# Patient Record
Sex: MALE | Race: BLACK OR AFRICAN AMERICAN | NOT HISPANIC OR LATINO | Employment: OTHER | URBAN - METROPOLITAN AREA
[De-identification: names, ages, dates, MRNs, and addresses within clinical notes are randomized per-mention and may not be internally consistent; named-entity substitution may affect disease eponyms.]

---

## 2017-01-12 ENCOUNTER — OFFICE VISIT (OUTPATIENT)
Dept: WOUND CARE | Facility: CLINIC | Age: 62
End: 2017-01-12
Payer: MEDICARE

## 2017-01-12 VITALS
BODY MASS INDEX: 25.35 KG/M2 | HEIGHT: 72 IN | HEART RATE: 80 BPM | SYSTOLIC BLOOD PRESSURE: 131 MMHG | TEMPERATURE: 98 F | DIASTOLIC BLOOD PRESSURE: 72 MMHG | WEIGHT: 187.19 LBS

## 2017-01-12 DIAGNOSIS — L89.320 DECUBITUS ULCER OF LEFT ISCHIUM, UNSTAGEABLE: ICD-10-CM

## 2017-01-12 DIAGNOSIS — G82.20 PARAPLEGIA: ICD-10-CM

## 2017-01-12 DIAGNOSIS — L89.312: Primary | ICD-10-CM

## 2017-01-12 DIAGNOSIS — L89.512 DECUBITUS ULCER OF RIGHT ANKLE, STAGE 2: ICD-10-CM

## 2017-01-12 PROCEDURE — 99212 OFFICE O/P EST SF 10 MIN: CPT | Mod: S$PBB,,, | Performed by: NURSE PRACTITIONER

## 2017-01-12 PROCEDURE — 99999 PR PBB SHADOW E&M-EST. PATIENT-LVL V: CPT | Mod: PBBFAC,,, | Performed by: NURSE PRACTITIONER

## 2017-01-12 PROCEDURE — 99215 OFFICE O/P EST HI 40 MIN: CPT | Mod: PBBFAC | Performed by: NURSE PRACTITIONER

## 2017-01-12 NOTE — MR AVS SNAPSHOT
[facility]  [address]  [facility] [address]  Phone: [phone]                  Maciel Contreras   2017 2:00 PM   Office Visit    Description:  Male : 1955   Provider:  Mirna Guzman NP   Department:  [facility]           Reason for Visit     Wound Check           Diagnoses this Visit        Comments    Decubitus ulcer of right ischium, stage 2    -  Primary     Decubitus ulcer of left ischium, unstageable         Decubitus ulcer of right ankle, stage 2         Paraplegia                To Do List           Future Appointments        Provider Department Dept Phone    2017 2:00 PM KAVEH Velasquez [facility] [phone]      Goals (5 Years of Data)     None      Follow-Up and Disposition     Return in about 1 month (around 2017) for Wound evaluation.      [facility] On Call     [facility] On Call Nurse [facility] Line -  Assistance  Registered nurses in the [facility] On Call Center provide clinical advisement, health education, appointment booking, and other advisory services.  Call for this free service at [phone].             Medications           Message regarding Medications     Verify the changes and/or additions to your medication regime listed below are the same as discussed with your clinician today.  If any of these changes or additions are incorrect, please notify your healthcare provider.             Verify that the below list of medications is an accurate representation of the medications you are currently taking.  If none reported, the list may be blank. If incorrect, please contact your healthcare provider. Carry this list with you in case of emergency.           Current Medications     atenolol-chlorthalidone (TENORETIC) 100-25 mg per tablet Take 1 tablet by mouth once daily.    carisoprodol (SOMA) 350 MG tablet Take 1 tablet (350 mg total) by mouth 2 (two) times daily as needed.    ciprofloxacin HCl (CIPRO) 500 MG tablet Take 1  tablet (500 mg total) by mouth 2 (two) times daily.    clobetasol (TEMOVATE) 0.05 % cream Apply topically 2 (two) times daily.    cyclobenzaprine (FLEXERIL) 10 MG tablet Take 1 tablet by mouth 3 (three) times daily.    docusate sodium (COLACE) 100 MG capsule Take 1 capsule (100 mg total) by mouth 2 (two) times daily as needed for Constipation.    furosemide (LASIX) 40 MG tablet Take 1 tablet (40 mg total) by mouth daily as needed. For leg swelling.    lactulose (CHRONULAC) 20 gram/30 mL Soln 1-2 tablespoons daily for constipation    lorazepam (ATIVAN) 2 MG Tab TAKE ONE TABLET BY MOUTH TWICE DAILY    mometasone (NASONEX) 50 mcg/actuation nasal spray 1 spray by Nasal route once daily.    oxycodone-acetaminophen (PERCOCET)  mg per tablet Take 1 tablet by mouth 3 (three) times daily as needed.    potassium chloride SA (K-DUR,KLOR-CON) 20 MEQ tablet Take 1 tablet (20 mEq total) by mouth once daily.    promethazine-codeine 6.25-10 mg/5 ml (PHENERGAN WITH CODEINE) 6.25-10 mg/5 mL syrup Take 5-10 mLs by mouth every 4 (four) hours as needed for Cough.    tramadol (ULTRAM) 50 mg tablet TAKE 1 TO 2 TABLETS BY MOUTH THREE TIMES DAILY AS NEEDED FOR PAIN    vardenafil (LEVITRA) 20 MG tablet Take 1 tablet (20 mg total) by mouth daily as needed for Erectile Dysfunction.    zolpidem (AMBIEN) 10 mg Tab Take 1 tablet (10 mg total) by mouth nightly as needed.           Clinical Reference Information           Vital Signs - Last Recorded  Most recent update: 1/12/2017  2:25 PM by Anayeli Adams LPN    BP Pulse Temp Ht Wt BMI    131/72 80 98 °F (36.7 °C) (Oral) 6' (1.829 m) 84.9 kg (187 lb 3.2 oz) 25.39 kg/m2      Blood Pressure          Most Recent Value    BP  131/72      Allergies as of 1/12/2017     Cephalexin      Immunizations Administered on Date of Encounter - 1/12/2017     None      Instructions    Keep your dressing dry.  On the day home health is coming to change your dressing, you may shower with a mild soap such as  Dove.  Irrigate the wound with lukewarm water for 5 minutes, then dry thoroughly.  Place a dry bandage over the wound to cover it until the nurse arrives.       Smoking Cessation     If you would like to quit smoking:   You may be eligible for free services if you are a [place] resident and started smoking cigarettes before September 1, 1988.  Call the Smoking Cessation Trust ([facility]) toll free at [phone] [phone] or [phone].   Call 7-800-QUIT-NOW if you do not meet the above criteria.

## 2017-01-12 NOTE — PATIENT INSTRUCTIONS
Keep your dressing dry.  On the day home health is coming to change your dressing, you may shower with a mild soap such as Dove.  Irrigate the wound with lukewarm water for 5 minutes, then dry thoroughly.  Place a dry bandage over the wound to cover it until the nurse arrives.

## 2017-01-12 NOTE — PROGRESS NOTES
Subjective:       Patient ID: Maciel Contreras is a 61 y.o. male.    Chief Complaint: Wound Check    Wound Check       This patient is seen today for reevaluation of recurrent ulcers to the right lateral ankle and both hips.  His medical history is significant for paraplegia.  These wounds all recurred within the last month.  He was not seen last month because of transportation issues.  He is using mepilex foam border dressings on the wounds.  He is afebrile.  He denies increased redness, swelling or purulent drainage.  He has no pain.    Review of Systems  Unchanged from prior visit.  Objective:      Physical Exam   Constitutional: He is oriented to person, place, and time. He appears well-developed and well-nourished. No distress.   HENT:   Head: Normocephalic and atraumatic.   Musculoskeletal: He exhibits no edema or tenderness.        Legs:       Feet:    Patient is paraplegic and unable to voluntarily move lower extremities.   Neurological: He is alert and oriented to person, place, and time.   Skin: Skin is warm and dry. No rash noted. He is not diaphoretic. No cyanosis or erythema. Nails show no clubbing.   Psychiatric: He has a normal mood and affect. His behavior is normal. Judgment and thought content normal.   Nursing note and vitals reviewed.        Assessment:       1. Decubitus ulcer of right ischium, stage 2    2. Decubitus ulcer of left ischium, unstageable    3. Decubitus ulcer of right ankle, stage 2    4. Paraplegia        Plan:           Cleanse wounds with wound .  Skin prep to periwound area.  Santyl to left ischial decubitus.  Aquacel or allevyn foam border dressing to bilateral ischial (4x4) and right lateral ankle (3x3) wounds three times weekly.  Secure ankle dressing with roll gauze.  [facility] to assist with dressing changes three times weekly.  Return to clinic in 2 weeks.      Right hip      Left ischium      Right lateral  ankle

## 2017-02-07 ENCOUNTER — TELEPHONE (OUTPATIENT)
Dept: WOUND CARE | Facility: CLINIC | Age: 62
End: 2017-02-07

## 2017-02-08 ENCOUNTER — TELEPHONE (OUTPATIENT)
Dept: WOUND CARE | Facility: CLINIC | Age: 62
End: 2017-02-08

## 2017-02-09 ENCOUNTER — APPOINTMENT (OUTPATIENT)
Dept: LAB | Facility: HOSPITAL | Age: 62
End: 2017-02-09
Attending: INTERNAL MEDICINE
Payer: MEDICARE

## 2017-02-09 PROCEDURE — 87088 URINE BACTERIA CULTURE: CPT

## 2017-02-09 PROCEDURE — 87186 SC STD MICRODIL/AGAR DIL: CPT

## 2017-02-09 PROCEDURE — 87086 URINE CULTURE/COLONY COUNT: CPT

## 2017-02-09 PROCEDURE — 87077 CULTURE AEROBIC IDENTIFY: CPT

## 2017-02-14 ENCOUNTER — OFFICE VISIT (OUTPATIENT)
Dept: WOUND CARE | Facility: CLINIC | Age: 62
End: 2017-02-14
Payer: MEDICARE

## 2017-02-14 VITALS
DIASTOLIC BLOOD PRESSURE: 82 MMHG | HEIGHT: 72 IN | BODY MASS INDEX: 26.02 KG/M2 | TEMPERATURE: 99 F | SYSTOLIC BLOOD PRESSURE: 153 MMHG | WEIGHT: 192.13 LBS | HEART RATE: 77 BPM

## 2017-02-14 DIAGNOSIS — L89.312: ICD-10-CM

## 2017-02-14 DIAGNOSIS — L89.512 DECUBITUS ULCER OF RIGHT ANKLE, STAGE 2: ICD-10-CM

## 2017-02-14 DIAGNOSIS — L89.320 DECUBITUS ULCER OF LEFT ISCHIUM, UNSTAGEABLE: Primary | ICD-10-CM

## 2017-02-14 DIAGNOSIS — G82.20 PARAPLEGIA: ICD-10-CM

## 2017-02-14 PROCEDURE — 99999 PR PBB SHADOW E&M-EST. PATIENT-LVL V: CPT | Mod: PBBFAC,,, | Performed by: NURSE PRACTITIONER

## 2017-02-14 PROCEDURE — 99215 OFFICE O/P EST HI 40 MIN: CPT | Mod: PBBFAC | Performed by: NURSE PRACTITIONER

## 2017-02-14 PROCEDURE — 99212 OFFICE O/P EST SF 10 MIN: CPT | Mod: S$PBB,,, | Performed by: NURSE PRACTITIONER

## 2017-02-14 NOTE — MR AVS SNAPSHOT
[facility]  [address]  [facility] [address]  Phone: [phone]                  Maciel Contreras   2017 1:20 PM   Office Visit    Description:  Male : 1955   Provider:  Mirna Guzman NP   Department:  [facility]           Reason for Visit     Wound Check           Diagnoses this Visit        Comments    Decubitus ulcer of left ischium, unstageable    -  Primary     Decubitus ulcer of right ischium, stage 2         Decubitus ulcer of right ankle, stage 2         Paraplegia                To Do List           Goals (5 Years of Data)     None      Follow-Up and Disposition     Return in about 1 month (around 3/14/2017) for Wound evaluation.      [facility]     [facility] On Call Nurse Care Line -  Assistance  Registered nurses in the [facility] On Call Center provide clinical advisement, health education, appointment booking, and other advisory services.  Call for this free service at [phone].             Medications           Message regarding Medications     Verify the changes and/or additions to your medication regime listed below are the same as discussed with your clinician today.  If any of these changes or additions are incorrect, please notify your healthcare provider.             Verify that the below list of medications is an accurate representation of the medications you are currently taking.  If none reported, the list may be blank. If incorrect, please contact your healthcare provider. Carry this list with you in case of emergency.           Current Medications     amoxicillin-clavulanate 875-125mg (AUGMENTIN) 875-125 mg per tablet Take 1 tablet by mouth 2 (two) times daily.    atenolol-chlorthalidone (TENORETIC) 100-25 mg per tablet Take 1 tablet by mouth once daily.    carisoprodol (SOMA) 350 MG tablet Take 1 tablet (350 mg total) by mouth 3 (three) times daily as needed.    ciprofloxacin HCl (CIPRO) 500 MG tablet Take 1 tablet (500 mg  total) by mouth 2 (two) times daily.    clobetasol (TEMOVATE) 0.05 % cream Apply topically 2 (two) times daily.    cyclobenzaprine (FLEXERIL) 10 MG tablet Take 1 tablet by mouth 3 (three) times daily.    docusate sodium (COLACE) 100 MG capsule Take 1 capsule (100 mg total) by mouth 2 (two) times daily as needed for Constipation.    furosemide (LASIX) 40 MG tablet Take 1 tablet (40 mg total) by mouth daily as needed. For leg swelling.    lactulose (CHRONULAC) 20 gram/30 mL Soln 1-2 tablespoons daily for constipation    lorazepam (ATIVAN) 2 MG Tab TAKE 1 TABLET BY MOUTH TWICE A DAY    meloxicam (MOBIC) 15 MG tablet Take 1 tablet (15 mg total) by mouth daily as needed for Pain.    methylPREDNISolone (MEDROL DOSEPACK) 4 mg tablet TAKE 6 TABLETS ON DAY 1 AS DIRECTED ON PACKAGE AND DECREASE BY 1 TAB EACH DAY FOR A TOTAL OF 6 DAYS    mometasone (NASONEX) 50 mcg/actuation nasal spray 1 spray by Nasal route once daily.    oxycodone-acetaminophen (PERCOCET)  mg per tablet Take 1 tablet by mouth 3 (three) times daily as needed.    potassium chloride SA (K-DUR,KLOR-CON) 20 MEQ tablet Take 1 tablet (20 mEq total) by mouth once daily.    promethazine-codeine 6.25-10 mg/5 ml (PHENERGAN WITH CODEINE) 6.25-10 mg/5 mL syrup Take 5-10 mLs by mouth every 4 (four) hours as needed for Cough.    temazepam (RESTORIL) 30 mg capsule Take 1 capsule (30 mg total) by mouth nightly as needed for Insomnia.    tramadol (ULTRAM) 50 mg tablet TAKE 1 TO 2 TABLETS BY MOUTH THREE TIMES DAILY AS NEEDED FOR PAIN    vardenafil (LEVITRA) 20 MG tablet Take 1 tablet (20 mg total) by mouth daily as needed for Erectile Dysfunction.    zolpidem (AMBIEN) 10 mg Tab TAKE 1 TABLET (10 MG TOTAL) BY MOUTH NIGHTLY AS NEEDED.           Clinical Reference Information           Your Vitals Were     Height Weight BMI          6' (1.829 m) 87.1 kg (192 lb 1.6 oz) 26.05 kg/m2        Allergies as of 2/14/2017     Cephalexin      Immunizations Administered on Date of  Encounter - 2/14/2017     None      Instructions    Keep your dressing dry.  On the day home health is coming to change your dressing, you may shower with a mild soap such as Dove.  Irrigate the wound with lukewarm water for 5 minutes, then dry thoroughly.  Place a dry bandage over the wound to cover it until the nurse arrives.       Smoking Cessation     If you would like to quit smoking:   You may be eligible for free services if you are a [place] resident and started smoking cigarettes before September 1, 1988.  Call the Smoking Cessation Trust ([facility]) toll free at [phone] or [phone].   Call 6-160-QUIT-NOW if you do not meet the above criteria.            Language Assistance Services     ATTENTION: Language assistance services are available, free of charge. Please call [phone].      ATENCIÓN: Si habla español, tiene a nunez disposición servicios gratuitos de asistencia lingüística. Llame al [phone].     CHÚ Ý: N?u b?n nói Ti?ng Vi?t, có các d?ch v? h? tr? ngôn ng? mi?n phí dành cho b?n. G?i s? [phone].         [facility] complies with applicable Federal civil rights laws and does not discriminate on the basis of race, color, national origin, age, disability, or sex.

## 2017-02-14 NOTE — PROGRESS NOTES
Subjective:       Patient ID: Maciel Contreras is a 61 y.o. male.    Chief Complaint: Wound Check    Wound Check       This patient is seen today for reevaluation of recurrent ulcers to the right lateral ankle and both hips.  His medical history is significant for paraplegia.  These wounds all recurred within the last month.  He was not seen last month because of transportation issues.  He is using mepilex foam border dressings on the wounds.  He is afebrile.  He denies increased redness, swelling or purulent drainage.  He has no pain.    Review of Systems  Unchanged from prior visit.  Objective:      Physical Exam   Constitutional: He is oriented to person, place, and time. He appears well-developed and well-nourished. No distress.   HENT:   Head: Normocephalic and atraumatic.   Musculoskeletal: He exhibits no edema or tenderness.        Legs:       Feet:    Patient is paraplegic and unable to voluntarily move lower extremities.   Neurological: He is alert and oriented to person, place, and time.   Skin: Skin is warm and dry. No rash noted. He is not diaphoretic. No cyanosis or erythema. Nails show no clubbing.   Psychiatric: He has a normal mood and affect. His behavior is normal. Judgment and thought content normal.   Nursing note and vitals reviewed.        Assessment:       1. Decubitus ulcer of left ischium, unstageable    2. Decubitus ulcer of right ischium, stage 2    3. Decubitus ulcer of right ankle, stage 2    4. Paraplegia        Plan:           Cleanse wounds with wound .  Skin prep to periwound area.  Aquacel or allevyn foam border dressing to bilateral ischial (4x4) and right lateral ankle (3x3) wounds three times weekly.  Secure ankle dressing with roll gauze.  [facility] to assist with dressing changes three times weekly.  Return to clinic in 2 weeks.      Right hip      Left ischium      Right lateral ankle

## 2017-03-30 ENCOUNTER — TELEPHONE (OUTPATIENT)
Dept: WOUND CARE | Facility: CLINIC | Age: 62
End: 2017-03-30

## 2017-03-30 NOTE — TELEPHONE ENCOUNTER
----- Message from Tomeka Real sent at 3/30/2017  3:57 PM CDT -----  Contact: Yesi/home health  Caller states that wound on right lateral ankle has some necrosis. She states she wanted to let you know before his apt tomorrow. Please call Yesi back @ [phone].Thank you.

## 2017-04-18 ENCOUNTER — OFFICE VISIT (OUTPATIENT)
Dept: WOUND CARE | Facility: CLINIC | Age: 62
End: 2017-04-18
Payer: MEDICARE

## 2017-04-18 VITALS
BODY MASS INDEX: 26.01 KG/M2 | HEART RATE: 76 BPM | WEIGHT: 192 LBS | HEIGHT: 72 IN | TEMPERATURE: 98 F | SYSTOLIC BLOOD PRESSURE: 133 MMHG | DIASTOLIC BLOOD PRESSURE: 82 MMHG

## 2017-04-18 DIAGNOSIS — G82.20 PARAPLEGIA: ICD-10-CM

## 2017-04-18 DIAGNOSIS — L89.512 DECUBITUS ULCER OF RIGHT ANKLE, STAGE 2: Primary | ICD-10-CM

## 2017-04-18 PROBLEM — L89.320: Status: RESOLVED | Noted: 2017-01-12 | Resolved: 2017-04-18

## 2017-04-18 PROCEDURE — 99999 PR PBB SHADOW E&M-EST. PATIENT-LVL V: CPT | Mod: PBBFAC,,, | Performed by: NURSE PRACTITIONER

## 2017-04-18 PROCEDURE — 99212 OFFICE O/P EST SF 10 MIN: CPT | Mod: S$PBB,,, | Performed by: NURSE PRACTITIONER

## 2017-04-18 PROCEDURE — 99215 OFFICE O/P EST HI 40 MIN: CPT | Mod: PBBFAC | Performed by: NURSE PRACTITIONER

## 2017-04-18 NOTE — PROGRESS NOTES
"Subjective:       Patient ID: Maciel Contreras is a 61 y.o. male.    Chief Complaint: Wound Check    Wound Check       This patient is seen today for reevaluation of recurrent ulcers to the right lateral ankle and both hips.  His medical history is significant for paraplegia.  These wounds all recurred in January 2017.  He was not seen last month because of transportation issues.  He is using mepilex foam border dressings on the wounds.  The ischial and hip wounds are healed.  The ankle ulcer is much larger in size.  He reports sleeping on his right side at night.  He is afebrile.  He denies increased redness, swelling or purulent drainage.  He has no pain.    Review of Systems  Unchanged from prior visit.  Objective:      Physical Exam   Constitutional: He is oriented to person, place, and time. He appears well-developed and well-nourished. No distress.   HENT:   Head: Normocephalic and atraumatic.   Musculoskeletal: He exhibits no edema or tenderness.        Legs:       Feet:    Patient is paraplegic and unable to voluntarily move lower extremities.   Neurological: He is alert and oriented to person, place, and time.   Skin: Skin is warm and dry. No rash noted. He is not diaphoretic. No cyanosis or erythema. Nails show no clubbing.   Psychiatric: He has a normal mood and affect. His behavior is normal. Judgment and thought content normal.   Nursing note and vitals reviewed.        Assessment:       1. Decubitus ulcer of right ankle, stage 2    2. Paraplegia        Plan:           Keep pressure off of right ankle and sleep only on left side and back.  Cleanse wound with wound .  Calcium alginate to right lateral ankle ulcer three times weekly.  Roll gauze to right lower leg.  Apply two 4" ace wraps to right lower leg for compression.  [facility] to assist with dressing changes three times weekly.  Return to clinic in one month.      Bilateral hips          Right lateral " ankle

## 2017-04-18 NOTE — MR AVS SNAPSHOT
[facility] - [facility] Care  [address]  [facility] [address]  Phone: [phone]                  Maciel Contreras   2017 10:20 AM   Office Visit    Description:  Male : 1955   Provider:  Mirna Guzman NP   Department:  [facility]           Reason for Visit     Wound Check           Diagnoses this Visit        Comments    Decubitus ulcer of left ischium, unstageable    -  Primary     Decubitus ulcer of right ankle, stage 2         Decubitus ulcer of right ischium, stage 2         Paraplegia                To Do List           Future Appointments        Provider Department Dept Phone    2017 2:20 PM KAVEH Velasquez [facility] - Wound Care [phone]      Goals (5 Years of Data)     None      [facility] On Call     [facility] On Call Nurse Care Line -  Assistance  Unless otherwise directed by your provider, please contact [facility], our nurse care line that is available for  assistance.     Registered nurses in the [facility] On Call Center provide: appointment scheduling, clinical advisement, health education, and other advisory services.  Call: [phone] (toll free)               Medications           Message regarding Medications     Verify the changes and/or additions to your medication regime listed below are the same as discussed with your clinician today.  If any of these changes or additions are incorrect, please notify your healthcare provider.             Verify that the below list of medications is an accurate representation of the medications you are currently taking.  If none reported, the list may be blank. If incorrect, please contact your healthcare provider. Carry this list with you in case of emergency.           Current Medications     amoxicillin-clavulanate 875-125mg (AUGMENTIN) 875-125 mg per tablet Take 1 tablet by mouth 2 (two) times daily.    atenolol-chlorthalidone (TENORETIC) 100-25 mg per tablet Take 1 tablet by mouth once daily.     carisoprodol (SOMA) 350 MG tablet Take 1 tablet (350 mg total) by mouth 3 (three) times daily as needed.    ciprofloxacin HCl (CIPRO) 500 MG tablet Take 1 tablet (500 mg total) by mouth 2 (two) times daily.    cyclobenzaprine (FLEXERIL) 10 MG tablet Take 1 tablet by mouth 3 (three) times daily.    docusate sodium (COLACE) 100 MG capsule Take 1 capsule (100 mg total) by mouth 2 (two) times daily as needed for Constipation.    furosemide (LASIX) 40 MG tablet Take 1 tablet (40 mg total) by mouth daily as needed. For leg swelling.    lactulose (CHRONULAC) 20 gram/30 mL Soln 1-2 tablespoons daily for constipation    lorazepam (ATIVAN) 2 MG Tab Take 1 tablet (2 mg total) by mouth 2 (two) times daily.    meloxicam (MOBIC) 15 MG tablet TAKE 1 TABLET (15 MG TOTAL) BY MOUTH DAILY AS NEEDED FOR PAIN.    methylPREDNISolone (MEDROL DOSEPACK) 4 mg tablet TAKE 6 TABLETS ON DAY 1 AS DIRECTED ON PACKAGE AND DECREASE BY 1 TAB EACH DAY FOR A TOTAL OF 6 DAYS    mometasone (NASONEX) 50 mcg/actuation nasal spray 1 spray by Nasal route once daily.    oxycodone-acetaminophen (PERCOCET)  mg per tablet Take 1 tablet by mouth 3 (three) times daily as needed.    potassium chloride SA (K-DUR,KLOR-CON) 20 MEQ tablet Take 1 tablet (20 mEq total) by mouth once daily.    promethazine-codeine 6.25-10 mg/5 ml (PHENERGAN WITH CODEINE) 6.25-10 mg/5 mL syrup Take 5-10 mLs by mouth every 4 (four) hours as needed for Cough.    tramadol (ULTRAM) 50 mg tablet TAKE 1 TO 2 TABLETS BY MOUTH THREE TIMES DAILY AS NEEDED FOR PAIN    zolpidem (AMBIEN) 10 mg Tab TAKE 1 TABLET (10 MG TOTAL) BY MOUTH NIGHTLY AS NEEDED.    clobetasol (TEMOVATE) 0.05 % cream Apply topically 2 (two) times daily.    vardenafil (LEVITRA) 20 MG tablet Take 1 tablet (20 mg total) by mouth daily as needed for Erectile Dysfunction.           Clinical Reference Information           Your Vitals Were     BP Pulse Temp Height Weight BMI    133/82 76 98 °F (36.7 °C) (Oral) 6' (1.829 m) 87.1  kg (192 lb) 26.04 kg/m2      Blood Pressure          Most Recent Value    BP  133/82      Allergies as of 4/18/2017     Cephalexin      Immunizations Administered on Date of Encounter - 4/18/2017     None      Smoking Cessation     If you would like to quit smoking:   You may be eligible for free services if you are a [place] resident and started smoking cigarettes before September 1, 1988.  Call the Smoking Cessation Trust ([facility]) toll free at [phone] or [phone].   Call 1-800-QUIT-NOW if you do not meet the above criteria.   Contact us via email: [email or web address]   View our website for more information: [email or web address]        Language Assistance Services     ATTENTION: Language assistance services are available, free of charge. Please call [phone].      ATENCIÓN: Si habla español, tiene a nunez disposición servicios gratuitos de asistencia lingüística. Llame al [phone].     CHÚ Ý: N?u b?n nói Ti?ng Vi?t, có các d?ch v? h? tr? ngôn ng? mi?n phí dành cho b?n. G?i s? [phone].         [facility] complies with applicable Federal civil rights laws and does not discriminate on the basis of race, color, national origin, age, disability, or sex.

## 2017-05-18 ENCOUNTER — TELEPHONE (OUTPATIENT)
Dept: WOUND CARE | Facility: CLINIC | Age: 62
End: 2017-05-18

## 2017-06-01 ENCOUNTER — OFFICE VISIT (OUTPATIENT)
Dept: WOUND CARE | Facility: CLINIC | Age: 62
End: 2017-06-01
Payer: MEDICARE

## 2017-06-01 VITALS
DIASTOLIC BLOOD PRESSURE: 77 MMHG | HEART RATE: 73 BPM | BODY MASS INDEX: 25.09 KG/M2 | SYSTOLIC BLOOD PRESSURE: 130 MMHG | WEIGHT: 189.31 LBS | TEMPERATURE: 98 F | HEIGHT: 73 IN

## 2017-06-01 DIAGNOSIS — L89.512 DECUBITUS ULCER OF RIGHT ANKLE, STAGE 2: Primary | ICD-10-CM

## 2017-06-01 DIAGNOSIS — G82.20 PARAPLEGIA: ICD-10-CM

## 2017-06-01 PROCEDURE — 99999 PR PBB SHADOW E&M-EST. PATIENT-LVL V: CPT | Mod: PBBFAC,,, | Performed by: NURSE PRACTITIONER

## 2017-06-01 PROCEDURE — 99212 OFFICE O/P EST SF 10 MIN: CPT | Mod: S$PBB,,, | Performed by: NURSE PRACTITIONER

## 2017-06-01 PROCEDURE — 99215 OFFICE O/P EST HI 40 MIN: CPT | Mod: PBBFAC | Performed by: NURSE PRACTITIONER

## 2017-06-01 RX ORDER — TEMAZEPAM 30 MG/1
CAPSULE ORAL
Refills: 1 | COMMUNITY
Start: 2017-05-09 | End: 2017-08-10 | Stop reason: SDUPTHER

## 2017-06-01 NOTE — PROGRESS NOTES
"Subjective:       Patient ID: Maciel Contrears is a 61 y.o. male.    Chief Complaint: Wound Check    Wound Check       This patient is seen today for reevaluation of a recurrent ulcer to the right lateral ankle.  His medical history is significant for paraplegia.  This wound recurred in January 2017.  He is using hydrofiber dressing on the ankle wound and it is healing as evidenced by wound contracture.  However he has two new wound to the right posterior calf.  The skin to the right lower leg is very dry and flaky.  He is afebrile.  He denies increased redness, swelling or purulent drainage.  He has no pain.    Review of Systems  Unchanged from prior visit.  Objective:      Physical Exam   Constitutional: He is oriented to person, place, and time. He appears well-developed and well-nourished. No distress.   HENT:   Head: Normocephalic and atraumatic.   Musculoskeletal: He exhibits no edema or tenderness.        Legs:       Feet:    Patient is paraplegic and unable to voluntarily move lower extremities.   Neurological: He is alert and oriented to person, place, and time.   Skin: Skin is warm and dry. No rash noted. He is not diaphoretic. No cyanosis or erythema. Nails show no clubbing.   Psychiatric: He has a normal mood and affect. His behavior is normal. Judgment and thought content normal.   Nursing note and vitals reviewed.        Assessment:       1. Decubitus ulcer of right ankle, stage 2    2. Paraplegia        Plan:           Keep pressure off of right ankle and sleep only on left side and back.  Cleanse wounds with wound .  Moisturizer to right leg and foot.  Calcium alginate to right lateral ankle and calf ulcers three times weekly.  Roll gauze to right lower leg.  Apply two 4" ace wraps to right lower leg for compression.  [facility] to assist with dressing changes three times weekly.  Return to clinic in one month.      Right lateral ankle    Right " calf

## 2017-08-16 ENCOUNTER — TELEPHONE (OUTPATIENT)
Dept: WOUND CARE | Facility: CLINIC | Age: 62
End: 2017-08-16

## 2017-08-18 ENCOUNTER — TELEPHONE (OUTPATIENT)
Dept: WOUND CARE | Facility: CLINIC | Age: 62
End: 2017-08-18

## 2017-08-18 NOTE — TELEPHONE ENCOUNTER
----- Message from Asher Andres sent at 8/18/2017  2:32 PM CDT -----  Contact: Issa//[facility]  Caller states that (s)he needs to speak with nurse in ref to getting the pt re-certified for home health services//please call back at [phone]//thank you

## 2017-08-24 ENCOUNTER — OFFICE VISIT (OUTPATIENT)
Dept: WOUND CARE | Facility: CLINIC | Age: 62
End: 2017-08-24
Payer: MEDICARE

## 2017-08-24 VITALS
HEIGHT: 73 IN | HEART RATE: 64 BPM | SYSTOLIC BLOOD PRESSURE: 142 MMHG | TEMPERATURE: 97 F | BODY MASS INDEX: 25.05 KG/M2 | DIASTOLIC BLOOD PRESSURE: 81 MMHG | WEIGHT: 189 LBS

## 2017-08-24 DIAGNOSIS — L97.911 ULCER OF RIGHT LOWER EXTREMITY, LIMITED TO BREAKDOWN OF SKIN: ICD-10-CM

## 2017-08-24 DIAGNOSIS — G82.20 PARAPLEGIA: ICD-10-CM

## 2017-08-24 DIAGNOSIS — L30.9 DERMATITIS: ICD-10-CM

## 2017-08-24 DIAGNOSIS — L89.512 DECUBITUS ULCER OF RIGHT ANKLE, STAGE 2: Primary | ICD-10-CM

## 2017-08-24 DIAGNOSIS — B35.3 TINEA PEDIS OF BOTH FEET: ICD-10-CM

## 2017-08-24 PROCEDURE — 99212 OFFICE O/P EST SF 10 MIN: CPT | Mod: S$PBB,,, | Performed by: NURSE PRACTITIONER

## 2017-08-24 PROCEDURE — 99999 PR PBB SHADOW E&M-EST. PATIENT-LVL V: CPT | Mod: PBBFAC,,, | Performed by: NURSE PRACTITIONER

## 2017-08-24 PROCEDURE — 3077F SYST BP >= 140 MM HG: CPT | Mod: ,,, | Performed by: NURSE PRACTITIONER

## 2017-08-24 PROCEDURE — 99215 OFFICE O/P EST HI 40 MIN: CPT | Mod: PBBFAC | Performed by: NURSE PRACTITIONER

## 2017-08-24 PROCEDURE — 3079F DIAST BP 80-89 MM HG: CPT | Mod: ,,, | Performed by: NURSE PRACTITIONER

## 2017-08-24 NOTE — PATIENT INSTRUCTIONS
Keep your dressing dry.  On the day home health is coming to change your dressing, you may shower with a mild soap such as Dove.  Irrigate the wound with lukewarm water for 5 minutes, then dry thoroughly.  Place a dry bandage over the wound to cover it until the nurse arrives.    Purchase over the counter knee high hose to use in place of ace wraps on your legs.  Apply hose first thing in the morning and remove at bedtime.  Do not sleep with wraps.

## 2017-08-24 NOTE — PROGRESS NOTES
"Subjective:       Patient ID: Maciel Contreras is a 62 y.o. male.    Chief Complaint: Wound Check    Wound Check       This patient is seen today for reevaluation of a recurrent ulcer to the right lateral ankle and an ulcer to the right calf.  He also has recurrent decubiti to the right ischium.  His medical history is significant for paraplegia.  He is using hydrofiber dressing on the ankle and leg wounds and they are now healed.  The right ischial wounds were just discovered today.  The skin to the right lower leg is very dry and flaky.  He is afebrile.  He denies increased redness, swelling or purulent drainage.  His pain level is 4/10..    Review of Systems  Unchanged from prior visit.  Objective:      Physical Exam   Constitutional: He is oriented to person, place, and time. He appears well-developed and well-nourished. No distress.   HENT:   Head: Normocephalic and atraumatic.   Musculoskeletal: He exhibits no edema or tenderness.        Legs:       Feet:    Patient is paraplegic and unable to voluntarily move lower extremities.   Neurological: He is alert and oriented to person, place, and time.   Skin: Skin is warm and dry. No rash noted. He is not diaphoretic. No cyanosis or erythema. Nails show no clubbing.   Psychiatric: He has a normal mood and affect. His behavior is normal. Judgment and thought content normal.   Nursing note and vitals reviewed.        Assessment:       1. Decubitus ulcer of right ankle, stage 2    2. Ulcer of right lower extremity, limited to breakdown of skin    3. Tinea pedis of both feet    4. Dermatitis    5. Paraplegia        Plan:           Keep pressure off of bony prominences at all times.  Cleanse wounds with wound .  Moisturizer to right leg and foot.  Roll gauze to right lower leg.  Apply two 4" ace wraps to bilateral lower legs for compression.  Medihoney gel to right ischial decubiti, cover with hydrofiber and gauze and secure with a mepore island dressing three " times weekly.  [facility] to assist with dressing changes three times weekly.  Return to clinic in one month.      Right lateral ankle      Right calf    Right ischial unstageable decubiti

## 2017-09-20 ENCOUNTER — OFFICE VISIT (OUTPATIENT)
Dept: WOUND CARE | Facility: CLINIC | Age: 62
End: 2017-09-20
Payer: MEDICARE

## 2017-09-20 VITALS
HEART RATE: 74 BPM | SYSTOLIC BLOOD PRESSURE: 140 MMHG | TEMPERATURE: 98 F | BODY MASS INDEX: 25.34 KG/M2 | HEIGHT: 71 IN | DIASTOLIC BLOOD PRESSURE: 91 MMHG | WEIGHT: 181 LBS

## 2017-09-20 DIAGNOSIS — L30.9 DERMATITIS: ICD-10-CM

## 2017-09-20 DIAGNOSIS — L97.911 ULCER OF RIGHT LOWER EXTREMITY, LIMITED TO BREAKDOWN OF SKIN: ICD-10-CM

## 2017-09-20 DIAGNOSIS — G82.20 PARAPLEGIA: ICD-10-CM

## 2017-09-20 DIAGNOSIS — B35.3 TINEA PEDIS OF BOTH FEET: ICD-10-CM

## 2017-09-20 DIAGNOSIS — L89.512 DECUBITUS ULCER OF RIGHT ANKLE, STAGE 2: Primary | ICD-10-CM

## 2017-09-20 PROCEDURE — 99212 OFFICE O/P EST SF 10 MIN: CPT | Mod: S$PBB,,, | Performed by: NURSE PRACTITIONER

## 2017-09-20 PROCEDURE — 3080F DIAST BP >= 90 MM HG: CPT | Mod: ,,, | Performed by: NURSE PRACTITIONER

## 2017-09-20 PROCEDURE — 3077F SYST BP >= 140 MM HG: CPT | Mod: ,,, | Performed by: NURSE PRACTITIONER

## 2017-09-20 PROCEDURE — 99215 OFFICE O/P EST HI 40 MIN: CPT | Mod: PBBFAC | Performed by: NURSE PRACTITIONER

## 2017-09-20 PROCEDURE — 99999 PR PBB SHADOW E&M-EST. PATIENT-LVL V: CPT | Mod: PBBFAC,,, | Performed by: NURSE PRACTITIONER

## 2017-09-20 NOTE — PATIENT INSTRUCTIONS
Keep pressure off of bony prominences.  Rotate position every 2 hours.  Keep your dressing dry.  On the day home health is coming to change your dressing, you may shower with a mild soap such as Dove.  Irrigate the wound with lukewarm water for 5 minutes, then dry thoroughly.  Place a dry bandage over the wound to cover it until the nurse arrives.

## 2017-09-20 NOTE — PROGRESS NOTES
Subjective:       Patient ID: Maciel Contreras is a 62 y.o. male.    Chief Complaint: Wound Check    Wound Check       This patient is seen today for reevaluation of a recurrent ulcer to the right lateral ankle and an ulcer to the right calf.  He also has recurrent decubiti to the right ischium.  His medical history is significant for paraplegia.  He is using hydrofiber dressing on the ankle and leg wounds and they are now healed.  The right ischial wounds were just discovered today.  The skin to the right lower leg is very dry and flaky.  He is afebrile.  He denies increased redness, swelling or purulent drainage.  His pain level is 4/10..    Review of Systems  Unchanged from prior visit.  Objective:      Physical Exam   Constitutional: He is oriented to person, place, and time. He appears well-developed and well-nourished. No distress.   HENT:   Head: Normocephalic and atraumatic.   Musculoskeletal: He exhibits no edema or tenderness.        Legs:       Feet:    Patient is paraplegic and unable to voluntarily move lower extremities.   Neurological: He is alert and oriented to person, place, and time.   Skin: Skin is warm and dry. No rash noted. He is not diaphoretic. No cyanosis or erythema. Nails show no clubbing.   Psychiatric: He has a normal mood and affect. His behavior is normal. Judgment and thought content normal.   Nursing note and vitals reviewed.        Assessment:       1. Decubitus ulcer of right ankle, stage 2    2. Ulcer of right lower extremity, limited to breakdown of skin    3. Tinea pedis of both feet    4. Dermatitis    5. Paraplegia        Plan:           Keep pressure off of bony prominences at all times.  Cleanse wound with wound .  Moisturizer to right leg and foot.  Skin prep to periwound area right ischial ulcer.  Mepilex foam border dressing right ischial decubitus three times weekly.  [facility] to assist with dressing changes three times weekly.  Return to clinic in one  month.      Right lateral ankle      Right ischial stage II decubitus    Left ischium

## 2017-10-19 ENCOUNTER — OFFICE VISIT (OUTPATIENT)
Dept: WOUND CARE | Facility: CLINIC | Age: 62
End: 2017-10-19
Payer: MEDICARE

## 2017-10-19 VITALS
HEIGHT: 71 IN | DIASTOLIC BLOOD PRESSURE: 88 MMHG | SYSTOLIC BLOOD PRESSURE: 149 MMHG | BODY MASS INDEX: 26.74 KG/M2 | HEART RATE: 86 BPM | WEIGHT: 191 LBS | TEMPERATURE: 98 F

## 2017-10-19 DIAGNOSIS — G82.20 PARAPLEGIA: ICD-10-CM

## 2017-10-19 DIAGNOSIS — R60.0 EDEMA OF LEG: ICD-10-CM

## 2017-10-19 DIAGNOSIS — L97.911 ULCER OF RIGHT LOWER EXTREMITY, LIMITED TO BREAKDOWN OF SKIN: Primary | ICD-10-CM

## 2017-10-19 DIAGNOSIS — B35.3 TINEA PEDIS OF BOTH FEET: ICD-10-CM

## 2017-10-19 PROBLEM — L89.512: Status: RESOLVED | Noted: 2017-01-12 | Resolved: 2017-10-19

## 2017-10-19 PROCEDURE — 99215 OFFICE O/P EST HI 40 MIN: CPT | Mod: PBBFAC | Performed by: NURSE PRACTITIONER

## 2017-10-19 PROCEDURE — 99212 OFFICE O/P EST SF 10 MIN: CPT | Mod: S$PBB,,, | Performed by: NURSE PRACTITIONER

## 2017-10-19 PROCEDURE — 99999 PR PBB SHADOW E&M-EST. PATIENT-LVL V: CPT | Mod: PBBFAC,,, | Performed by: NURSE PRACTITIONER

## 2017-10-19 NOTE — PROGRESS NOTES
Subjective:       Patient ID: Maciel Contreras is a 62 y.o. male.    Chief Complaint: Wound Check    Wound Check       This patient is seen today for reevaluation of a recurrent ulcer to the right ischium.  His medical history is significant for paraplegia.  He is using mepilex foam dressing to the ischial wound and it is larger in size.  He is having problems with his left shoulder and has a torn rotator cuff.  Because of this he does not have as much strength in the left arm and is dragging his buttocks when he transfers.  He is afebrile.  He denies increased redness, swelling or purulent drainage.  His pain level is 6/10.    Review of Systems  Unchanged from prior visit.  Objective:      Physical Exam   Constitutional: He is oriented to person, place, and time. He appears well-developed and well-nourished. No distress.   HENT:   Head: Normocephalic and atraumatic.   Musculoskeletal: He exhibits no edema or tenderness.        Legs:  Patient is paraplegic and unable to voluntarily move lower extremities.   Neurological: He is alert and oriented to person, place, and time.   Skin: Skin is warm and dry. No rash noted. He is not diaphoretic. No cyanosis or erythema. Nails show no clubbing.   Psychiatric: He has a normal mood and affect. His behavior is normal. Judgment and thought content normal.   Nursing note and vitals reviewed.        Assessment:       1. Ulcer of right lower extremity, limited to breakdown of skin    2. Edema of leg    3. Paraplegia    4. Tinea pedis of both feet        Plan:           Keep pressure off of bony prominences at all times.  Cleanse wound with wound .  Moisturizer to right leg and foot.  Skin prep to periwound area right ischial ulcer.  Mepilex foam border dressing right ischial decubitus three times weekly.  [facility] to assist with dressing changes three times weekly.  Return to clinic in one month.    Right ischial stage II  decubitus

## 2017-10-19 NOTE — PATIENT INSTRUCTIONS
Be careful not to drag your bottom when transferring.  Use skin prep prior to applying the mepilex to help keep it in place.

## 2017-11-17 ENCOUNTER — OFFICE VISIT (OUTPATIENT)
Dept: WOUND CARE | Facility: CLINIC | Age: 62
End: 2017-11-17
Payer: MEDICARE

## 2017-11-17 VITALS
WEIGHT: 186 LBS | HEART RATE: 68 BPM | BODY MASS INDEX: 24.65 KG/M2 | SYSTOLIC BLOOD PRESSURE: 122 MMHG | HEIGHT: 73 IN | DIASTOLIC BLOOD PRESSURE: 72 MMHG | TEMPERATURE: 97 F

## 2017-11-17 DIAGNOSIS — G82.20 PARAPLEGIA: ICD-10-CM

## 2017-11-17 DIAGNOSIS — L89.312: Primary | ICD-10-CM

## 2017-11-17 PROBLEM — L97.911 ULCER OF RIGHT LOWER EXTREMITY, LIMITED TO BREAKDOWN OF SKIN: Status: RESOLVED | Noted: 2017-08-24 | Resolved: 2017-11-17

## 2017-11-17 PROCEDURE — 99212 OFFICE O/P EST SF 10 MIN: CPT | Mod: S$PBB,,, | Performed by: NURSE PRACTITIONER

## 2017-11-17 PROCEDURE — 99215 OFFICE O/P EST HI 40 MIN: CPT | Mod: PBBFAC | Performed by: NURSE PRACTITIONER

## 2017-11-17 PROCEDURE — 99999 PR PBB SHADOW E&M-EST. PATIENT-LVL V: CPT | Mod: PBBFAC,,, | Performed by: NURSE PRACTITIONER

## 2017-11-17 NOTE — PROGRESS NOTES
Subjective:       Patient ID: Maciel Contreras is a 62 y.o. male.    Chief Complaint: Wound Check    Wound Check       This patient is seen today for reevaluation of a recurrent ulcer to the right ischium.  His medical history is significant for paraplegia.  When transferring with a transfer board he dragged across his transfer board and has a new wound distal to the original wound.  He is using mepilex foam dressing to the wounds.  The original wound is the same size and he has the new wound as well.  Because he has weakness in his left shoulder and right wrist, it is more difficult for him to lift when transferring.  He is supposed to be having upcoming orthopedic surgery to address this.  He is afebrile.  He denies increased redness, swelling or purulent drainage.  He does not have any pain from the wounds.    Review of Systems  Unchanged from prior visit.  Objective:      Physical Exam   Constitutional: He is oriented to person, place, and time. He appears well-developed and well-nourished. No distress.   HENT:   Head: Normocephalic and atraumatic.   Musculoskeletal: He exhibits no edema or tenderness.        Legs:  Patient is paraplegic and unable to voluntarily move lower extremities.   Neurological: He is alert and oriented to person, place, and time.   Skin: Skin is warm and dry. No rash noted. He is not diaphoretic. No cyanosis or erythema. Nails show no clubbing.   Psychiatric: He has a normal mood and affect. His behavior is normal. Judgment and thought content normal.   Nursing note and vitals reviewed.        Assessment:       1. Decubitus ulcer of right ischium, stage 2    2. Paraplegia        Plan:           Keep pressure off of bony prominences at all times.  Cleanse wound with wound .  Moisturizer to right leg and foot.  Skin prep to periwound area right ischial ulcer.  Endoform to base of right ischial decubiti and cover with mepilex foam border dressing three times weekly.  [facility] to  assist with dressing changes three times weekly.  Return to clinic in one month.    Right ischial stage II decubitus

## 2017-12-15 ENCOUNTER — TELEPHONE (OUTPATIENT)
Dept: WOUND CARE | Facility: CLINIC | Age: 62
End: 2017-12-15

## 2017-12-22 ENCOUNTER — OFFICE VISIT (OUTPATIENT)
Dept: WOUND CARE | Facility: CLINIC | Age: 62
End: 2017-12-22
Payer: MEDICARE

## 2017-12-22 VITALS
BODY MASS INDEX: 23.46 KG/M2 | DIASTOLIC BLOOD PRESSURE: 73 MMHG | HEIGHT: 73 IN | HEART RATE: 66 BPM | TEMPERATURE: 98 F | SYSTOLIC BLOOD PRESSURE: 116 MMHG | WEIGHT: 177 LBS

## 2017-12-22 DIAGNOSIS — G82.20 PARAPLEGIA: ICD-10-CM

## 2017-12-22 DIAGNOSIS — L89.312: Primary | ICD-10-CM

## 2017-12-22 PROCEDURE — 99999 PR PBB SHADOW E&M-EST. PATIENT-LVL V: CPT | Mod: PBBFAC,,, | Performed by: NURSE PRACTITIONER

## 2017-12-22 PROCEDURE — 99212 OFFICE O/P EST SF 10 MIN: CPT | Mod: S$PBB,,, | Performed by: NURSE PRACTITIONER

## 2017-12-22 PROCEDURE — 99215 OFFICE O/P EST HI 40 MIN: CPT | Mod: PBBFAC | Performed by: NURSE PRACTITIONER

## 2017-12-22 NOTE — PROGRESS NOTES
Subjective:       Patient ID: Maciel Contreras is a 62 y.o. male.    Chief Complaint: Wound Check    Wound Check       This patient is seen today for reevaluation of a recurrent ulcer to the right ischium.  His medical history is significant for paraplegia.  When transferring with a transfer board he dragged across his transfer board and has a new wound distal to the original wound.  He is using mepilex foam dressing to the wounds.  One of the wounds have healed and the other wound is healing as evidenced by wound contracture.  Because he has weakness in his left shoulder and right wrist, it is more difficult for him to lift when transferring.  He is supposed to be having upcoming orthopedic surgery to address this.  He is afebrile.  He denies increased redness, swelling or purulent drainage.  He does not have any pain from the wounds.    Review of Systems  Unchanged from prior visit.  Objective:      Physical Exam   Constitutional: He is oriented to person, place, and time. He appears well-developed and well-nourished. No distress.   HENT:   Head: Normocephalic and atraumatic.   Musculoskeletal: He exhibits no edema or tenderness.        Legs:  Patient is paraplegic and unable to voluntarily move lower extremities.   Neurological: He is alert and oriented to person, place, and time.   Skin: Skin is warm and dry. No rash noted. He is not diaphoretic. No cyanosis or erythema. Nails show no clubbing.   Psychiatric: He has a normal mood and affect. His behavior is normal. Judgment and thought content normal.   Nursing note and vitals reviewed.        Assessment:       1. Decubitus ulcer of right ischium, stage 2    2. Paraplegia        Plan:           Keep pressure off of bony prominences at all times.  Cleanse wound with wound .  Moisturizer to right leg and foot.  Skin prep to periwound area right ischial ulcer.  Endoform to base of right ischial decubiti and cover with mepilex foam border dressing three times  weekly.  [facility] to assist with dressing changes three times weekly.  Return to clinic in one month.    Right ischial stage II decubitus

## 2018-01-23 ENCOUNTER — OFFICE VISIT (OUTPATIENT)
Dept: WOUND CARE | Facility: CLINIC | Age: 63
End: 2018-01-23
Payer: MEDICARE

## 2018-01-23 VITALS
WEIGHT: 185 LBS | BODY MASS INDEX: 24.52 KG/M2 | SYSTOLIC BLOOD PRESSURE: 135 MMHG | DIASTOLIC BLOOD PRESSURE: 76 MMHG | HEIGHT: 73 IN | HEART RATE: 77 BPM | TEMPERATURE: 97 F

## 2018-01-23 DIAGNOSIS — L89.312: Primary | ICD-10-CM

## 2018-01-23 PROCEDURE — 99215 OFFICE O/P EST HI 40 MIN: CPT | Mod: PBBFAC | Performed by: NURSE PRACTITIONER

## 2018-01-23 PROCEDURE — 99999 PR PBB SHADOW E&M-EST. PATIENT-LVL V: CPT | Mod: PBBFAC,,, | Performed by: NURSE PRACTITIONER

## 2018-01-23 PROCEDURE — 99212 OFFICE O/P EST SF 10 MIN: CPT | Mod: S$PBB,,, | Performed by: NURSE PRACTITIONER

## 2018-01-23 NOTE — PROGRESS NOTES
Subjective:       Patient ID: Maciel Contreras is a 62 y.o. male.    Chief Complaint: Wound Check    Wound Check       This patient is seen today for reevaluation of a recurrent ulcer to the right ischium.  His medical history is significant for paraplegia.  When transferring with a transfer board he dragged across his transfer board and has a new wound distal to the original wound.  He is using mepilex foam dressing to the wounds.  One of the wounds have healed and the other wound is healing as evidenced by wound contracture.  Because he has weakness in his left shoulder and right wrist, it is more difficult for him to lift when transferring.  He is supposed to be having upcoming orthopedic surgery to address this.  He is afebrile.  He denies increased redness, swelling or purulent drainage.  He does not have any pain from the wounds.    Review of Systems  Unchanged from prior visit.  Objective:      Physical Exam   Constitutional: He is oriented to person, place, and time. He appears well-developed and well-nourished. No distress.   HENT:   Head: Normocephalic and atraumatic.   Musculoskeletal: He exhibits no edema or tenderness.        Legs:  Patient is paraplegic and unable to voluntarily move lower extremities.   Neurological: He is alert and oriented to person, place, and time.   Skin: Skin is warm and dry. No rash noted. He is not diaphoretic. No cyanosis or erythema. Nails show no clubbing.   Psychiatric: He has a normal mood and affect. His behavior is normal. Judgment and thought content normal.   Nursing note and vitals reviewed.        Assessment:       1. Decubitus ulcer of right ischium, stage 2        Plan:           Keep pressure off of bony prominences at all times.  Cleanse wound with wound .  Moisturizer to right leg and foot.  Skin prep to periwound area right ischial ulcer.  Cover with mepilex foam border dressing three times weekly.  [facility] to assist with dressing changes three  times weekly.  Next visit Friday January 26, 2018.  Return to clinic in one month.    Right ischial stage IV decubitus

## 2018-02-23 ENCOUNTER — TELEPHONE (OUTPATIENT)
Dept: WOUND CARE | Facility: CLINIC | Age: 63
End: 2018-02-23

## 2018-06-08 ENCOUNTER — ANESTHESIA (OUTPATIENT)
Dept: SURGERY | Facility: OTHER | Age: 63
End: 2018-06-08
Payer: MEDICARE

## 2018-06-08 ENCOUNTER — HOSPITAL ENCOUNTER (OUTPATIENT)
Facility: OTHER | Age: 63
Discharge: HOME | End: 2018-06-08
Attending: EMERGENCY MEDICINE | Admitting: EMERGENCY MEDICINE
Payer: MEDICARE

## 2018-06-08 ENCOUNTER — ANESTHESIA EVENT (OUTPATIENT)
Dept: SURGERY | Facility: OTHER | Age: 63
End: 2018-06-08
Payer: MEDICARE

## 2018-06-08 VITALS
BODY MASS INDEX: 25.18 KG/M2 | TEMPERATURE: 98 F | HEIGHT: 73 IN | OXYGEN SATURATION: 100 % | RESPIRATION RATE: 16 BRPM | WEIGHT: 190 LBS | HEART RATE: 70 BPM | DIASTOLIC BLOOD PRESSURE: 85 MMHG | SYSTOLIC BLOOD PRESSURE: 153 MMHG

## 2018-06-08 DIAGNOSIS — S31.31XA SCROTAL LACERATION, INITIAL ENCOUNTER: ICD-10-CM

## 2018-06-08 DIAGNOSIS — T14.8XXA SKIN AVULSION: Primary | ICD-10-CM

## 2018-06-08 PROCEDURE — 63600175 PHARM REV CODE 636 W HCPCS: Performed by: NURSE ANESTHETIST, CERTIFIED REGISTERED

## 2018-06-08 PROCEDURE — 13133 CMPLX RPR F/C/C/M/N/AX/G/H/F: CPT | Mod: ,,, | Performed by: UROLOGY

## 2018-06-08 PROCEDURE — 37000008 HC ANESTHESIA 1ST 15 MINUTES: Performed by: UROLOGY

## 2018-06-08 PROCEDURE — 71000033 HC RECOVERY, INTIAL HOUR: Performed by: UROLOGY

## 2018-06-08 PROCEDURE — 99203 OFFICE O/P NEW LOW 30 MIN: CPT | Mod: 25,,, | Performed by: UROLOGY

## 2018-06-08 PROCEDURE — C1729 CATH, DRAINAGE: HCPCS | Performed by: UROLOGY

## 2018-06-08 PROCEDURE — 88300 SURGICAL PATH GROSS: CPT | Mod: 26,,, | Performed by: PATHOLOGY

## 2018-06-08 PROCEDURE — 36000706: Performed by: UROLOGY

## 2018-06-08 PROCEDURE — 25000003 PHARM REV CODE 250: Performed by: NURSE ANESTHETIST, CERTIFIED REGISTERED

## 2018-06-08 PROCEDURE — 71000015 HC POSTOP RECOV 1ST HR: Performed by: UROLOGY

## 2018-06-08 PROCEDURE — G0378 HOSPITAL OBSERVATION PER HR: HCPCS

## 2018-06-08 PROCEDURE — 25000003 PHARM REV CODE 250: Performed by: ANESTHESIOLOGY

## 2018-06-08 PROCEDURE — 88300 SURGICAL PATH GROSS: CPT | Performed by: PATHOLOGY

## 2018-06-08 PROCEDURE — 99284 EMERGENCY DEPT VISIT MOD MDM: CPT

## 2018-06-08 PROCEDURE — 13132 CMPLX RPR F/C/C/M/N/AX/G/H/F: CPT | Mod: ,,, | Performed by: UROLOGY

## 2018-06-08 PROCEDURE — 37000009 HC ANESTHESIA EA ADD 15 MINS: Performed by: UROLOGY

## 2018-06-08 PROCEDURE — 36000707: Performed by: UROLOGY

## 2018-06-08 PROCEDURE — 71000016 HC POSTOP RECOV ADDL HR: Performed by: UROLOGY

## 2018-06-08 PROCEDURE — 25000003 PHARM REV CODE 250: Performed by: UROLOGY

## 2018-06-08 PROCEDURE — 25000003 PHARM REV CODE 250: Performed by: EMERGENCY MEDICINE

## 2018-06-08 RX ORDER — HYDROCODONE BITARTRATE AND ACETAMINOPHEN 5; 325 MG/1; MG/1
1 TABLET ORAL EVERY 4 HOURS PRN
Status: DISCONTINUED | OUTPATIENT
Start: 2018-06-08 | End: 2018-06-08 | Stop reason: HOSPADM

## 2018-06-08 RX ORDER — SODIUM CHLORIDE 0.9 % (FLUSH) 0.9 %
3 SYRINGE (ML) INJECTION
Status: DISCONTINUED | OUTPATIENT
Start: 2018-06-08 | End: 2018-06-08 | Stop reason: HOSPADM

## 2018-06-08 RX ORDER — MIDAZOLAM HYDROCHLORIDE 1 MG/ML
INJECTION INTRAMUSCULAR; INTRAVENOUS
Status: DISCONTINUED | OUTPATIENT
Start: 2018-06-08 | End: 2018-06-08

## 2018-06-08 RX ORDER — ONDANSETRON 2 MG/ML
INJECTION INTRAMUSCULAR; INTRAVENOUS
Status: DISCONTINUED | OUTPATIENT
Start: 2018-06-08 | End: 2018-06-08

## 2018-06-08 RX ORDER — HYDROCODONE BITARTRATE AND ACETAMINOPHEN 5; 325 MG/1; MG/1
1 TABLET ORAL
Status: COMPLETED | OUTPATIENT
Start: 2018-06-08 | End: 2018-06-08

## 2018-06-08 RX ORDER — ONDANSETRON 2 MG/ML
4 INJECTION INTRAMUSCULAR; INTRAVENOUS DAILY PRN
Status: DISCONTINUED | OUTPATIENT
Start: 2018-06-08 | End: 2018-06-08 | Stop reason: HOSPADM

## 2018-06-08 RX ORDER — SODIUM CHLORIDE 9 MG/ML
INJECTION, SOLUTION INTRAVENOUS CONTINUOUS
Status: DISCONTINUED | OUTPATIENT
Start: 2018-06-08 | End: 2018-06-08 | Stop reason: HOSPADM

## 2018-06-08 RX ORDER — OXYCODONE HYDROCHLORIDE 5 MG/1
5 TABLET ORAL
Status: DISCONTINUED | OUTPATIENT
Start: 2018-06-08 | End: 2018-06-08 | Stop reason: HOSPADM

## 2018-06-08 RX ORDER — HYDROCODONE BITARTRATE AND ACETAMINOPHEN 10; 325 MG/1; MG/1
1 TABLET ORAL EVERY 4 HOURS PRN
Status: DISCONTINUED | OUTPATIENT
Start: 2018-06-08 | End: 2018-06-08 | Stop reason: HOSPADM

## 2018-06-08 RX ORDER — MEPERIDINE HYDROCHLORIDE 50 MG/ML
12.5 INJECTION INTRAMUSCULAR; INTRAVENOUS; SUBCUTANEOUS ONCE AS NEEDED
Status: DISCONTINUED | OUTPATIENT
Start: 2018-06-08 | End: 2018-06-08 | Stop reason: HOSPADM

## 2018-06-08 RX ORDER — CIPROFLOXACIN 2 MG/ML
INJECTION, SOLUTION INTRAVENOUS
Status: DISCONTINUED | OUTPATIENT
Start: 2018-06-08 | End: 2018-06-08

## 2018-06-08 RX ORDER — HYDROMORPHONE HYDROCHLORIDE 2 MG/ML
0.4 INJECTION, SOLUTION INTRAMUSCULAR; INTRAVENOUS; SUBCUTANEOUS EVERY 5 MIN PRN
Status: DISCONTINUED | OUTPATIENT
Start: 2018-06-08 | End: 2018-06-08 | Stop reason: HOSPADM

## 2018-06-08 RX ORDER — BACITRACIN ZINC 500 UNIT/G
OINTMENT (GRAM) TOPICAL
Status: DISCONTINUED | OUTPATIENT
Start: 2018-06-08 | End: 2018-06-08 | Stop reason: HOSPADM

## 2018-06-08 RX ORDER — PROPOFOL 10 MG/ML
VIAL (ML) INTRAVENOUS
Status: DISCONTINUED | OUTPATIENT
Start: 2018-06-08 | End: 2018-06-08

## 2018-06-08 RX ORDER — SODIUM CHLORIDE, SODIUM LACTATE, POTASSIUM CHLORIDE, CALCIUM CHLORIDE 600; 310; 30; 20 MG/100ML; MG/100ML; MG/100ML; MG/100ML
INJECTION, SOLUTION INTRAVENOUS CONTINUOUS PRN
Status: DISCONTINUED | OUTPATIENT
Start: 2018-06-08 | End: 2018-06-08

## 2018-06-08 RX ORDER — FENTANYL CITRATE 50 UG/ML
25 INJECTION, SOLUTION INTRAMUSCULAR; INTRAVENOUS EVERY 5 MIN PRN
Status: DISCONTINUED | OUTPATIENT
Start: 2018-06-08 | End: 2018-06-08 | Stop reason: HOSPADM

## 2018-06-08 RX ORDER — FENTANYL CITRATE 50 UG/ML
INJECTION, SOLUTION INTRAMUSCULAR; INTRAVENOUS
Status: DISCONTINUED | OUTPATIENT
Start: 2018-06-08 | End: 2018-06-08

## 2018-06-08 RX ORDER — LIDOCAINE HCL/PF 100 MG/5ML
SYRINGE (ML) INTRAVENOUS
Status: DISCONTINUED | OUTPATIENT
Start: 2018-06-08 | End: 2018-06-08

## 2018-06-08 RX ADMIN — ONDANSETRON 4 MG: 2 INJECTION INTRAMUSCULAR; INTRAVENOUS at 07:06

## 2018-06-08 RX ADMIN — FENTANYL CITRATE 50 MCG: 50 INJECTION, SOLUTION INTRAMUSCULAR; INTRAVENOUS at 07:06

## 2018-06-08 RX ADMIN — CIPROFLOXACIN 400 MG: 2 INJECTION, SOLUTION INTRAVENOUS at 07:06

## 2018-06-08 RX ADMIN — SODIUM CHLORIDE, SODIUM LACTATE, POTASSIUM CHLORIDE, AND CALCIUM CHLORIDE: 600; 310; 30; 20 INJECTION, SOLUTION INTRAVENOUS at 07:06

## 2018-06-08 RX ADMIN — MIDAZOLAM HYDROCHLORIDE 2 MG: 1 INJECTION, SOLUTION INTRAMUSCULAR; INTRAVENOUS at 07:06

## 2018-06-08 RX ADMIN — HYDROCODONE BITARTRATE AND ACETAMINOPHEN 1 TABLET: 5; 325 TABLET ORAL at 04:06

## 2018-06-08 RX ADMIN — PROPOFOL 180 MG: 10 INJECTION, EMULSION INTRAVENOUS at 07:06

## 2018-06-08 RX ADMIN — LIDOCAINE HYDROCHLORIDE 75 MG: 20 INJECTION, SOLUTION INTRAVENOUS at 07:06

## 2018-06-08 RX ADMIN — CARBOXYMETHYLCELLULOSE SODIUM 2 DROP: 2.5 SOLUTION/ DROPS OPHTHALMIC at 07:06

## 2018-06-08 NOTE — OR NURSING
Pt continues without c/o pain at this time. No change from previous assessment. Prepared for transfer to acu.

## 2018-06-08 NOTE — ED NOTES
RN inspected pt posterior. Pt has old scarring from previous skin breakdown. Pt does not have any current open wounds to his posterior and skin is intact.

## 2018-06-08 NOTE — ASSESSMENT & PLAN NOTE
Primary closure in ED  Discussed alternatives risks and benefits  Answered all question  Discussed risk of prosthesis infection

## 2018-06-08 NOTE — HPI
62 male paraplegic    IPP with single cylinder placed in 1980s following removal of an infected penile prosthesis  Voids in diaper    Scrotal laceration occurred around 4am today when transferring from his chair    ED MD not able to close wound in ED    Pt also has previous left hydrocele repair  ED MD noted subQ staple-like material within wound
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
## 2018-06-08 NOTE — PLAN OF CARE
Maciel Contreras has met all discharge criteria from Phase II. Vital Signs are stable, ambulating  without difficulty. Discharge instructions given, patient verbalized understanding. Discharged from facility via wheelchair in stable condition.

## 2018-06-08 NOTE — ANESTHESIA POSTPROCEDURE EVALUATION
"Anesthesia Post Evaluation    Patient: Maciel Contreras    Procedure(s) Performed: Procedure(s) (LRB):  REPAIR, LACERATION SCROTUM (N/A)    Final Anesthesia Type: general  Patient location during evaluation: PACU  Patient participation: Yes- Able to Participate  Level of consciousness: awake and alert  Post-procedure vital signs: reviewed and stable  Pain management: adequate  Airway patency: patent  PONV status at discharge: No PONV  Anesthetic complications: no      Cardiovascular status: blood pressure returned to baseline  Respiratory status: unassisted, spontaneous ventilation and room air  Hydration status: euvolemic  Follow-up not needed.        Visit Vitals  BP (!) 164/85 (BP Location: Left arm, Patient Position: Lying)   Pulse 72   Temp 36.5 °C (97.7 °F) (Oral)   Resp 16   Ht 6' 1" (1.854 m)   Wt 86.2 kg (190 lb)   SpO2 99%   BMI 25.07 kg/m²       Pain/Eduar Score: Pain Rating Prior to Med Admin: 5 (6/8/2018  4:58 AM)      "

## 2018-06-08 NOTE — OP NOTE
DATE OF PROCEDURE:  06/08/2018.    SURGEON:  Poli Streeter M.D.    PROCEDURE:  Repair of scrotal laceration complex and removal of foreign body.    PREOPERATIVE DIAGNOSES:  1.  Complex scrotal laceration.  2.  Paraplegia.  3.  Neurogenic bladder.  4.  Penile prosthesis.  5.  Foreign body and wound.    POSTOPERATIVE DIAGNOSES:  1.  Complex scrotal laceration.  2.  Paraplegia.  3.  Neurogenic bladder.  4.  Penile prosthesis.  5.  Foreign body and wound.    HISTORY:  Mr. Contreras is a 62-year-old paraplegic man.  He has a long complex   urologic history.  None of his old records are available.  He tells me that he   underwent a penile prosthesis in the 1980s.  This became infected and was then   placed at the time of replacement and only a single cylinder was placed.  He   also has had a left hydrocelectomy.  He also has a right hydrocele, which he   planned to have repair in the future.  Approximately 4:00 a.m. this morning, he   suffered a scrotal laceration when transferring from his wheelchair.  He was   seen in the Emergency Room and the laceration was too extensive for closure at   the bedside.    On exam, there is a flap-like laceration approximately 9 cm in length.  The   Emergency Room physician reported the spermatic cord was exposed;   however, this is not the case.  The subcutaneous tissues are intact.  The   spermatic cord and testis and epididymis are not visible.  There is a small   staple like metallic foreign body in the wound, which the ER doctor was not able   to remove.  The informed consent was obtained and the patient was brought to   the OR.  Preoperative Cipro was administered.  Sterile prep and drape was   performed.  A timeout is performed.  There is a stable like metallic foreign   body, which was removed with a pair of hemostats and sent for pathologic   identification.  The wound was thoroughly irrigated with bacitracin ointment.    Hemostasis was achieved with gentle Bovie cautery.  There  was a flap of tissue   which was well vascularized and did not require debridement.  The laceration was   in a Chevron shape.  The laceration was closed in a single layer with   interrupted 4-0 chromic sutures.  Bacitracin ointment is applied.  The wound was   dry at the end of the case.  Fluffs and a scrotal support applied along with   an ice pack.  The patient tolerated the procedure well.  Meng catheter was   placed during the case because the patient is incontinent.  We will leave the   Meng in place for 1 week.  He will follow up with the nurse practitioner in one   week for a wound check.  If the wound is healing well, we will plan to remove   the Meng catheter at that time.      ELENA/DREAD  dd: 06/08/2018 08:28:56 (CDT)  td: 06/08/2018 12:40:44 (CDT)  Doc ID   #[id number]  Job ID #[id number]    CC:
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
## 2018-06-08 NOTE — CONSULTS
[facility]  Urology  Consult Note    Patient Name: Maciel Contreras  MRN: [id number]  Admission Date: 6/8/2018  Hospital Length of Stay: 0   Code Status: Prior   Attending Provider: No att. providers found   Consulting Provider: Poli Streeter MD  Primary Care Physician: Timur Caldwell MD  Principal Problem:<principal problem not specified>    Consults    Subjective:     HPI:  62 male paraplegic    IPP with single cylinder placed in 1980s following removal of an infected penile prosthesis  Voids in diaper    Scrotal laceration occurred around 4am today when transferring from his chair    ED MD not able to close wound in ED    Pt also has previous left hydrocele repair  ED MD noted subQ staple-like material within wound    No new subjective & objective note has been filed under this hospital service since the last note was generated.      Assessment and Plan:     Scrotal laceration, initial encounter    Primary closure in ED  Discussed alternatives risks and benefits  Answered all question  Discussed risk of prosthesis infection            VTE Risk Mitigation     None          Thank you for your consult. -    Poli Streeter MD  Urology  [facility]

## 2018-06-08 NOTE — DISCHARGE INSTRUCTIONS
KEEP SURGICAL DRESSING CLEAN, DRY, AND INTACT UNTIL POST-OP APPOINTMENT.  WEAR SCROTAL SLING FOR SUPPORT.  APPLY ICE PACK TO SCROTUM FOR 30 MINS THROUGHOUT THE DAY.    Anesthesia: After Your Surgery  Youve just had surgery. During surgery, you received medication called anesthesia to keep you comfortable and pain-free. After surgery, you may experience some pain or nausea. This is common. Here are some tips for feeling better and recovering after surgery.    Going home  Your doctor or nurse will show you how to take care of yourself when you go home. He or she will also answer your questions. Have an adult family member or friend drive you home. For the first 24 hours after your surgery:  · Do not drive or use heavy equipment.  · Do not make important decisions or sign legal documents.  · Avoid alcohol.  · Have someone stay with you, if needed. He or she can watch for problems and help keep you safe.  Be sure to keep all follow-up appointments with your doctor. And rest after your procedure for as long as your doctor tells you to.    Coping with pain  If you have pain after surgery, pain medication will help you feel better. Take it as directed, before pain becomes severe. Also, ask your doctor or pharmacist about other ways to control pain, such as with heat, ice, and relaxation. And follow any other instructions your surgeon or nurse gives you.    Tips for taking pain medication  To get the best relief possible, remember these points:  · Pain medications can upset your stomach. Taking them with a little food may help.  · Most pain relievers taken by mouth need at least 20 to 30 minutes to take effect.  · Taking medication on a schedule can help you remember to take it. Try to time your medication so that you can take it before beginning an activity, such as dressing, walking, or sitting down for dinner.  · Constipation is a common side effect of pain medications. Contact your doctor before taking any  medications like laxatives or stool softeners to help relieve constipation. Also ask about any dietary restrictions, because drinking lots of fluids and eating foods like fruits and vegetables that are high in fiber can also help. Remember, dont take laxatives unless your surgeon has prescribed them.  · Mixing alcohol and pain medication can cause dizziness and slow your breathing. It can even be fatal. Dont drink alcohol while taking pain medication.  · Pain medication can slow your reflexes. Dont drive or operate machinery while taking pain medication.  If your health care provider tells you to take acetaminophen to help relieve your pain, ask him or her how much you are supposed to take each day. (Acetaminophen is the generic name for Tylenol and other brand-name pain relievers.) Acetaminophen or other pain relievers may interact with your prescription medicines or other over-the-counter (OTC) drugs. Some prescription medications contain acetaminophen along with other active ingredients. Using both prescription and OTC acetaminophen for pain can cause you to overdose. The FDA recommends that you read the labels on your OTC medications carefully. This will help you to clearly understand the list of active ingredients, dosing instructions, and any warnings. It may also help you avoid taking too much acetaminophen. If you have questions or don't understand the information, ask your pharmacist or health care provider to explain it to you before you take the OTC medication.    Managing nausea  Some people have an upset stomach after surgery. This is often due to anesthesia, pain, pain medications, or the stress of surgery. The following tips will help you manage nausea and get good nutrition as you recover. If you were on a special diet before surgery, ask your doctor if you should follow it during recovery. These tips may help:  · Dont push yourself to eat. Your body will tell you when to eat and how  much.  · Start off with clear liquids and soup. They are easier to digest.  · Progress to semi-solid foods (mashed potatoes, applesauce, and gelatin) as you feel ready.  · Slowly move to solid foods. Dont eat fatty, rich, or spicy foods at first.  · Dont force yourself to have three large meals a day. Instead, eat smaller amounts more often.  · Take pain medications with a small amount of solid food, such as crackers or toast to avoid nausea.      Call your surgeon if  · You still have pain an hour after taking medication (it may not be strong enough).  · You feel too sleepy, dizzy, or groggy (medication may be too strong).  · You have side effects like nausea, vomiting, or skin changes (rash, itching, or hives).   © 8548-2556 [employer]. [address] [place] [address]. All rights reserved. This information is not intended as a substitute for professional medical care. Always follow your healthcare professional's instructions.            Discharge Instructions: Caring for Your Indwelling Urinary Catheter  You have been discharged with an indwelling urinary catheter (also called a Meng catheter). A catheter is a thin, flexible tube. An indwelling urinary catheter has two parts. The first part is a tube that drains urine from your bladder. The second part is a bag or other device that collects the urine.  The most important thing to remember is that you want to prevent infection. Always wash your hands before handling your catheter bag or tubing.  Draining the bedside bag  · Wash your hands with soap and clean, running water or use an alcohol-based hand  that contains at least 60% alcohol.  · Hold the drainage tube over a toilet or measuring container.  · Unclamp the tube and let the bag drain.  · Dont touch the tip of the drainage tube or let it touch the toilet or container.  Cleaning the drainage tube  · When the bag is empty, clean the tip of the drainage tube with an  alcohol wipe.  · Clamp the tube.  · Reinsert the tube into the pocket on the drainage bag.  Cleaning your skin and tubing  · Clean the skin near the catheter with soap and water.  · Wash your genital area from front to back.  · Wash the catheter tubing. Always wash the catheter in the direction away from your body.  · You will be told when and how to change your bag and tubing.  · Dont try to remove the catheter by yourself.  · You may shower with the catheter in place.  Emptying a leg bag  · Wash your hands.  · Remove the stopper on the bag.  · Drain the bag into the toilet or a measuring container. Dont let the tip of the drainage tube touch anything, including your fingers.  · Clean the tip of the drainage tube with alcohol.  · Replace the stopper.  Follow-up care  Make a follow-up appointment as directed by your healthcare provider  When to call your healthcare provider  Call your healthcare provider right away if you have any of the following:  · Chills or fever above 100.4°F (38°C)  · Leakage around the catheter insertion site  · Increased spasms (uncontrollable twitching) in your legs, abdomen, or bladder. Occasional mild spasms are normal.  · Burning in the urinary tract, penis, or genital area  · Nausea and vomiting  · Aching in the lower back  · Cloudy or bloody (pink or red) urine, sediment or mucus in the urine, or foul-smelling urine   Date Last Reviewed: 1/1/2017  © 6891-9018 [employer]. [address] [place] [address], [place] [address]. All rights reserved. This information is not intended as a substitute for professional medical care. Always follow your healthcare professional's instructions.

## 2018-06-08 NOTE — TRANSFER OF CARE
"Anesthesia Transfer of Care Note    Patient: Maciel Contreras    Procedure(s) Performed: Procedure(s) (LRB):  REPAIR, LACERATION SCROTUM (N/A)    Patient location: PACU    Anesthesia Type: general    Transport from OR: Transported from OR on 2-3 L/min O2 by NC with adequate spontaneous ventilation    Post pain: adequate analgesia    Post assessment: no apparent anesthetic complications    Post vital signs: stable    Level of consciousness: awake, alert and oriented    Nausea/Vomiting: no nausea/vomiting    Complications: none    Transfer of care protocol was followed      Last vitals:   Visit Vitals  BP (!) 168/84   Pulse 66   Temp 36.7 °C (98.1 °F) (Oral)   Resp 18   Ht 6' 1" (1.854 m)   Wt 86.2 kg (190 lb)   SpO2 97%   BMI 25.07 kg/m²     "

## 2018-06-08 NOTE — ANESTHESIA PREPROCEDURE EVALUATION
06/08/2018  Maciel Contreras is a 62 y.o., male.    Anesthesia Evaluation    I have reviewed the Patient Summary Reports.    I have reviewed the Nursing Notes.   I have reviewed the Medications.     Review of Systems  Anesthesia Hx:  History of prior surgery of interest to airway management or planning: Denies Family Hx of Anesthesia complications.   Denies Personal Hx of Anesthesia complications.   Social:  Smoker    Hematology/Oncology:     Oncology Normal     Cardiovascular:   Hypertension    Pulmonary:  Pulmonary Normal    Renal/:   Chronic Renal Disease (single kidney s/s nephrectomy)    Hepatic/GI:   GERD    Neurological:   T-10-T-11 spinal paralysis   Psych:   anxiety          Physical Exam  General:  Well nourished    Airway/Jaw/Neck:  Airway Findings: Mouth Opening: Normal Tongue: Normal  General Airway Assessment: Adult  Mallampati: II  TM Distance: Normal, at least 6 cm      Dental:  Dental Findings: In tact, upper partial dentures        Mental Status:  Mental Status Findings:  Alert and Oriented, Cooperative         Anesthesia Plan  Type of Anesthesia, risks & benefits discussed:  Anesthesia Type:  general  Patient's Preference:   Intra-op Monitoring Plan: standard ASA monitors  Intra-op Monitoring Plan Comments:   Post Op Pain Control Plan: multimodal analgesia  Post Op Pain Control Plan Comments:   Induction:   IV  Beta Blocker:         Informed Consent: Patient understands risks and agrees with Anesthesia plan.  Questions answered. Anesthesia consent signed with patient.  ASA Score: 3     Day of Surgery Review of History & Physical:    H&P update referred to the surgeon.         Ready For Surgery From Anesthesia Perspective.

## 2018-06-08 NOTE — ED TRIAGE NOTES
Pt arrived to ED 8 via personal wheelchair. Pt able to transfer into ED bed with assistance from RN. Pt complains of pain and bleeding from L testicle post transferring from wheelchair to bed. RN placed pt in gown and assisted pt to remove pants. Pt has noted oozing blood from the left testicle area and the skin appears to be avulsed. Pt states his pain is 3/10 but has decreased sensation as he ir a paraplegic. Pt denies taking blood thinners. RN applied non adherent dressing to area and reinforced with sterile guaze. Bleeding is controlled and MD is aware. Pt denies: CP, SHOB, N/V/D or any other complaints.
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
## 2018-06-11 ENCOUNTER — TELEPHONE (OUTPATIENT)
Dept: UROLOGY | Facility: CLINIC | Age: 63
End: 2018-06-11

## 2018-06-11 NOTE — TELEPHONE ENCOUNTER
----- Message from Pratibha Gilliam sent at 6/11/2018 12:58 PM CDT -----  Name of Who is Calling: SUSANA BURNETT [[id number]]      What is the request in detail: Pt states his bandages from surgery are starting to come off. Pt wants to know if he should come it to get them changed.      Can the clinic reply by [facility]:   No       What Number to Call Back if not in [facility]: [phone]

## 2018-06-11 NOTE — TELEPHONE ENCOUNTER
I spoke to pt.  D/c papers said to keep dressing on until seen by NP next week.  He called to report dressing has fallen of and asked for direction.  I told him to place dry gauze over wound and wear Jockeys to keep in place, and that I would send you a message to ask.

## 2018-06-11 NOTE — TELEPHONE ENCOUNTER
I spoke to pt and let him know that Dr. Streeter agreed with present plan.  He will see us in one week.

## 2018-06-13 ENCOUNTER — OFFICE VISIT (OUTPATIENT)
Dept: UROLOGY | Facility: CLINIC | Age: 63
End: 2018-06-13
Payer: MEDICARE

## 2018-06-13 VITALS
HEIGHT: 73 IN | BODY MASS INDEX: 25.18 KG/M2 | WEIGHT: 190 LBS | HEART RATE: 80 BPM | DIASTOLIC BLOOD PRESSURE: 89 MMHG | SYSTOLIC BLOOD PRESSURE: 148 MMHG

## 2018-06-13 DIAGNOSIS — S31.31XD LACERATION OF SCROTUM, SUBSEQUENT ENCOUNTER: Primary | ICD-10-CM

## 2018-06-13 PROCEDURE — 12004 RPR S/N/AX/GEN/TRK7.6-12.5CM: CPT | Mod: 58,S$GLB,, | Performed by: UROLOGY

## 2018-06-13 PROCEDURE — 99024 POSTOP FOLLOW-UP VISIT: CPT | Mod: S$GLB,,, | Performed by: NURSE PRACTITIONER

## 2018-06-13 RX ORDER — LIDOCAINE HYDROCHLORIDE AND EPINEPHRINE 20; 10 MG/ML; UG/ML
5 INJECTION, SOLUTION INFILTRATION; PERINEURAL
Status: COMPLETED | OUTPATIENT
Start: 2018-06-13 | End: 2018-06-13

## 2018-06-13 RX ORDER — SULFAMETHOXAZOLE AND TRIMETHOPRIM 800; 160 MG/1; MG/1
1 TABLET ORAL 2 TIMES DAILY
Qty: 20 TABLET | Refills: 0 | Status: SHIPPED | OUTPATIENT
Start: 2018-06-13 | End: 2018-06-22 | Stop reason: SDUPTHER

## 2018-06-13 RX ADMIN — LIDOCAINE HYDROCHLORIDE AND EPINEPHRINE 5 ML: 20; 10 INJECTION, SOLUTION INFILTRATION; PERINEURAL at 12:06

## 2018-06-13 NOTE — PROCEDURES
Procedures   06/13/2018    Closure of scrotal laceration    Surgeon: Judson Adams MD  Consent: Written  Anesthesia: Local - 5cc 2% lidocaine w/ epi  Prep: Betadine  Wound Size: 9cm  Location: Scrotum  Closure: Single layer    Procedure:  The wound was cleaned with copious betadine and then sterile water. There was no exudate or evidence of skin necrosis. There was no evidence of testicular exposure, as noted previously. After injecting lidocaine into skin edges, the wound edges were reapproximated using simple and horizontal mattress interrupted 2-0 nylon sutures. The skin edges easily pulled together and some bleeding was noted from both edges during suturing. A sterile dressing was then applied. He tolerated the procedure well.

## 2018-06-13 NOTE — PROGRESS NOTES
"Subjective:      Maciel Contreras is a 62 y.o. male who presents today for evaluation of his recent scrotal laceration. The patient was recently seen by Dr. Streeter in the ED and is new to me today.       The patient has a long complex urologic history. He underwent penile prosthesis in the 1980s requiring revision due to infetion and left hydrocelectomy.         He presented to the ED on 6/8 with scrotal laceration which occurred while transferring from his wheelchair. He is a paraplegic. Dr. Streeter repaired the laceration in the OR on 6/8. He presents today reporting that the "sutures fell out last night along with a large clot this morning." Mneg remains in place draining clear, danie urine. At baseline, he is incontinence. He is on macrobid currently. Denies fever/chills. States that he can only feel a "mild aching" in the scrotal area.     The following portions of the patient's history were reviewed and updated as appropriate: allergies, current medications, past family history, past medical history, past social history, past surgical history and problem list.    Review of Systems  Constitutional: no fever or chills  ENT: no nasal congestion or sore throat  Respiratory: no cough or shortness of breath  Cardiovascular: no chest pain or palpitations  Gastrointestinal: no nausea or vomiting, tolerating diet  Genitourinary: as per HPI  Hematologic/Lymphatic: no easy bruising or lymphadenopathy  Musculoskeletal: paraplegic, wheelchair bound   Neurological: no seizures or tremors  Behavioral/Psych: no auditory or visual hallucinations     Objective:   Vitals:  Vitals:    06/13/18 0901   BP: (!) 148/89   Pulse: 80     Physical Exam   General: alert and oriented, no acute distress  Head: normocephalic, atraumatic  Neck: supple, no lymphadenopathy, normal ROM, no masses  Respiratory: Symmetric expansion, non-labored breathing  Cardiovascular: regular rate and rhythm, nomal pulses, no peripheral edema  Abdomen: soft, " non tender, non distended, no palpable masses, no hernias, no hepatomegaly or splenomegaly  Genitourinary: flap-like laceration approximately 9 cm in length noted to left side of scrotum. The subcutaneous tissues are intact. No purulent drainage noted.    Lymphatic: no inguinal nodes  Skin: laceration noted to scrotum   Neuro: paraplegic, WC bound   Psych: normal judgment and insight, normal mood/affect and non-anxious    Lab Review   Urinalysis demonstrates: no sample   Lab Results   Component Value Date    WBC 12.58 11/25/2015    HGB 15.1 11/25/2015    HCT 43.4 11/25/2015    MCV 88 11/25/2015     11/25/2015     Lab Results   Component Value Date    CREATININE 1.0 07/08/2016    BUN 13 07/08/2016     Lab Results   Component Value Date    PSA 0.82 02/25/2013     Imaging   None    Assessment:   Laceration of scrotum, subsequent encounter    Plan:   Maciel was seen today for post-op evaluation and groin pain.    Diagnoses and all orders for this visit:    Laceration of scrotum, subsequent encounter  -     sulfamethoxazole-trimethoprim 800-160mg (BACTRIM DS) 800-160 mg Tab; Take 1 tablet by mouth 2 (two) times daily.    Plan:  --D/C macrobid and start bactrim  --Scrotal laceration was sutured closed by Dr. Adams   --Follow up in 1 week for wound recheck and possible garcia removal

## 2018-06-14 NOTE — DISCHARGE SUMMARY
[facility]  Discharge Note  Short Stay    Admit Date: 6/8/2018    Discharge Date and Time: 6/8/2018  5:21 PM     Attending Physician: No att. providers found     Discharge Provider: Poli Streeter    Diagnoses:  Active Hospital Problems    Diagnosis  POA   No active problems to display.      Resolved Hospital Problems    Diagnosis Date Resolved POA    Scrotal laceration, initial encounter [S31.31XA] 06/08/2018 Yes    Skin avulsion [T14.8XXA] 06/08/2018 Yes       Discharged Condition: good    Hospital Course: Patient was admitted for an outpatient procedure and tolerated the procedure well with no complications.    Final Diagnoses: Same as principal problem.    Disposition: Home or Self Care    Follow up/Patient Instructions:    Medications:  Reconciled Home Medications:      Medication List      CONTINUE taking these medications    amoxicillin-clavulanate 875-125mg 875-125 mg per tablet  Commonly known as:  AUGMENTIN  Take 1 tablet by mouth 2 (two) times daily.     atenolol-chlorthalidone 100-25 mg per tablet  Commonly known as:  TENORETIC  Take 1 tablet by mouth once daily.     carisoprodol 350 MG tablet  Commonly known as:  SOMA  Take 1 tablet (350 mg total) by mouth 3 (three) times daily as needed.     ciprofloxacin HCl 500 MG tablet  Commonly known as:  CIPRO  TAKE 1 TABLET (500 MG TOTAL) BY MOUTH 2 (TWO) TIMES DAILY.     clobetasol 0.05 % cream  Commonly known as:  TEMOVATE  Apply topically 2 (two) times daily.     cyclobenzaprine 10 MG tablet  Commonly known as:  FLEXERIL  Take 1 tablet by mouth 3 (three) times daily.     docusate sodium 100 MG capsule  Commonly known as:  COLACE  Take 1 capsule (100 mg total) by mouth 2 (two) times daily as needed for Constipation.     gabapentin 300 MG capsule  Commonly known as:  NEURONTIN  Take 1 capsule (300 mg total) by mouth 3 (three) times daily. For left arm pain.     lactulose 20 gram/30 mL Soln  Commonly known as:  CHRONULAC  1-2 tablespoons daily for  constipation     LORazepam 2 MG Tab  Commonly known as:  ATIVAN  TAKE 1 TABLET (2 MG TOTAL) BY MOUTH 2 (TWO) TIMES DAILY.     meloxicam 15 MG tablet  Commonly known as:  MOBIC  TAKE 1 TABLET (15 MG TOTAL) BY MOUTH DAILY AS NEEDED FOR PAIN.     mometasone 50 mcg/actuation nasal spray  Commonly known as:  NASONEX  1 spray by Nasal route once daily.     oxyCODONE-acetaminophen  mg per tablet  Commonly known as:  PERCOCET  Take 1 tablet by mouth 3 (three) times daily as needed.     potassium chloride SA 20 MEQ tablet  Commonly known as:  K-DUR,KLOR-CON  Take 1 tablet (20 mEq total) by mouth once daily.     temazepam 30 mg capsule  Commonly known as:  RESTORIL  TAKE 1 CAPSULE (30 MG TOTAL) BY MOUTH NIGHTLY AS NEEDED FOR INSOMNIA.     traMADol 50 mg tablet  Commonly known as:  ULTRAM  TAKE 1 TO 2 TABLETS BY MOUTH THREE TIMES DAILY AS NEEDED FOR PAIN     vardenafil 20 MG tablet  Commonly known as:  LEVITRA  Take 1 tablet (20 mg total) by mouth daily as needed for Erectile Dysfunction.     zolpidem 10 mg Tab  Commonly known as:  AMBIEN  TAKE 1 TABLET (10 MG TOTAL) BY MOUTH NIGHTLY AS NEEDED.            Discharge Procedure Orders  Diet general     Activity as tolerated     Call MD for:  temperature >100.4     Call MD for:  persistent nausea and vomiting     Call MD for:  severe uncontrolled pain     Call MD for:  difficulty breathing, headache or visual disturbances     Call MD for:  redness, tenderness, or signs of infection (pain, swelling, redness, odor or green/yellow discharge around incision site)     Call MD for:  hives     Call MD for:  persistent dizziness or light-headedness     Call MD for:  extreme fatigue     Call MD for:   Order Comments: Call MD or go to the nearest ER if you are unable to urinate.     Nursing communication   Order Comments: Pt will go home with garcia  Please give leg bag for daytime use and large bag for nighttime use     Nursing communication   Order Comments: Ice pack to  scrotum  Scrotal support       Follow-up Information     Stephanie Bennett NP In 1 week.    Specialty:  Urology  Contact information:  [address] [facility] [id number] [phone]                   Discharge Procedure Orders (must include Diet, Follow-up, Activity):    Discharge Procedure Orders (must include Diet, Follow-up, Activity)  Diet general     Activity as tolerated     Call MD for:  temperature >100.4     Call MD for:  persistent nausea and vomiting     Call MD for:  severe uncontrolled pain     Call MD for:  difficulty breathing, headache or visual disturbances     Call MD for:  redness, tenderness, or signs of infection (pain, swelling, redness, odor or green/yellow discharge around incision site)     Call MD for:  hives     Call MD for:  persistent dizziness or light-headedness     Call MD for:  extreme fatigue     Call MD for:   Order Comments: Call MD or go to the nearest ER if you are unable to urinate.     Nursing communication   Order Comments: Pt will go home with garcia  Please give leg bag for daytime use and large bag for nighttime use     Nursing communication   Order Comments: Ice pack to scrotum  Scrotal support

## 2018-06-20 ENCOUNTER — OFFICE VISIT (OUTPATIENT)
Dept: UROLOGY | Facility: CLINIC | Age: 63
End: 2018-06-20
Payer: MEDICARE

## 2018-06-20 VITALS
DIASTOLIC BLOOD PRESSURE: 85 MMHG | BODY MASS INDEX: 25.18 KG/M2 | SYSTOLIC BLOOD PRESSURE: 128 MMHG | WEIGHT: 190 LBS | HEIGHT: 73 IN | HEART RATE: 76 BPM

## 2018-06-20 DIAGNOSIS — S31.31XD LACERATION OF SCROTUM, SUBSEQUENT ENCOUNTER: Primary | ICD-10-CM

## 2018-06-20 PROCEDURE — 99213 OFFICE O/P EST LOW 20 MIN: CPT | Mod: S$GLB,,, | Performed by: NURSE PRACTITIONER

## 2018-06-20 NOTE — H&P
"Subjective:      Maciel Contreras is a 62 y.o. male who presents today for evaluation of his recent scrotal laceration.        The patient has a long complex urologic history. He underwent penile prosthesis in the 1980s requiring revision due to infetion and left hydrocelectomy.         He presented to the ED on 6/8 with scrotal laceration which occurred while transferring from his wheelchair. He is a paraplegic. Dr. Streeter repaired the laceration in the OR on 6/8. He presented to the office on 6/13 for wound recheck and reported that the "sutures fell out along with a large clot." Meng remained in place draining clear, danie urine. At baseline, he is incontinent. Dr. Adams re-approximated the edges with nylon 2-0 sutures at follow up visit on 6/13. Antibiotics were adjusted from macrobid to bactrim.    Today he reports that the incision "opened on Monday." He has been cleaning the incision well and taking bactrim BID. Meng remains in place draining clear, danie urine. He denies pain at the site. Denies fever/chills.     The following portions of the patient's history were reviewed and updated as appropriate: allergies, current medications, past family history, past medical history, past social history, past surgical history and problem list.    Review of Systems  Constitutional: no fever or chills  ENT: no nasal congestion or sore throat  Respiratory: no cough or shortness of breath  Cardiovascular: no chest pain or palpitations  Gastrointestinal: no nausea or vomiting, tolerating diet  Genitourinary: as per HPI  Hematologic/Lymphatic: no easy bruising or lymphadenopathy  Musculoskeletal: paraplegic, wheelchair bound   Neurological: no seizures or tremors  Behavioral/Psych: no auditory or visual hallucinations     Objective:   Vitals:  Vitals:    06/20/18 1402   BP: 128/85   Pulse: 76     Physical Exam   General: alert and oriented, no acute distress  Head: normocephalic, atraumatic  Neck: supple, no " lymphadenopathy, normal ROM, no masses  Respiratory: Symmetric expansion, non-labored breathing  Cardiovascular: regular rate and rhythm, nomal pulses, no peripheral edema  Abdomen: soft, non tender, non distended, no palpable masses, no hernias, no hepatomegaly or splenomegaly  Genitourinary: flap-like laceration approximately 9 cm in length noted to left side of scrotum. Sutures not intact. The subcutaneous tissues are intact. No necrosis or purulent drainage noted.   Lymphatic: no inguinal nodes  Skin: laceration noted to scrotum   Neuro: paraplegic, WC bound   Psych: normal judgment and insight, normal mood/affect and non-anxious    Lab Review   Urinalysis demonstrates: no sample   Lab Results   Component Value Date    WBC 12.58 11/25/2015    HGB 15.1 11/25/2015    HCT 43.4 11/25/2015    MCV 88 11/25/2015     11/25/2015     Lab Results   Component Value Date    CREATININE 1.0 07/08/2016    BUN 13 07/08/2016     Lab Results   Component Value Date    PSA 0.82 02/25/2013     Imaging   None    Assessment:   Laceration of scrotum, subsequent encounter    Plan:   Maciel was seen today for follow-up.    Diagnoses and all orders for this visit:    Laceration of scrotum, subsequent encounter  -     Case Request Operating Room: Repair of scrotal laceration  -     Insert peripheral IV; Standing  -     Diet NPO; Standing  -     Place sequential compression device; Standing  -     Place in Outpatient; Standing    Scrotal laceration    Other orders  -     IP VTE LOW RISK PATIENT; Standing  -     vancomycin (VANCOCIN) 1,500 mg in sodium chloride 0.9% 500 mL IVPB; Inject 1,500 mg into the vein On call Procedure (surgery).    Plan:  --Continue bactrim  --Discussed treatment options with the patient. Will proceed with laceration repair in the OR tomorrow with Dr. Adams. All questions answered. Consents will be signed in pre op tomorrow

## 2018-06-21 ENCOUNTER — ANESTHESIA (OUTPATIENT)
Dept: SURGERY | Facility: OTHER | Age: 63
End: 2018-06-21
Payer: MEDICARE

## 2018-06-21 ENCOUNTER — HOSPITAL ENCOUNTER (OUTPATIENT)
Facility: OTHER | Age: 63
Discharge: HOME | End: 2018-06-21
Attending: UROLOGY | Admitting: UROLOGY
Payer: MEDICARE

## 2018-06-21 ENCOUNTER — ANESTHESIA EVENT (OUTPATIENT)
Dept: SURGERY | Facility: OTHER | Age: 63
End: 2018-06-21
Payer: MEDICARE

## 2018-06-21 VITALS
DIASTOLIC BLOOD PRESSURE: 67 MMHG | TEMPERATURE: 98 F | OXYGEN SATURATION: 98 % | BODY MASS INDEX: 25.18 KG/M2 | RESPIRATION RATE: 18 BRPM | HEIGHT: 73 IN | WEIGHT: 190 LBS | HEART RATE: 74 BPM | SYSTOLIC BLOOD PRESSURE: 133 MMHG

## 2018-06-21 DIAGNOSIS — S31.31XA: ICD-10-CM

## 2018-06-21 DIAGNOSIS — S31.31XD LACERATION OF SCROTUM, SUBSEQUENT ENCOUNTER: ICD-10-CM

## 2018-06-21 PROCEDURE — 63600175 PHARM REV CODE 636 W HCPCS: Performed by: NURSE ANESTHETIST, CERTIFIED REGISTERED

## 2018-06-21 PROCEDURE — 71000015 HC POSTOP RECOV 1ST HR: Performed by: UROLOGY

## 2018-06-21 PROCEDURE — 36000706: Performed by: UROLOGY

## 2018-06-21 PROCEDURE — 71000016 HC POSTOP RECOV ADDL HR: Performed by: UROLOGY

## 2018-06-21 PROCEDURE — 25000003 PHARM REV CODE 250: Performed by: NURSE ANESTHETIST, CERTIFIED REGISTERED

## 2018-06-21 PROCEDURE — 37000008 HC ANESTHESIA 1ST 15 MINUTES: Performed by: UROLOGY

## 2018-06-21 PROCEDURE — 13132 CMPLX RPR F/C/C/M/N/AX/G/H/F: CPT | Mod: ,,, | Performed by: UROLOGY

## 2018-06-21 PROCEDURE — 25000003 PHARM REV CODE 250: Performed by: ANESTHESIOLOGY

## 2018-06-21 PROCEDURE — 71000033 HC RECOVERY, INTIAL HOUR: Performed by: UROLOGY

## 2018-06-21 PROCEDURE — 63600175 PHARM REV CODE 636 W HCPCS: Performed by: UROLOGY

## 2018-06-21 PROCEDURE — 37000009 HC ANESTHESIA EA ADD 15 MINS: Performed by: UROLOGY

## 2018-06-21 PROCEDURE — 13133 CMPLX RPR F/C/C/M/N/AX/G/H/F: CPT | Mod: ,,, | Performed by: UROLOGY

## 2018-06-21 PROCEDURE — 25000003 PHARM REV CODE 250: Performed by: UROLOGY

## 2018-06-21 PROCEDURE — 36000707: Performed by: UROLOGY

## 2018-06-21 PROCEDURE — 63600175 PHARM REV CODE 636 W HCPCS: Performed by: ANESTHESIOLOGY

## 2018-06-21 PROCEDURE — 71000039 HC RECOVERY, EACH ADD'L HOUR: Performed by: UROLOGY

## 2018-06-21 RX ORDER — SODIUM CHLORIDE, SODIUM LACTATE, POTASSIUM CHLORIDE, CALCIUM CHLORIDE 600; 310; 30; 20 MG/100ML; MG/100ML; MG/100ML; MG/100ML
INJECTION, SOLUTION INTRAVENOUS CONTINUOUS PRN
Status: DISCONTINUED | OUTPATIENT
Start: 2018-06-21 | End: 2018-06-21

## 2018-06-21 RX ORDER — PHENYLEPHRINE HYDROCHLORIDE 10 MG/ML
INJECTION INTRAVENOUS
Status: DISCONTINUED | OUTPATIENT
Start: 2018-06-21 | End: 2018-06-21

## 2018-06-21 RX ORDER — ACETAMINOPHEN 10 MG/ML
INJECTION, SOLUTION INTRAVENOUS
Status: DISCONTINUED | OUTPATIENT
Start: 2018-06-21 | End: 2018-06-21

## 2018-06-21 RX ORDER — MEPERIDINE HYDROCHLORIDE 50 MG/ML
12.5 INJECTION INTRAMUSCULAR; INTRAVENOUS; SUBCUTANEOUS ONCE AS NEEDED
Status: DISCONTINUED | OUTPATIENT
Start: 2018-06-21 | End: 2018-06-21 | Stop reason: HOSPADM

## 2018-06-21 RX ORDER — HYDROMORPHONE HYDROCHLORIDE 2 MG/ML
0.4 INJECTION, SOLUTION INTRAMUSCULAR; INTRAVENOUS; SUBCUTANEOUS EVERY 5 MIN PRN
Status: DISCONTINUED | OUTPATIENT
Start: 2018-06-21 | End: 2018-06-21 | Stop reason: HOSPADM

## 2018-06-21 RX ORDER — OXYCODONE HYDROCHLORIDE 5 MG/1
5 TABLET ORAL
Status: DISCONTINUED | OUTPATIENT
Start: 2018-06-21 | End: 2018-06-21 | Stop reason: HOSPADM

## 2018-06-21 RX ORDER — FENTANYL CITRATE 50 UG/ML
25 INJECTION, SOLUTION INTRAMUSCULAR; INTRAVENOUS EVERY 5 MIN PRN
Status: DISCONTINUED | OUTPATIENT
Start: 2018-06-21 | End: 2018-06-21 | Stop reason: HOSPADM

## 2018-06-21 RX ORDER — ONDANSETRON 2 MG/ML
4 INJECTION INTRAMUSCULAR; INTRAVENOUS DAILY PRN
Status: DISCONTINUED | OUTPATIENT
Start: 2018-06-21 | End: 2018-06-21 | Stop reason: HOSPADM

## 2018-06-21 RX ORDER — HYDROCODONE BITARTRATE AND ACETAMINOPHEN 5; 325 MG/1; MG/1
1 TABLET ORAL EVERY 4 HOURS PRN
Status: DISCONTINUED | OUTPATIENT
Start: 2018-06-21 | End: 2018-06-21 | Stop reason: HOSPADM

## 2018-06-21 RX ORDER — FENTANYL CITRATE 50 UG/ML
INJECTION, SOLUTION INTRAMUSCULAR; INTRAVENOUS
Status: DISCONTINUED | OUTPATIENT
Start: 2018-06-21 | End: 2018-06-21

## 2018-06-21 RX ORDER — ONDANSETRON 2 MG/ML
4 INJECTION INTRAMUSCULAR; INTRAVENOUS EVERY 12 HOURS PRN
Status: DISCONTINUED | OUTPATIENT
Start: 2018-06-21 | End: 2018-06-21 | Stop reason: HOSPADM

## 2018-06-21 RX ORDER — MIDAZOLAM HYDROCHLORIDE 1 MG/ML
INJECTION INTRAMUSCULAR; INTRAVENOUS
Status: DISCONTINUED | OUTPATIENT
Start: 2018-06-21 | End: 2018-06-21

## 2018-06-21 RX ORDER — LIDOCAINE HCL/PF 100 MG/5ML
SYRINGE (ML) INTRAVENOUS
Status: DISCONTINUED | OUTPATIENT
Start: 2018-06-21 | End: 2018-06-21

## 2018-06-21 RX ORDER — HYDROMORPHONE HYDROCHLORIDE 2 MG/ML
1 INJECTION, SOLUTION INTRAMUSCULAR; INTRAVENOUS; SUBCUTANEOUS EVERY 4 HOURS PRN
Status: DISCONTINUED | OUTPATIENT
Start: 2018-06-21 | End: 2018-06-21 | Stop reason: HOSPADM

## 2018-06-21 RX ORDER — ONDANSETRON 2 MG/ML
INJECTION INTRAMUSCULAR; INTRAVENOUS
Status: DISCONTINUED | OUTPATIENT
Start: 2018-06-21 | End: 2018-06-21

## 2018-06-21 RX ORDER — DEXAMETHASONE SODIUM PHOSPHATE 4 MG/ML
INJECTION, SOLUTION INTRA-ARTICULAR; INTRALESIONAL; INTRAMUSCULAR; INTRAVENOUS; SOFT TISSUE
Status: DISCONTINUED | OUTPATIENT
Start: 2018-06-21 | End: 2018-06-21

## 2018-06-21 RX ORDER — PROPOFOL 10 MG/ML
INJECTION, EMULSION INTRAVENOUS
Status: DISCONTINUED | OUTPATIENT
Start: 2018-06-21 | End: 2018-06-21

## 2018-06-21 RX ORDER — SODIUM CHLORIDE 0.9 % (FLUSH) 0.9 %
3 SYRINGE (ML) INJECTION
Status: DISCONTINUED | OUTPATIENT
Start: 2018-06-21 | End: 2018-06-21 | Stop reason: HOSPADM

## 2018-06-21 RX ADMIN — OXYCODONE HYDROCHLORIDE 5 MG: 5 TABLET ORAL at 04:06

## 2018-06-21 RX ADMIN — ACETAMINOPHEN 1000 MG: 10 INJECTION, SOLUTION INTRAVENOUS at 03:06

## 2018-06-21 RX ADMIN — PROPOFOL 300 MG: 10 INJECTION, EMULSION INTRAVENOUS at 03:06

## 2018-06-21 RX ADMIN — PHENYLEPHRINE HYDROCHLORIDE 100 MCG: 10 INJECTION INTRAVENOUS at 03:06

## 2018-06-21 RX ADMIN — CARBOXYMETHYLCELLULOSE SODIUM 2 DROP: 2.5 SOLUTION/ DROPS OPHTHALMIC at 03:06

## 2018-06-21 RX ADMIN — HYDROMORPHONE HYDROCHLORIDE 0.4 MG: 2 INJECTION INTRAMUSCULAR; INTRAVENOUS; SUBCUTANEOUS at 04:06

## 2018-06-21 RX ADMIN — VANCOMYCIN HYDROCHLORIDE 1500 MG: 1 INJECTION, POWDER, LYOPHILIZED, FOR SOLUTION INTRAVENOUS at 01:06

## 2018-06-21 RX ADMIN — FENTANYL CITRATE 100 MCG: 50 INJECTION, SOLUTION INTRAMUSCULAR; INTRAVENOUS at 03:06

## 2018-06-21 RX ADMIN — PHENYLEPHRINE HYDROCHLORIDE 200 MCG: 10 INJECTION INTRAVENOUS at 03:06

## 2018-06-21 RX ADMIN — SODIUM CHLORIDE, SODIUM LACTATE, POTASSIUM CHLORIDE, AND CALCIUM CHLORIDE: 600; 310; 30; 20 INJECTION, SOLUTION INTRAVENOUS at 02:06

## 2018-06-21 RX ADMIN — LIDOCAINE HYDROCHLORIDE 20 MG: 20 INJECTION, SOLUTION INTRAVENOUS at 03:06

## 2018-06-21 RX ADMIN — PROPOFOL 50 MG: 10 INJECTION, EMULSION INTRAVENOUS at 03:06

## 2018-06-21 RX ADMIN — DEXAMETHASONE SODIUM PHOSPHATE 8 MG: 4 INJECTION, SOLUTION INTRAMUSCULAR; INTRAVENOUS at 03:06

## 2018-06-21 RX ADMIN — SODIUM CHLORIDE, SODIUM LACTATE, POTASSIUM CHLORIDE, AND CALCIUM CHLORIDE: 600; 310; 30; 20 INJECTION, SOLUTION INTRAVENOUS at 03:06

## 2018-06-21 RX ADMIN — MIDAZOLAM HYDROCHLORIDE 2 MG: 1 INJECTION, SOLUTION INTRAMUSCULAR; INTRAVENOUS at 03:06

## 2018-06-21 RX ADMIN — ONDANSETRON 4 MG: 2 INJECTION INTRAMUSCULAR; INTRAVENOUS at 03:06

## 2018-06-21 NOTE — TRANSFER OF CARE
"Anesthesia Transfer of Care Note    Patient: Maciel Contreras    Procedure(s) Performed: Procedure(s) (LRB):  Repair of scrotal laceration (N/A)    Patient location: PACU    Anesthesia Type: general    Transport from OR: Transported from OR on 2-3 L/min O2 by NC with adequate spontaneous ventilation    Post pain: adequate analgesia    Post assessment: no apparent anesthetic complications    Post vital signs: stable    Level of consciousness: awake    Nausea/Vomiting: no nausea/vomiting    Complications: none    Transfer of care protocol was followed      Last vitals:   Visit Vitals  /80 (BP Location: Left arm, Patient Position: Sitting)   Pulse 67   Temp 36.9 °C (98.4 °F) (Oral)   Resp 16   Ht 6' 1" (1.854 m)   Wt 86.2 kg (190 lb)   SpO2 99%   BMI 25.07 kg/m²     "

## 2018-06-21 NOTE — BRIEF OP NOTE
[facility]  Brief Operative Note     SUMMARY     Surgery Date: 6/21/2018     Surgeon(s) and Role:     * Juan Adams MD - Primary    Assisting Surgeon: None    Pre-op Diagnosis:  Laceration of scrotum, subsequent encounter [S31.31XD]    Post-op Diagnosis:  Post-Op Diagnosis Codes:     * Laceration of scrotum, subsequent encounter [S31.31XD]    Procedure(s) (LRB):  Repair of scrotal laceration (N/A)    Anesthesia: General    Description of the findings of the procedure: Healthy skin edges with some early granulation noted. Incision closed in 3 layers with 3-0 Vicryl and 2-0 Nylon.     Estimated Blood Loss: 0cc         Specimens:   Specimen (12h ago through future)    None          Discharge Note    SUMMARY     Admit Date: 6/21/2018    Discharge Date and Time: 6/21/2018    Hospital Course: Patient was admitted for elective outpatient procedure, which was uncomplicated and well tolerated.      Final Diagnosis: Post-Op Diagnosis Codes:     * Laceration of scrotum, subsequent encounter [S31.31XD]    Disposition: Home or Self Care    Follow Up/Patient Instructions:     Medications:  Reconciled Home Medications: Current Discharge Medication List      CONTINUE these medications which have NOT CHANGED    Details   atenolol-chlorthalidone (TENORETIC) 100-25 mg per tablet Take 1 tablet by mouth once daily.  Qty: 90 tablet, Refills: 3      carisoprodol (SOMA) 350 MG tablet Take 1 tablet (350 mg total) by mouth 3 (three) times daily as needed.  Qty: 90 tablet, Refills: 0      ciprofloxacin HCl (CIPRO) 500 MG tablet TAKE 1 TABLET (500 MG TOTAL) BY MOUTH 2 (TWO) TIMES DAILY.  Qty: 20 tablet, Refills: 2      docusate sodium (COLACE) 100 MG capsule Take 1 capsule (100 mg total) by mouth 2 (two) times daily as needed for Constipation.  Qty: 30 capsule, Refills: 0      lactulose (CHRONULAC) 20 gram/30 mL Soln 1-2 tablespoons daily for constipation  Qty: 900 mL, Refills: 3      LORazepam (ATIVAN) 2 MG Tab TAKE 1 TABLET  (2 MG TOTAL) BY MOUTH 2 (TWO) TIMES DAILY.  Qty: 60 tablet, Refills: 1    Comments: Not to exceed 5 additional fills before 09/09/2017      mometasone (NASONEX) 50 mcg/actuation nasal spray 1 spray by Nasal route once daily.  Qty: 17 g, Refills: 12      oxycodone-acetaminophen (PERCOCET)  mg per tablet Take 1 tablet by mouth 3 (three) times daily as needed.  Refills: 0      potassium chloride SA (K-DUR,KLOR-CON) 20 MEQ tablet Take 1 tablet (20 mEq total) by mouth once daily.  Qty: 90 tablet, Refills: 3      sulfamethoxazole-trimethoprim 800-160mg (BACTRIM DS) 800-160 mg Tab Take 1 tablet by mouth 2 (two) times daily.  Qty: 20 tablet, Refills: 0    Associated Diagnoses: Laceration of scrotum, subsequent encounter      temazepam (RESTORIL) 30 mg capsule TAKE 1 CAPSULE (30 MG TOTAL) BY MOUTH NIGHTLY AS NEEDED FOR INSOMNIA.  Qty: 30 capsule, Refills: 1    Comments: Not to exceed 4 additional fills before 03/06/2018      tramadol (ULTRAM) 50 mg tablet TAKE 1 TO 2 TABLETS BY MOUTH THREE TIMES DAILY AS NEEDED FOR PAIN  Qty: 180 tablet, Refills: 1      zolpidem (AMBIEN) 10 mg Tab TAKE 1 TABLET (10 MG TOTAL) BY MOUTH NIGHTLY AS NEEDED.  Qty: 30 tablet, Refills: 2    Comments: Not to exceed 5 additional fills before 02/26/2017.      amoxicillin-clavulanate 875-125mg (AUGMENTIN) 875-125 mg per tablet Take 1 tablet by mouth 2 (two) times daily.  Qty: 20 tablet, Refills: 1      clobetasol (TEMOVATE) 0.05 % cream Apply topically 2 (two) times daily.  Qty: 30 g, Refills: 1      cyclobenzaprine (FLEXERIL) 10 MG tablet Take 1 tablet by mouth 3 (three) times daily.  Refills: 3      gabapentin (NEURONTIN) 300 MG capsule Take 1 capsule (300 mg total) by mouth 3 (three) times daily. For left arm pain.  Qty: 90 capsule, Refills: 3      meloxicam (MOBIC) 15 MG tablet TAKE 1 TABLET (15 MG TOTAL) BY MOUTH DAILY AS NEEDED FOR PAIN.  Qty: 30 tablet, Refills: 1      vardenafil (LEVITRA) 20 MG tablet Take 1 tablet (20 mg total) by mouth  daily as needed for Erectile Dysfunction.  Qty: 6 tablet, Refills: 3             Discharge Procedure Orders  Diet general     Activity as tolerated       Follow-up Information     Stephanie Bennett NP. Schedule an appointment as soon as possible for a visit in 1 week.    Specialty:  Urology  Why:  For post-operative follow-up  Contact information:  [address] [facility] [address] [phone]

## 2018-06-21 NOTE — ANESTHESIA POSTPROCEDURE EVALUATION
"Anesthesia Post Evaluation    Patient: Maciel Contreras    Procedure(s) Performed: Procedure(s) (LRB):  Repair of scrotal laceration (N/A)    Final Anesthesia Type: general  Patient location during evaluation: PACU  Patient participation: Yes- Able to Participate  Level of consciousness: awake and alert  Post-procedure vital signs: reviewed and stable  Pain management: adequate  Airway patency: patent  PONV status at discharge: No PONV  Anesthetic complications: no      Cardiovascular status: blood pressure returned to baseline  Respiratory status: unassisted and spontaneous ventilation  Hydration status: euvolemic  Follow-up not needed.        Visit Vitals  BP (!) 152/79 (BP Location: Left arm, Patient Position: Lying)   Pulse 69   Temp 36.7 °C (98 °F) (Oral)   Resp 16   Ht 6' 1" (1.854 m)   Wt 86.2 kg (190 lb)   SpO2 98%   BMI 25.07 kg/m²       Pain/Eduar Score: Pain Assessment Performed: Yes (6/21/2018  5:25 PM)  Presence of Pain: complains of pain/discomfort (6/21/2018  5:25 PM)  Pain Rating Prior to Med Admin: 6 (6/21/2018  4:58 PM)  Pain Rating Post Med Admin: 2 (6/21/2018  5:01 PM)  Eduar Score: 9 (6/21/2018  5:25 PM)      "

## 2018-06-21 NOTE — OR NURSING
Spoke with Dr Drew for further patient instructions.  He stated that patient can remove fluff dressing at his own discretion; may shower in two days.  Patient advised of these instructions.

## 2018-06-21 NOTE — DISCHARGE INSTRUCTIONS
Anesthesia: After Your Surgery  Youve just had surgery. During surgery, you received medication called anesthesia to keep you comfortable and pain-free. After surgery, you may experience some pain or nausea. This is common. Here are some tips for feeling better and recovering after surgery.    Going home  Your doctor or nurse will show you how to take care of yourself when you go home. He or she will also answer your questions. Have an adult family member or friend drive you home. For the first 24 hours after your surgery:  · Do not drive or use heavy equipment.  · Do not make important decisions or sign legal documents.  · Avoid alcohol.  · Have someone stay with you, if needed. He or she can watch for problems and help keep you safe.  Be sure to keep all follow-up appointments with your doctor. And rest after your procedure for as long as your doctor tells you to.    Coping with pain  If you have pain after surgery, pain medication will help you feel better. Take it as directed, before pain becomes severe. Also, ask your doctor or pharmacist about other ways to control pain, such as with heat, ice, and relaxation. And follow any other instructions your surgeon or nurse gives you.    Tips for taking pain medication  To get the best relief possible, remember these points:  · Pain medications can upset your stomach. Taking them with a little food may help.  · Most pain relievers taken by mouth need at least 20 to 30 minutes to take effect.  · Taking medication on a schedule can help you remember to take it. Try to time your medication so that you can take it before beginning an activity, such as dressing, walking, or sitting down for dinner.  · Constipation is a common side effect of pain medications. Contact your doctor before taking any medications like laxatives or stool softeners to help relieve constipation. Also ask about any dietary restrictions, because drinking lots of fluids and eating foods like fruits  and vegetables that are high in fiber can also help. Remember, dont take laxatives unless your surgeon has prescribed them.  · Mixing alcohol and pain medication can cause dizziness and slow your breathing. It can even be fatal. Dont drink alcohol while taking pain medication.  · Pain medication can slow your reflexes. Dont drive or operate machinery while taking pain medication.  If your health care provider tells you to take acetaminophen to help relieve your pain, ask him or her how much you are supposed to take each day. (Acetaminophen is the generic name for Tylenol and other brand-name pain relievers.) Acetaminophen or other pain relievers may interact with your prescription medicines or other over-the-counter (OTC) drugs. Some prescription medications contain acetaminophen along with other active ingredients. Using both prescription and OTC acetaminophen for pain can cause you to overdose. The FDA recommends that you read the labels on your OTC medications carefully. This will help you to clearly understand the list of active ingredients, dosing instructions, and any warnings. It may also help you avoid taking too much acetaminophen. If you have questions or don't understand the information, ask your pharmacist or health care provider to explain it to you before you take the OTC medication.    Managing nausea  Some people have an upset stomach after surgery. This is often due to anesthesia, pain, pain medications, or the stress of surgery. The following tips will help you manage nausea and get good nutrition as you recover. If you were on a special diet before surgery, ask your doctor if you should follow it during recovery. These tips may help:  · Dont push yourself to eat. Your body will tell you when to eat and how much.  · Start off with clear liquids and soup. They are easier to digest.  · Progress to semi-solid foods (mashed potatoes, applesauce, and gelatin) as you feel ready.  · Slowly move to  solid foods. Dont eat fatty, rich, or spicy foods at first.  · Dont force yourself to have three large meals a day. Instead, eat smaller amounts more often.  · Take pain medications with a small amount of solid food, such as crackers or toast to avoid nausea.      Call your surgeon if  · You still have pain an hour after taking medication (it may not be strong enough).  · You feel too sleepy, dizzy, or groggy (medication may be too strong).  · You have side effects like nausea, vomiting, or skin changes (rash, itching, or hives).   © 0141-1064 [employer]. [address] [place] [address], [place] [address]. All rights reserved. This information is not intended as a substitute for professional medical care. Always follow your healthcare professional's instructions.

## 2018-06-21 NOTE — PLAN OF CARE
Maciel Contreras has met all discharge criteria from Phase II. Vital Signs are stable, ambulating  without difficulty.Pain is now under control and tolerable for the pt. Pain score 2 at this time.  Discharge instructions given, patient verbalized understanding. Discharged from facility via wheelchair in stable condition.

## 2018-06-21 NOTE — ANESTHESIA PREPROCEDURE EVALUATION
06/21/2018  Maciel Contreras is a 62 y.o., male.    Pre-op Assessment    I have reviewed the Patient Summary Reports.     I have reviewed the Nursing Notes.   I have reviewed the Medications.     Review of Systems  Anesthesia Hx:  History of prior surgery of interest to airway management or planning: Denies Family Hx of Anesthesia complications.   Denies Personal Hx of Anesthesia complications.   Social:  Smoker    Hematology/Oncology:     Oncology Normal     Cardiovascular:   Hypertension    Pulmonary:  Pulmonary Normal    Renal/:   Chronic Renal Disease (single kidney s/s nephrectomy)    Hepatic/GI:   GERD    Neurological:   T-10-T-11 spinal paralysis   Psych:   anxiety          Physical Exam  General:  Well nourished    Airway/Jaw/Neck:  Airway Findings: Mouth Opening: Normal Tongue: Normal  General Airway Assessment: Adult  Mallampati: II  TM Distance: Normal, at least 6 cm      Dental:  Dental Findings: In tact, upper partial dentures        Mental Status:  Mental Status Findings:  Alert and Oriented, Cooperative         Anesthesia Plan  Type of Anesthesia, risks & benefits discussed:  Anesthesia Type:  general  Patient's Preference:   Intra-op Monitoring Plan: standard ASA monitors  Intra-op Monitoring Plan Comments:   Post Op Pain Control Plan: multimodal analgesia and per primary service following discharge from PACU  Post Op Pain Control Plan Comments:   Induction:   IV  Beta Blocker:         Informed Consent: Patient understands risks and agrees with Anesthesia plan.  Questions answered. Anesthesia consent signed with patient.  ASA Score: 3     Day of Surgery Review of History & Physical:    H&P update referred to the surgeon.         Ready For Surgery From Anesthesia Perspective.

## 2018-06-21 NOTE — INTERVAL H&P NOTE
The patient has been examined and the H&P has been reviewed:    I concur with the findings and no changes have occurred since H&P was written.    Anesthesia/Surgery risks, benefits and alternative options discussed and understood by patient/family.          Active Hospital Problems    Diagnosis  POA    Scrotal laceration [S31.31XA]  Yes      Resolved Hospital Problems    Diagnosis Date Resolved POA   No resolved problems to display.

## 2018-06-22 ENCOUNTER — TELEPHONE (OUTPATIENT)
Dept: UROLOGY | Facility: CLINIC | Age: 63
End: 2018-06-22

## 2018-06-22 DIAGNOSIS — S31.31XD LACERATION OF SCROTUM, SUBSEQUENT ENCOUNTER: ICD-10-CM

## 2018-06-22 NOTE — TELEPHONE ENCOUNTER
I spoke to pt.  He has questions that I could not answer.  He said you and spoke about him staying in bed until next Friday, but nurses told him he could move freely, show, etc.  He stated he does not want to go through this again and asked that you call him for clarification. Thanks

## 2018-06-22 NOTE — TELEPHONE ENCOUNTER
----- Message from Deisy Aguayo sent at 6/22/2018 12:23 PM CDT -----  Contact: SUSANA BURNETT [[id number]]            Name of Who is Calling:SUSANA BURNETT [[id number]]       What is the request in detail: Patient would like to discuss some things with the nurse. Please call him.       Can the clinic reply by [facility]: no      What Number to Call Back if not in [facility]: [phone]

## 2018-06-22 NOTE — TELEPHONE ENCOUNTER
I told him and granddaughter to stay in bed until he comes for follow-up next week. He should stick with that plan.

## 2018-06-22 NOTE — OP NOTE
DATE OF PROCEDURE:  06/21/2018    PREOPERATIVE DIAGNOSIS:  Scrotal laceration.    POSTOPERATIVE DIAGNOSIS:  Scrotal laceration.    OPERATIVE PROCEDURE:  Three-layer closure of scrotal laceration.    SURGEON:  Juan Adams M.D.    ANESTHESIA:  General.    COMPLICATIONS:  None.    ESTIMATED BLOOD LOSS:  Zero.    SPECIMEN:  None.    INDICATIONS FOR PROCEDURE:  Mr. Conterras is a 62-year-old paraplegic male who   suffered a laceration to the left side of his hemiscrotum approximately 2 weeks   ago.  He initially presented to the Emergency Room and he underwent exploration   and closure of the incision in the Operating Room by Dr. Streeter.  The following   week, the patient returned to the office and indicated that the incision was   open again.  I saw him at that time and confirmed that he still had an open   wound on his scrotum.  He elected to proceed with repeat closure in the office.    This was done with interrupted sutures.  He returned yesterday and again had an   open wound.  He was counseled to undergo a repeat closure with possible   debridement in the Operating Room.  He wished to proceed.  He presents today to   have this done.    PROCEDURE DESCRIPTION:  The patient was brought to the OR and placed under   general anesthesia.  He was placed in supine position.  He was prepped and   draped in sterile fashion.  Timeout was performed.    The wound was carefully inspected.  There remained a 9cm lateration in the left hemiscrotum. The previous sutures remained in place on the superior edge of the laceration, which were all removed. There was considerably more healing with healthy-appearing tissue than when I saw him one week prior.  There was some granulation tissue noted in the bed of the wound.  There previously had been a   flap of skin on the inferior aspect of the laceration that I believe was possibly necrotic.  It appeared that this tissue had sloughed off.  There was no longer a flap of skin on the  inferior aspect of the laceration.  The skin edges were inspected and there was noted to have good bleeding edges with slight abrasion, therefore I did not feel that debridement was necessary.  There was no evidence of infection or active necrosis.    I then proceeded with closure.  I did initial layer using 3-0 Vicryl sutures.    This was done primarily in the underlying dartos fascia and granulation tissue   in order to plicate this surface and bring the skin edges closer together.  This   was done using multiple interrupted sutures.  A second layer of closure was   then performed again using 3-0 Vicryl sutures.  Again, this was done in an   interrupted fashion.  This was used to bring the deep aspect of the skin edges   with an approximation.  Care was taken throughout to ensure healthy bites of   tissue and minimal tension on the sutures.  Once the skin edges were   reapproximated, I then proceeded with a third layer.  This was done with 2-0   nylon sutures.  Again, this was done in an interrupted fashion.  At this point,   the skin edges were all nicely reapproximated.  The incision was cleaned.  He   was then covered with Dermabond.  He was awoken and transferred to PACU in   stable condition.      JOSE M/DREAD  dd: 06/21/2018 16:08:29 (CDT)  td: 06/21/2018 19:59:26 (CDT)  Doc ID   #[id number]  Job ID #[id number]    CC:

## 2018-06-25 ENCOUNTER — TELEPHONE (OUTPATIENT)
Dept: UROLOGY | Facility: CLINIC | Age: 63
End: 2018-06-25

## 2018-06-25 RX ORDER — SULFAMETHOXAZOLE AND TRIMETHOPRIM 800; 160 MG/1; MG/1
TABLET ORAL
Qty: 20 TABLET | Refills: 0 | Status: SHIPPED | OUTPATIENT
Start: 2018-06-25 | End: 2018-07-09

## 2018-06-25 NOTE — TELEPHONE ENCOUNTER
----- Message from Heriberto Powell sent at 6/22/2018  4:30 PM CDT -----  Contact: SUSANA BURNETT [[id number]]      Can the clinic reply in [facility]: no    Please refill the medication(s) listed below. Please call the patient when the prescription(s) is ready for  at this phone number   [phone]       Medication #1 sulfamethoxazole-trimethoprim 800-160mg (BACTRIM DS) 800-160 mg Tab    Preferred Pharmacy: [facility] #[id number] - [place] - [address]

## 2018-06-29 ENCOUNTER — TELEPHONE (OUTPATIENT)
Dept: WOUND CARE | Facility: CLINIC | Age: 63
End: 2018-06-29

## 2018-06-29 ENCOUNTER — OFFICE VISIT (OUTPATIENT)
Dept: UROLOGY | Facility: CLINIC | Age: 63
End: 2018-06-29
Payer: MEDICARE

## 2018-06-29 VITALS
WEIGHT: 190 LBS | HEART RATE: 77 BPM | HEIGHT: 73 IN | SYSTOLIC BLOOD PRESSURE: 143 MMHG | BODY MASS INDEX: 25.18 KG/M2 | DIASTOLIC BLOOD PRESSURE: 79 MMHG

## 2018-06-29 DIAGNOSIS — S31.31XD LACERATION OF SCROTUM, SUBSEQUENT ENCOUNTER: Primary | ICD-10-CM

## 2018-06-29 PROCEDURE — 99213 OFFICE O/P EST LOW 20 MIN: CPT | Mod: 25,S$GLB,, | Performed by: NURSE PRACTITIONER

## 2018-06-29 NOTE — TELEPHONE ENCOUNTER
----- Message from Mirna Guzman NP sent at 6/29/2018  2:11 PM CDT -----  Contact: Manjit from Dr. Timur Caldwell office can be reached [phone]  I am sending this to my nurse to schedule.    Thanks!  ----- Message -----  From: Luz Maria De La Cruz  Sent: 6/29/2018   1:36 PM  To: [facility] Wound Clinical Support    Called to schedule an appt for wound in inner thigh.        Thank you!

## 2018-06-29 NOTE — PROGRESS NOTES
"Subjective:      Maciel Contreras is a 62 y.o. male who presents today for evaluation of his recent scrotal laceration.        He underwent penile prosthesis in the 1980s requiring revision due to infection and left hydrocelectomy.         He presented to the ED on 6/8 with scrotal laceration which occurred while transferring from his wheelchair. He is a paraplegic. Dr. Streeter repaired the laceration in the OR on 6/8. He presented to the office on 6/13 for wound recheck and reported that the "sutures fell out along with a large clot." Dr. Adams re-approximated the edges with nylon 2-0 sutures at follow up visit on 6/13. Antibiotics were adjusted from macrobid to bactrim. He is now 1 week s/p a 3 layer closure of his scrotal laceration, which was done in the OR by Dr. Adams on 6/25.     Meng remains in place draining clear, danie urine. He denies pain at the site. Denies fever/chills.     The following portions of the patient's history were reviewed and updated as appropriate: allergies, current medications, past family history, past medical history, past social history, past surgical history and problem list.    Review of Systems  Constitutional: no fever or chills  ENT: no nasal congestion or sore throat  Respiratory: no cough or shortness of breath  Cardiovascular: no chest pain or palpitations  Gastrointestinal: no nausea or vomiting, tolerating diet  Genitourinary: as per HPI  Hematologic/Lymphatic: no easy bruising or lymphadenopathy  Musculoskeletal: paraplegic, wheelchair bound   Neurological: no seizures or tremors  Behavioral/Psych: no auditory or visual hallucinations     Objective:   Vitals:  Vitals:    06/29/18 1434   BP: (!) 143/79   Pulse: 77     Physical Exam   General: alert and oriented, no acute distress  Head: normocephalic, atraumatic  Neck: supple, no lymphadenopathy, normal ROM, no masses  Respiratory: Symmetric expansion, non-labored breathing  Cardiovascular: regular rate and rhythm, nomal " pulses, no peripheral edema  Abdomen: soft, non tender, non distended, no palpable masses, no hernias, no hepatomegaly or splenomegaly  Genitourinary: incision healing well with sutures/dermabond in place. No redness, swelling or drainage noted   Lymphatic: no inguinal nodes  Skin: laceration noted to scrotum   Neuro: paraplegic, WC bound   Psych: normal judgment and insight, normal mood/affect and non-anxious    Lab Review   Urinalysis demonstrates: no sample   Lab Results   Component Value Date    WBC 12.58 11/25/2015    HGB 15.1 11/25/2015    HCT 43.4 11/25/2015    MCV 88 11/25/2015     11/25/2015     Lab Results   Component Value Date    CREATININE 1.0 07/08/2016    BUN 13 07/08/2016     Lab Results   Component Value Date    PSA 0.82 02/25/2013     Imaging   None    Assessment:   Laceration of scrotum, subsequent encounter    Plan:   Maciel was seen today for follow-up.    Diagnoses and all orders for this visit:    Laceration of scrotum, subsequent encounter    Plan:  --Healing well  --Continue to limit activity and provide scrotal support during transfers  --Follow up on 6/9 for wound recheck and suture/garcia removal

## 2018-07-09 ENCOUNTER — OFFICE VISIT (OUTPATIENT)
Dept: UROLOGY | Facility: CLINIC | Age: 63
End: 2018-07-09
Attending: UROLOGY
Payer: MEDICARE

## 2018-07-09 VITALS
WEIGHT: 190 LBS | BODY MASS INDEX: 25.18 KG/M2 | HEART RATE: 63 BPM | HEIGHT: 73 IN | SYSTOLIC BLOOD PRESSURE: 135 MMHG | DIASTOLIC BLOOD PRESSURE: 78 MMHG

## 2018-07-09 DIAGNOSIS — S31.31XD LACERATION OF SCROTUM, SUBSEQUENT ENCOUNTER: Primary | ICD-10-CM

## 2018-07-09 PROCEDURE — 99213 OFFICE O/P EST LOW 20 MIN: CPT | Mod: S$GLB,,, | Performed by: UROLOGY

## 2018-07-09 NOTE — PROGRESS NOTES
"Subjective:      Maciel Contreras is a 63 y.o. male who returns today regarding his scrotal laceration.    S/p closure of scrotal laceration x 3, last in layers by me on 6/21/18. Incision has healed well in interim and he is here today for suture removal.     Garcia remains in place (for wound healing).    The following portions of the patient's history were reviewed and updated as appropriate: allergies, current medications, past family history, past medical history, past social history, past surgical history and problem list.    Review of Systems  A comprehensive multipoint review of systems was negative except as otherwise stated in the HPI.     Objective:   Vitals: /78 (BP Location: Left arm, Patient Position: Sitting, BP Method: Large (Automatic))   Pulse 63   Ht 6' 1" (1.854 m)   Wt 86.2 kg (190 lb)   BMI 25.07 kg/m²     Physical Exam   General: alert and oriented, no acute distress  Respiratory: Symmetric expansion, non-labored breathing  Genitourinary: healing well approximated scrotal incision with nylon sutures present; Garcia in place with clear yellow urine   Skin: normal coloration and turgor, no rashes, no suspicious skin lesions noted  Neuro: no gross deficits  Psych: normal judgment and insight, normal mood/affect and non-anxious    Lab Review     Lab Results   Component Value Date    WBC 12.58 11/25/2015    HGB 15.1 11/25/2015    HCT 43.4 11/25/2015    MCV 88 11/25/2015     11/25/2015     Lab Results   Component Value Date    CREATININE 1.0 07/08/2016    BUN 13 07/08/2016     Lab Results   Component Value Date    PSA 0.82 02/25/2013       Assessment and Plan:   1. Laceration of scrotum, subsequent encounter  -- Sutures and garcia removed today  -- FU 1 mos     "

## 2018-07-25 ENCOUNTER — TELEPHONE (OUTPATIENT)
Dept: WOUND CARE | Facility: CLINIC | Age: 63
End: 2018-07-25

## 2018-08-06 ENCOUNTER — OFFICE VISIT (OUTPATIENT)
Dept: WOUND CARE | Facility: CLINIC | Age: 63
End: 2018-08-06
Payer: MEDICARE

## 2018-08-06 VITALS
DIASTOLIC BLOOD PRESSURE: 77 MMHG | SYSTOLIC BLOOD PRESSURE: 134 MMHG | BODY MASS INDEX: 26.08 KG/M2 | HEART RATE: 79 BPM | WEIGHT: 196.81 LBS | HEIGHT: 73 IN | TEMPERATURE: 98 F

## 2018-08-06 DIAGNOSIS — G82.20 PARAPLEGIA: Primary | ICD-10-CM

## 2018-08-06 DIAGNOSIS — L89.314 PRESSURE ULCER OF ISCHIUM, RIGHT, STAGE IV: ICD-10-CM

## 2018-08-06 PROCEDURE — 99213 OFFICE O/P EST LOW 20 MIN: CPT | Mod: PBBFAC | Performed by: NURSE PRACTITIONER

## 2018-08-06 PROCEDURE — 99999 PR PBB SHADOW E&M-EST. PATIENT-LVL III: CPT | Mod: PBBFAC,,, | Performed by: NURSE PRACTITIONER

## 2018-08-06 PROCEDURE — 99212 OFFICE O/P EST SF 10 MIN: CPT | Mod: S$PBB,,, | Performed by: NURSE PRACTITIONER

## 2018-08-06 NOTE — PROGRESS NOTES
Subjective:       Patient ID: Maciel Contreras is a 63 y.o. male.    Chief Complaint: Wound Check    Wound Check       This patient is seen today for reevaluation of a recurrent ulcer stage IV pressure ulcer to the right ischium.  His medical history is significant for paraplegia.  Last clinic visit the wound was epithelialized. However, due to rotator cuff issues and pain in the wrist, when transferring, he is exposed to constant friction and shearing of the area. He is here for evaluation of wounds. The wound is shallow but has a history of astage IV ulcer to the same area.      Review of Systems  Unchanged from prior visit.  Objective:      Physical Exam   Constitutional: He is oriented to person, place, and time. He appears well-developed and well-nourished. No distress.   HENT:   Head: Normocephalic and atraumatic.   Musculoskeletal: He exhibits no edema or tenderness.        Legs:  Patient is paraplegic and unable to voluntarily move lower extremities.   Neurological: He is alert and oriented to person, place, and time.   Skin: Skin is warm and dry. No rash noted. He is not diaphoretic. No cyanosis or erythema. Nails show no clubbing.   Psychiatric: He has a normal mood and affect. His behavior is normal. Judgment and thought content normal.   Nursing note and vitals reviewed.          Wound cleansed with wound cleanser, Endoform was placed on the wound bed, a mepilex border dressing was applied to the area.   Assessment:       1. Paraplegia    2. Pressure ulcer of ischium, right, stage IV        Plan:        Will contact PCP to request resumption of care by home health agency.   Keep pressure off of bony prominences at all times.  Pressure redistribution cushion at all times.  Limit seated position for two hour intervals.    Home Health orders  Cleanse wound with wound .  Moisturizer to right leg and foot.  Endoform or similar collagen dressing to site.  Cover with mepilex foam border dressing three  times weekly.  [facility] to assist with dressing changes three times weekly.   Return to clinic in 3 weeks

## 2018-08-06 NOTE — PATIENT INSTRUCTIONS
Keep pressure off of bony prominences at all times.  Pressure redistribution cushion at all times.  Limit seated position for two hour intervals.  Cleanse wound with wound .  Moisturizer to right leg and foot.  Endoform or similar collagen dressing to site.  Cover with mepilex foam border dressing three times weekly.  [facility] to assist with dressing changes three times weekly.   Return to clinic in 3 weeks    How Pressure Ulcers Form  Even a healthy person can begin to develop pressure ulcers in less than a day if left in one position. It helps to know where how pressure ulcers develop and what you can do to prevent them.    Unrelieved Pressure  People who cant move for long periods of time are at risk for pressure ulcers because oxygen and other nutrients cant easily reach their cells. An immobile person with skin made fragile by age or disease is at even greater risk. This skin heals more slowly.    Shear and Friction  Two forces contribute to pressure ulcers. Opposite, but parallel, sliding motions (shear)--like bone moving down and skin up--compress blood vessels. Surfaces rubbing (friction) can also cause skin to break down.  Moisture Buildup  Skin overfilled with fluid is fragile. And certain kinds of fluid--sweat, urine, or feces--can provide a medium for bacteria growth, delaying healing. Too much moisture carries another risk: it can increase friction.  Poor Nutrition  Cells reproduce slowly if they are poorly nourished. They reproduce even more slowly in a person with an illness or tissue injury. Loss of body weight from poor nutrition makes tissue thin and fragile.  © 4878-8065 [employer] [facility], [address] [place] [address], [place] [address]. All rights reserved. This information is not intended as a substitute for professional medical care. Always follow your healthcare professional's instructions.

## 2018-08-06 NOTE — Clinical Note
Please resume home health services for wound care 3x/week. Patient has [facility]. Orders for care include:  Cleanse wound with wound  or normal saline Endoform or similar collagen dressing to site. Cover with mepilex foam border dressing three times weekly.  Return to clinic in 3 weeks Keep pressure off of bony prominences at all times. Pressure redistribution cushion at all times. Limit seated position for two hour intervals.

## 2018-08-13 ENCOUNTER — OFFICE VISIT (OUTPATIENT)
Dept: UROLOGY | Facility: CLINIC | Age: 63
End: 2018-08-13
Attending: UROLOGY
Payer: MEDICARE

## 2018-08-13 VITALS
WEIGHT: 196.88 LBS | HEART RATE: 92 BPM | HEIGHT: 73 IN | SYSTOLIC BLOOD PRESSURE: 140 MMHG | BODY MASS INDEX: 26.09 KG/M2 | DIASTOLIC BLOOD PRESSURE: 84 MMHG

## 2018-08-13 DIAGNOSIS — N40.1 BENIGN NON-NODULAR PROSTATIC HYPERPLASIA WITH LOWER URINARY TRACT SYMPTOMS: ICD-10-CM

## 2018-08-13 DIAGNOSIS — S31.31XD LACERATION OF SCROTUM, SUBSEQUENT ENCOUNTER: Primary | ICD-10-CM

## 2018-08-13 PROCEDURE — 99214 OFFICE O/P EST MOD 30 MIN: CPT | Mod: S$GLB,,, | Performed by: UROLOGY

## 2018-08-13 RX ORDER — TAMSULOSIN HYDROCHLORIDE 0.4 MG/1
0.4 CAPSULE ORAL DAILY
Qty: 30 CAPSULE | Refills: 11 | Status: SHIPPED | OUTPATIENT
Start: 2018-08-13 | End: 2020-02-06 | Stop reason: ALTCHOICE

## 2018-08-13 NOTE — LETTER
August 13, 2018    Maciel Contreras  [address] [place] [id number]             [facility]  [address], [address]  [facility] [address]  Phone: [phone]  Fax: [phone] To whom it may concern:    Maciel Contreras was recently treated by me for a scrotal laceration, which occurred due to trauma when he slipped transferring from his wheelchair on June 8 2018. A total of three procedures were required to heal the laceration. He had closure in the operating room on June 8th and a subsequent closure in the office on June 13th. The wound again dehisced, requiring a complex closure in the operating room on June 21st. Following this last procedure he was placed on bedrest for 10 days, which allowed for complete wound healing.      If you have any questions or concerns, please don't hesitate to call.    Sincerely,        Juan Adams MD

## 2018-08-13 NOTE — PROGRESS NOTES
"Subjective:      Maciel Contreras is a 63 y.o. male who returns today regarding his scrotal laceration.    S/p closure of scrotal laceration x 3, last in layers by me on 6/21/18. Incision has healed well in interim and he is here today for wound check.     Voiding well w/o garcia though does c/o split stream. His AUASS is 19/3.    The following portions of the patient's history were reviewed and updated as appropriate: allergies, current medications, past family history, past medical history, past social history, past surgical history and problem list.    Review of Systems  A comprehensive multipoint review of systems was negative except as otherwise stated in the HPI.     Objective:   Vitals: BP (!) 140/84 (BP Location: Left arm, Patient Position: Sitting, BP Method: Large (Automatic))   Pulse 92   Ht 6' 1" (1.854 m)   Wt 89.3 kg (196 lb 13.9 oz)   BMI 25.97 kg/m²     Physical Exam   General: alert and oriented, no acute distress  Respiratory: Symmetric expansion, non-labored breathing  Genitourinary: well healed scrotal incision   Skin: normal coloration and turgor, no rashes, no suspicious skin lesions noted  Neuro: no gross deficits  Psych: normal judgment and insight, normal mood/affect and non-anxious    Lab Review     Lab Results   Component Value Date    WBC 12.58 11/25/2015    HGB 15.1 11/25/2015    HCT 43.4 11/25/2015    MCV 88 11/25/2015     11/25/2015     Lab Results   Component Value Date    CREATININE 1.0 07/08/2016    BUN 13 07/08/2016     Lab Results   Component Value Date    PSA 0.82 02/25/2013       Assessment and Plan:   1. Laceration of scrotum, subsequent encounter  -- Doing well    2. LUTS and BPH  -- Trial flomax   "

## 2018-08-27 ENCOUNTER — LAB VISIT (OUTPATIENT)
Dept: LAB | Facility: OTHER | Age: 63
End: 2018-08-27
Attending: INTERNAL MEDICINE
Payer: MEDICARE

## 2018-08-27 DIAGNOSIS — Z12.5 SPECIAL SCREENING FOR MALIGNANT NEOPLASM OF PROSTATE: ICD-10-CM

## 2018-08-27 LAB — COMPLEXED PSA SERPL-MCNC: 1 NG/ML

## 2018-08-27 PROCEDURE — 36415 COLL VENOUS BLD VENIPUNCTURE: CPT

## 2018-08-27 PROCEDURE — 84153 ASSAY OF PSA TOTAL: CPT

## 2018-09-12 ENCOUNTER — OFFICE VISIT (OUTPATIENT)
Dept: WOUND CARE | Facility: CLINIC | Age: 63
End: 2018-09-12
Payer: MEDICARE

## 2018-09-12 VITALS
HEIGHT: 73 IN | DIASTOLIC BLOOD PRESSURE: 87 MMHG | BODY MASS INDEX: 26.51 KG/M2 | WEIGHT: 200 LBS | TEMPERATURE: 98 F | HEART RATE: 67 BPM | SYSTOLIC BLOOD PRESSURE: 145 MMHG

## 2018-09-12 DIAGNOSIS — T31.10 BURN ANY DEGREE INVOLVING 10-19 PERCENT OF BODY SURFACE: ICD-10-CM

## 2018-09-12 DIAGNOSIS — L89.620 DECUBITUS ULCER, HEEL, LEFT, UNSTAGEABLE: ICD-10-CM

## 2018-09-12 DIAGNOSIS — L89.314 PRESSURE ULCER OF ISCHIUM, RIGHT, STAGE IV: Primary | ICD-10-CM

## 2018-09-12 DIAGNOSIS — L89.510 DECUBITUS ULCER OF RIGHT ANKLE, UNSTAGEABLE: ICD-10-CM

## 2018-09-12 DIAGNOSIS — G82.20 PARAPLEGIA: ICD-10-CM

## 2018-09-12 PROBLEM — L89.500: Status: ACTIVE | Noted: 2018-09-12

## 2018-09-12 PROCEDURE — 99999 PR PBB SHADOW E&M-EST. PATIENT-LVL V: CPT | Mod: PBBFAC,,, | Performed by: NURSE PRACTITIONER

## 2018-09-12 PROCEDURE — 99212 OFFICE O/P EST SF 10 MIN: CPT | Mod: S$PBB,,, | Performed by: NURSE PRACTITIONER

## 2018-09-12 PROCEDURE — 99215 OFFICE O/P EST HI 40 MIN: CPT | Mod: PBBFAC | Performed by: NURSE PRACTITIONER
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
## 2018-10-05 ENCOUNTER — OFFICE VISIT (OUTPATIENT)
Dept: WOUND CARE | Facility: CLINIC | Age: 63
End: 2018-10-05
Payer: MEDICARE

## 2018-10-05 ENCOUNTER — TELEPHONE (OUTPATIENT)
Dept: WOUND CARE | Facility: CLINIC | Age: 63
End: 2018-10-05

## 2018-10-05 VITALS
BODY MASS INDEX: 25.58 KG/M2 | WEIGHT: 193 LBS | SYSTOLIC BLOOD PRESSURE: 118 MMHG | HEIGHT: 73 IN | DIASTOLIC BLOOD PRESSURE: 68 MMHG | HEART RATE: 64 BPM | TEMPERATURE: 98 F

## 2018-10-05 DIAGNOSIS — G82.20 PARAPLEGIA: ICD-10-CM

## 2018-10-05 DIAGNOSIS — T31.10 BURN ANY DEGREE INVOLVING 10-19 PERCENT OF BODY SURFACE: ICD-10-CM

## 2018-10-05 DIAGNOSIS — R60.0 EDEMA OF LEG: ICD-10-CM

## 2018-10-05 DIAGNOSIS — T14.8XXA WOUND INFECTION: Primary | ICD-10-CM

## 2018-10-05 DIAGNOSIS — L08.9 WOUND INFECTION: Primary | ICD-10-CM

## 2018-10-05 DIAGNOSIS — L89.620 DECUBITUS ULCER, HEEL, LEFT, UNSTAGEABLE: ICD-10-CM

## 2018-10-05 DIAGNOSIS — L89.510 PRESSURE INJURY OF RIGHT ANKLE, UNSTAGEABLE: ICD-10-CM

## 2018-10-05 DIAGNOSIS — L89.314 PRESSURE ULCER OF ISCHIUM, RIGHT, STAGE IV: Primary | ICD-10-CM

## 2018-10-05 PROBLEM — S31.31XA SCROTAL LACERATION: Status: RESOLVED | Noted: 2018-06-21 | Resolved: 2018-10-05

## 2018-10-05 PROCEDURE — 99212 OFFICE O/P EST SF 10 MIN: CPT | Mod: S$PBB,,, | Performed by: NURSE PRACTITIONER

## 2018-10-05 PROCEDURE — 99999 PR PBB SHADOW E&M-EST. PATIENT-LVL V: CPT | Mod: PBBFAC,,, | Performed by: NURSE PRACTITIONER

## 2018-10-05 PROCEDURE — 99215 OFFICE O/P EST HI 40 MIN: CPT | Mod: PBBFAC | Performed by: NURSE PRACTITIONER

## 2018-10-05 RX ORDER — DOXYCYCLINE 100 MG/1
100 CAPSULE ORAL 2 TIMES DAILY
Qty: 20 CAPSULE | Refills: 0 | Status: SHIPPED | OUTPATIENT
Start: 2018-10-05 | End: 2018-10-15
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
## 2018-10-05 NOTE — TELEPHONE ENCOUNTER
----- Message from Meme Bueno sent at 10/5/2018  1:49 PM CDT -----  Contact: Pt.Self   Pt. States he would like to speak with nurse in reference to getting more antibiotics called in to [facility]. Location please call Pt. Back @ [phone] Thank You

## 2018-10-12 ENCOUNTER — TELEPHONE (OUTPATIENT)
Dept: ADMINISTRATIVE | Facility: CLINIC | Age: 63
End: 2018-10-12

## 2018-11-16 ENCOUNTER — TELEPHONE (OUTPATIENT)
Dept: WOUND CARE | Facility: CLINIC | Age: 63
End: 2018-11-16

## 2018-11-16 NOTE — TELEPHONE ENCOUNTER
Called patient no answer and left voice message regarding missed appointment this morning for 8am - asked patient to call [phone] to reschedule visit

## 2018-12-13 ENCOUNTER — TELEPHONE (OUTPATIENT)
Dept: ADMINISTRATIVE | Facility: CLINIC | Age: 63
End: 2018-12-13

## 2019-01-09 ENCOUNTER — OFFICE VISIT (OUTPATIENT)
Dept: WOUND CARE | Facility: CLINIC | Age: 64
End: 2019-01-09
Payer: MEDICARE

## 2019-01-09 VITALS
HEIGHT: 73 IN | TEMPERATURE: 98 F | DIASTOLIC BLOOD PRESSURE: 62 MMHG | WEIGHT: 197.88 LBS | SYSTOLIC BLOOD PRESSURE: 114 MMHG | BODY MASS INDEX: 26.23 KG/M2 | HEART RATE: 77 BPM

## 2019-01-09 DIAGNOSIS — L89.620 DECUBITUS ULCER, HEEL, LEFT, UNSTAGEABLE: ICD-10-CM

## 2019-01-09 DIAGNOSIS — L89.510 PRESSURE INJURY OF RIGHT ANKLE, UNSTAGEABLE: Primary | ICD-10-CM

## 2019-01-09 PROBLEM — T31.10 BURN ANY DEGREE INVOLVING 10-19 PERCENT OF BODY SURFACE: Status: RESOLVED | Noted: 2018-09-12 | Resolved: 2019-01-09

## 2019-01-09 PROBLEM — L89.314: Status: RESOLVED | Noted: 2018-08-06 | Resolved: 2019-01-09

## 2019-01-09 PROCEDURE — 99212 OFFICE O/P EST SF 10 MIN: CPT | Mod: 25,S$PBB,, | Performed by: NURSE PRACTITIONER

## 2019-01-09 PROCEDURE — 11042 DBRDMT SUBQ TIS 1ST 20SQCM/<: CPT | Mod: S$PBB,,, | Performed by: NURSE PRACTITIONER

## 2019-01-09 PROCEDURE — 11042 PR DEBRIDEMENT, SKIN, SUB-Q TISSUE,=<20 SQ CM: ICD-10-PCS | Mod: S$PBB,,, | Performed by: NURSE PRACTITIONER

## 2019-01-09 PROCEDURE — 99215 OFFICE O/P EST HI 40 MIN: CPT | Mod: PBBFAC | Performed by: NURSE PRACTITIONER

## 2019-01-09 PROCEDURE — 99999 PR PBB SHADOW E&M-EST. PATIENT-LVL V: ICD-10-PCS | Mod: PBBFAC,,, | Performed by: NURSE PRACTITIONER

## 2019-01-09 PROCEDURE — 11042 DBRDMT SUBQ TIS 1ST 20SQCM/<: CPT | Mod: PBBFAC | Performed by: NURSE PRACTITIONER

## 2019-01-09 PROCEDURE — 99212 PR OFFICE/OUTPT VISIT, EST, LEVL II, 10-19 MIN: ICD-10-PCS | Mod: 25,S$PBB,, | Performed by: NURSE PRACTITIONER

## 2019-01-09 PROCEDURE — 99999 PR PBB SHADOW E&M-EST. PATIENT-LVL V: CPT | Mod: PBBFAC,,, | Performed by: NURSE PRACTITIONER
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
## 2019-02-13 ENCOUNTER — TELEPHONE (OUTPATIENT)
Dept: ADMINISTRATIVE | Facility: CLINIC | Age: 64
End: 2019-02-13

## 2019-02-25 LAB — NONINV COLON CA DNA+OCC BLD SCRN STL QL: NEGATIVE

## 2019-03-26 ENCOUNTER — TELEPHONE (OUTPATIENT)
Dept: WOUND CARE | Facility: CLINIC | Age: 64
End: 2019-03-26

## 2019-03-26 NOTE — TELEPHONE ENCOUNTER
----- Message from Meme Stanley sent at 3/26/2019  9:07 AM CDT -----  Contact: Tara Johnson w/ [facility]   Same Day Appointment Request    Was an appointment with another provider offered? Yes, 04/12      Reason for FST appt.: Tara states that patient has several wounds (8-9) mostly on his legs. States that patient has wound on his heel that she is really concerned about.    Communication Preference: [phone]

## 2019-03-26 NOTE — TELEPHONE ENCOUNTER
nurseTara advised patient has new wound to right heel - patient placed on scheduled for tomorrow 3/27/19 at 220pm -  nurse states patient's son is in town and will get him to appointment.  Also advised patient missed February appointment due to rotator cuff surgery.   nurse will call patient/son and advised of appointment date/time to arrive to clinic tomorrow.

## 2019-03-27 ENCOUNTER — OFFICE VISIT (OUTPATIENT)
Dept: WOUND CARE | Facility: CLINIC | Age: 64
End: 2019-03-27
Payer: MEDICARE

## 2019-03-27 VITALS
HEIGHT: 73 IN | WEIGHT: 197 LBS | SYSTOLIC BLOOD PRESSURE: 138 MMHG | BODY MASS INDEX: 26.11 KG/M2 | TEMPERATURE: 97 F | DIASTOLIC BLOOD PRESSURE: 77 MMHG

## 2019-03-27 DIAGNOSIS — I73.9 PERIPHERAL VASCULAR DISEASE: ICD-10-CM

## 2019-03-27 DIAGNOSIS — L89.520 PRESSURE INJURY OF LEFT ANKLE, UNSTAGEABLE: ICD-10-CM

## 2019-03-27 DIAGNOSIS — L89.510 PRESSURE INJURY OF RIGHT ANKLE, UNSTAGEABLE: Primary | ICD-10-CM

## 2019-03-27 DIAGNOSIS — G82.20 PARAPLEGIA: ICD-10-CM

## 2019-03-27 DIAGNOSIS — L89.890 PRESSURE INJURY OF RIGHT FOOT, UNSTAGEABLE: ICD-10-CM

## 2019-03-27 DIAGNOSIS — L89.890 PRESSURE INJURY OF LEFT FOOT, UNSTAGEABLE: ICD-10-CM

## 2019-03-27 DIAGNOSIS — L89.610 DECUBITUS ULCER, HEEL, RIGHT, UNSTAGEABLE: ICD-10-CM

## 2019-03-27 PROCEDURE — 99999 PR PBB SHADOW E&M-EST. PATIENT-LVL V: CPT | Mod: PBBFAC,,, | Performed by: NURSE PRACTITIONER

## 2019-03-27 PROCEDURE — 99999 PR PBB SHADOW E&M-EST. PATIENT-LVL V: ICD-10-PCS | Mod: PBBFAC,,, | Performed by: NURSE PRACTITIONER

## 2019-03-27 PROCEDURE — 99212 PR OFFICE/OUTPT VISIT, EST, LEVL II, 10-19 MIN: ICD-10-PCS | Mod: S$PBB,,, | Performed by: NURSE PRACTITIONER

## 2019-03-27 PROCEDURE — 99215 OFFICE O/P EST HI 40 MIN: CPT | Mod: PBBFAC | Performed by: NURSE PRACTITIONER

## 2019-03-27 PROCEDURE — 99212 OFFICE O/P EST SF 10 MIN: CPT | Mod: S$PBB,,, | Performed by: NURSE PRACTITIONER

## 2019-03-27 NOTE — PATIENT INSTRUCTIONS
"Apply betadine to bilateral foot, heel and ankle ulcers.  Place cotton in between toes.  Cover wounds with cotton gauze and secure with roll gauze.  Compression with two 4" ace wraps bilateral lower legs.  Keep pressure off of bony prominences at all times.  Rotate position every 2 hours.  Change dressings daily.    "

## 2019-03-27 NOTE — PROGRESS NOTES
"Subjective:       Patient ID: Maciel Contreras is a 63 y.o. male.    Chief Complaint: Wound Check    Wound Check       This patient is seen today for reevaluation of recurrent decubiti to both feet.  When he was seen in early January he had an ulcer to the left heel and right ankle.  He now has multiple ulcers to both feet, ankles and heels.  He has foam dressings in place to  the wounds and they are not healing. He is paraplegic and these wounds are related to pressure.  He sleeps on his side at night contributing to the increased pressure. He has home health services three times a week through [facility].  His medical history is significant for paraplegia.  He is afebrile. He denies increased redness or purulent drainage but does have increased edema.  He does not complain of pain   Review of Systems    Unchanged from prior visit.  Objective:      Physical Exam   Constitutional: He is oriented to person, place, and time. He appears well-developed and well-nourished. No distress.   HENT:   Head: Normocephalic and atraumatic.   Musculoskeletal: He exhibits no edema or tenderness.        Feet:    Patient is paraplegic and unable to voluntarily move lower extremities.   Neurological: He is alert and oriented to person, place, and time.   Skin: Skin is warm and dry. No rash noted. He is not diaphoretic. No cyanosis or erythema. Nails show no clubbing.   Psychiatric: He has a normal mood and affect. His behavior is normal. Judgment and thought content normal.   Nursing note and vitals reviewed.        Assessment:       1. Pressure injury of right ankle, unstageable    2. Decubitus ulcer, heel, left, unstageable    3. Paraplegia        Plan:           JOSE ALBERTO/PVRs.  Apply betadine to bilateral foot, heel and ankle ulcers.  Place cotton in between toes.  Cover wounds with cotton gauze and secure with roll gauze.  Compression with two 4" ace wraps bilateral lower legs.  Keep pressure off of bony prominences at all " times.  Rotate position every 2 hours.  Change dressings daily.  [facility] notified of orders via Epic email. We will ask them to continue to see the patient three times weekly.  Return to clinic in one month.    Right heel    Right lateral ankle    Right dorsal foot    Left dorsal foot    Left lateral heel

## 2019-04-02 ENCOUNTER — TELEPHONE (OUTPATIENT)
Dept: WOUND CARE | Facility: CLINIC | Age: 64
End: 2019-04-02

## 2019-04-02 NOTE — TELEPHONE ENCOUNTER
----- Message from Luz Maria Contreras sent at 4/2/2019  1:40 PM CDT -----  Contact: patient  Please call above patient need to reschedule vascular appointment waiting on a call from the nurse call [phone] thanks

## 2019-04-10 ENCOUNTER — OFFICE VISIT (OUTPATIENT)
Dept: WOUND CARE | Facility: CLINIC | Age: 64
End: 2019-04-10
Payer: MEDICARE

## 2019-04-10 VITALS
HEART RATE: 70 BPM | SYSTOLIC BLOOD PRESSURE: 132 MMHG | DIASTOLIC BLOOD PRESSURE: 77 MMHG | WEIGHT: 197.06 LBS | BODY MASS INDEX: 26.12 KG/M2 | HEIGHT: 73 IN | TEMPERATURE: 99 F

## 2019-04-10 DIAGNOSIS — L89.890 PRESSURE INJURY OF RIGHT FOOT, UNSTAGEABLE: Primary | ICD-10-CM

## 2019-04-10 DIAGNOSIS — L89.610 DECUBITUS ULCER, HEEL, RIGHT, UNSTAGEABLE: ICD-10-CM

## 2019-04-10 DIAGNOSIS — L89.510 PRESSURE INJURY OF RIGHT ANKLE, UNSTAGEABLE: ICD-10-CM

## 2019-04-10 DIAGNOSIS — G82.20 PARAPLEGIA: ICD-10-CM

## 2019-04-10 DIAGNOSIS — L89.890 PRESSURE INJURY OF LEFT FOOT, UNSTAGEABLE: ICD-10-CM

## 2019-04-10 DIAGNOSIS — L89.520 PRESSURE INJURY OF LEFT ANKLE, UNSTAGEABLE: ICD-10-CM

## 2019-04-10 PROBLEM — S31.31XA LACERATION OF SCROTUM: Status: RESOLVED | Noted: 2018-06-08 | Resolved: 2019-04-10

## 2019-04-10 PROCEDURE — 99215 OFFICE O/P EST HI 40 MIN: CPT | Mod: PBBFAC | Performed by: NURSE PRACTITIONER

## 2019-04-10 PROCEDURE — 99212 OFFICE O/P EST SF 10 MIN: CPT | Mod: S$PBB,,, | Performed by: NURSE PRACTITIONER

## 2019-04-10 PROCEDURE — 99999 PR PBB SHADOW E&M-EST. PATIENT-LVL V: ICD-10-PCS | Mod: PBBFAC,,, | Performed by: NURSE PRACTITIONER

## 2019-04-10 PROCEDURE — 99212 PR OFFICE/OUTPT VISIT, EST, LEVL II, 10-19 MIN: ICD-10-PCS | Mod: S$PBB,,, | Performed by: NURSE PRACTITIONER

## 2019-04-10 PROCEDURE — 99999 PR PBB SHADOW E&M-EST. PATIENT-LVL V: CPT | Mod: PBBFAC,,, | Performed by: NURSE PRACTITIONER
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
## 2019-05-08 ENCOUNTER — PATIENT MESSAGE (OUTPATIENT)
Dept: WOUND CARE | Facility: CLINIC | Age: 64
End: 2019-05-08

## 2019-05-31 ENCOUNTER — OFFICE VISIT (OUTPATIENT)
Dept: WOUND CARE | Facility: CLINIC | Age: 64
End: 2019-05-31
Payer: MEDICARE

## 2019-05-31 ENCOUNTER — HOSPITAL ENCOUNTER (OUTPATIENT)
Dept: VASCULAR SURGERY | Facility: CLINIC | Age: 64
Discharge: HOME | End: 2019-05-31
Attending: NURSE PRACTITIONER
Payer: MEDICARE

## 2019-05-31 VITALS
BODY MASS INDEX: 26.11 KG/M2 | DIASTOLIC BLOOD PRESSURE: 77 MMHG | WEIGHT: 197 LBS | SYSTOLIC BLOOD PRESSURE: 128 MMHG | HEIGHT: 73 IN | HEART RATE: 65 BPM | TEMPERATURE: 98 F

## 2019-05-31 DIAGNOSIS — I73.9 PERIPHERAL VASCULAR DISEASE: ICD-10-CM

## 2019-05-31 DIAGNOSIS — G82.20 PARAPLEGIA: ICD-10-CM

## 2019-05-31 DIAGNOSIS — L89.610 DECUBITUS ULCER, HEEL, RIGHT, UNSTAGEABLE: ICD-10-CM

## 2019-05-31 DIAGNOSIS — L89.520 PRESSURE INJURY OF LEFT ANKLE, UNSTAGEABLE: Primary | ICD-10-CM

## 2019-05-31 DIAGNOSIS — L89.890 PRESSURE INJURY OF LEFT FOOT, UNSTAGEABLE: ICD-10-CM

## 2019-05-31 DIAGNOSIS — L89.890 PRESSURE INJURY OF RIGHT FOOT, UNSTAGEABLE: ICD-10-CM

## 2019-05-31 DIAGNOSIS — L89.510 PRESSURE INJURY OF RIGHT ANKLE, UNSTAGEABLE: ICD-10-CM

## 2019-05-31 PROBLEM — L89.152 DECUBITUS ULCER OF SACRAL REGION, STAGE 2: Status: RESOLVED | Noted: 2019-05-31 | Resolved: 2019-05-31

## 2019-05-31 PROBLEM — L89.152 DECUBITUS ULCER OF SACRAL REGION, STAGE 2: Status: ACTIVE | Noted: 2019-05-31

## 2019-05-31 PROCEDURE — 11045 DBRDMT SUBQ TISS EACH ADDL: CPT | Mod: PBBFAC | Performed by: NURSE PRACTITIONER

## 2019-05-31 PROCEDURE — 11045 DBRDMT SUBQ TISS EACH ADDL: CPT | Mod: S$PBB,,, | Performed by: NURSE PRACTITIONER

## 2019-05-31 PROCEDURE — 93923 UPR/LXTR ART STDY 3+ LVLS: CPT | Mod: PBBFAC | Performed by: SURGERY

## 2019-05-31 PROCEDURE — 99215 OFFICE O/P EST HI 40 MIN: CPT | Mod: PBBFAC,25 | Performed by: NURSE PRACTITIONER

## 2019-05-31 PROCEDURE — 99999 PR PBB SHADOW E&M-EST. PATIENT-LVL V: ICD-10-PCS | Mod: PBBFAC,,, | Performed by: NURSE PRACTITIONER

## 2019-05-31 PROCEDURE — 99212 OFFICE O/P EST SF 10 MIN: CPT | Mod: 25,S$PBB,, | Performed by: NURSE PRACTITIONER

## 2019-05-31 PROCEDURE — 93923 UPR/LXTR ART STDY 3+ LVLS: CPT | Mod: 26,S$PBB,, | Performed by: SURGERY

## 2019-05-31 PROCEDURE — 93923 PR NON-INVASIVE PHYSIOLOGIC STUDY EXTREMITY 3 LEVELS: ICD-10-PCS | Mod: 26,S$PBB,, | Performed by: SURGERY

## 2019-05-31 PROCEDURE — 11042 PR DEBRIDEMENT, SKIN, SUB-Q TISSUE,=<20 SQ CM: ICD-10-PCS | Mod: S$PBB,,, | Performed by: NURSE PRACTITIONER

## 2019-05-31 PROCEDURE — 11042 DBRDMT SUBQ TIS 1ST 20SQCM/<: CPT | Mod: PBBFAC | Performed by: NURSE PRACTITIONER

## 2019-05-31 PROCEDURE — 11045 PR DEB SUBQ TISSUE ADD-ON: ICD-10-PCS | Mod: S$PBB,,, | Performed by: NURSE PRACTITIONER

## 2019-05-31 PROCEDURE — 11042 DBRDMT SUBQ TIS 1ST 20SQCM/<: CPT | Mod: S$PBB,,, | Performed by: NURSE PRACTITIONER

## 2019-05-31 PROCEDURE — 99212 PR OFFICE/OUTPT VISIT, EST, LEVL II, 10-19 MIN: ICD-10-PCS | Mod: 25,S$PBB,, | Performed by: NURSE PRACTITIONER

## 2019-05-31 PROCEDURE — 99999 PR PBB SHADOW E&M-EST. PATIENT-LVL V: CPT | Mod: PBBFAC,,, | Performed by: NURSE PRACTITIONER

## 2019-05-31 NOTE — PROGRESS NOTES
Subjective:       Patient ID: Maciel Contreras is a 63 y.o. male.    Chief Complaint: Wound Check    Wound Check       This patient is seen today for reevaluation of recurrent decubiti to both feet and sacral area.  The sacral wound has healed and he has been painting the foot wounds with betadine daily.  The wounds are healing as evidenced by the presence of healthy granulation tissue and wound contracture.  He is paraplegic and these wounds are all related to pressure.  He sleeps on his side at night crossing his ankles one over the other contributing to the increased pressure. He has home health services three times a week through [facility].  His medical history is significant for paraplegia.  He is afebrile. He denies increased redness or purulent drainage but does have increased edema.  He does not complain of pain   Review of Systems    Unchanged from prior visit.  Objective:      Physical Exam   Constitutional: He is oriented to person, place, and time. He appears well-developed and well-nourished. No distress.   HENT:   Head: Normocephalic and atraumatic.   Musculoskeletal: He exhibits no edema or tenderness.        Legs:       Feet:    Patient is paraplegic and unable to voluntarily move lower extremities.   Neurological: He is alert and oriented to person, place, and time.   Skin: Skin is warm and dry. No rash noted. He is not diaphoretic. No cyanosis or erythema. Nails show no clubbing.   Psychiatric: He has a normal mood and affect. His behavior is normal. Judgment and thought content normal.   Nursing note and vitals reviewed.      A vascular lab study performed today revealed:  The right JOSE ALBERTO is 1.44 which is unreliable due to suspected medial calcinosis; however, PVR waveforms suggest minimal PAOD.  The lrft JOSE ALBERTO is 1.62 which is unreliable due to suspected medial calcinosis; however, PVR waveforms suggest minimal PAOD.    Procedure: Maciel was seen in the clinic room and placed in the supine  position on the treatment table. Attention was directed to the ulcer which was located on the right heel, where an wound measuring 5.0x4.0x0.1 cm is noted. The wound was covered with 30% necrotic tissue and a mild callused rim. No acute signs of infection were noted. The wound was non-tender. The wound was prepped with alcohol. Utilizing a #15 scalpel and pickups, I debrided the wound full-thickness through skin and subcutaneous tissue to excise viable and nonviable tissue inside the wound margins. The patient tolerated well. Because of a lack of sensation, anesthesia was not necessary. The wound was flushed with wound . There was minimal bleeding with debridement which was well controlled with direct pressure. The wound was then dressed with medihoney gel. The patient tolerated the procedure well.    Attention was then directed to the ulcer which was located on the right anterior ankle, where an wound measuring 3.8x2.5x0.1 cm is noted. The wound was covered with 30% necrotic tissue. No acute signs of infection were noted. The wound was non-tender. The wound was prepped with alcohol. Utilizing a #15 scalpel and pickups, I debrided the wound full-thickness through skin and subcutaneous tissue to excise viable and nonviable tissue inside the wound margins. The patient tolerated well. Because of a lack of sensation, anesthesia was not necessary. The wound was flushed with wound . There was minimal bleeding with debridement which was well controlled with direct pressure. The wound was then dressed with medihoney gel. The patient tolerated the procedure well.  Assessment:       1. Pressure injury of left ankle, unstageable    2. Pressure injury of left foot, unstageable    3. Pressure injury of right foot, unstageable    4. Peripheral vascular disease    5. Decubitus ulcer, heel, right, unstageable    6. Pressure injury of right ankle, unstageable    7. Decubitus ulcer of sacral region, stage 2    8.  Paraplegia        Plan:           Apply santyl to bilateral foot, heel and ankle ulcers, cover with hydrofiber and gauze and secure with roll gauze.  Place cotton in between toes.  Keep pressure off of bony prominences at all times.  Rotate position every 2 hours.  Change foot dressings three times weekly.  [facility] notified of orders via Epic email. We will ask them to continue to see the patient three times weekly.  Return to clinic in one month.    Right heel pre debridement      Right lateral ankle/dorsal foot      Right anterior ankle/dorsal foot      Left dorsal foot and ankle      Left lateral heel healed      Stage II sacral decubitus healed

## 2019-07-20 ENCOUNTER — OFFICE VISIT (OUTPATIENT)
Dept: WOUND CARE | Facility: CLINIC | Age: 64
End: 2019-07-20
Payer: MEDICARE

## 2019-07-20 VITALS
HEIGHT: 73 IN | TEMPERATURE: 98 F | HEART RATE: 80 BPM | SYSTOLIC BLOOD PRESSURE: 133 MMHG | DIASTOLIC BLOOD PRESSURE: 75 MMHG | BODY MASS INDEX: 25.18 KG/M2 | WEIGHT: 190 LBS

## 2019-07-20 DIAGNOSIS — L89.610 DECUBITUS ULCER, HEEL, RIGHT, UNSTAGEABLE: ICD-10-CM

## 2019-07-20 DIAGNOSIS — G82.20 PARAPLEGIA: ICD-10-CM

## 2019-07-20 DIAGNOSIS — L89.890 PRESSURE INJURY OF RIGHT FOOT, UNSTAGEABLE: Primary | ICD-10-CM

## 2019-07-20 DIAGNOSIS — L89.890 PRESSURE INJURY OF LEFT FOOT, UNSTAGEABLE: ICD-10-CM

## 2019-07-20 PROBLEM — L89.520 PRESSURE INJURY OF LEFT ANKLE, UNSTAGEABLE: Status: RESOLVED | Noted: 2019-03-27 | Resolved: 2019-07-20

## 2019-07-20 PROCEDURE — 99999 PR PBB SHADOW E&M-EST. PATIENT-LVL V: CPT | Mod: PBBFAC,,, | Performed by: NURSE PRACTITIONER

## 2019-07-20 PROCEDURE — 99212 OFFICE O/P EST SF 10 MIN: CPT | Mod: S$PBB,,, | Performed by: NURSE PRACTITIONER

## 2019-07-20 PROCEDURE — 99215 OFFICE O/P EST HI 40 MIN: CPT | Mod: PBBFAC | Performed by: NURSE PRACTITIONER

## 2019-07-20 PROCEDURE — 99999 PR PBB SHADOW E&M-EST. PATIENT-LVL V: ICD-10-PCS | Mod: PBBFAC,,, | Performed by: NURSE PRACTITIONER

## 2019-07-20 PROCEDURE — 99212 PR OFFICE/OUTPT VISIT, EST, LEVL II, 10-19 MIN: ICD-10-PCS | Mod: S$PBB,,, | Performed by: NURSE PRACTITIONER

## 2019-07-20 NOTE — PROGRESS NOTES
Subjective:       Patient ID: Maciel Contreras is a 64 y.o. male.    Chief Complaint: Wound Check    Wound Check     This patient is seen today for reevaluation of recurrent decubiti to both feet and sacral area.  The sacral wound has healed and he has been painting the foot wounds with betadine daily.  The wounds are healing as evidenced by the presence of healthy granulation tissue and wound contracture. However there is tendon exposed on the left dorsal foot ulcer. He is paraplegic and these wounds are all related to pressure.  He sleeps on his side at night crossing his ankles one over the other contributing to the increased pressure. He has home health services three times a week through [facility].  His medical history is significant for paraplegia.  He is afebrile. He denies increased redness or purulent drainage but does have increased edema.  He does not complain of pain   Review of Systems    Unchanged from prior visit.  Objective:      Physical Exam   Constitutional: He is oriented to person, place, and time. He appears well-developed and well-nourished. No distress.   HENT:   Head: Normocephalic and atraumatic.   Musculoskeletal: He exhibits no edema or tenderness.        Feet:    Patient is paraplegic and unable to voluntarily move lower extremities.   Neurological: He is alert and oriented to person, place, and time.   Skin: Skin is warm and dry. No rash noted. He is not diaphoretic. No cyanosis or erythema. Nails show no clubbing.   Psychiatric: He has a normal mood and affect. His behavior is normal. Judgment and thought content normal.   Nursing note and vitals reviewed.        Assessment:       1. Pressure injury of right foot, unstageable    2. Decubitus ulcer, heel, right, unstageable    3. Pressure injury of left ankle, unstageable    4. Pressure injury of left foot, unstageable    5. Paraplegia        Plan:           Apply santyl to bilateral foot, heel and ankle ulcers, cover with hydrofiber  and gauze and secure with roll gauze.  Place cotton in between toes.  Flexnet to secure dressing.  Keep pressure off of bony prominences at all times.  Rotate position every 2 hours.  Change foot dressings three times weekly.  [facility] notified of orders via Epic email. We will ask them to continue to see the patient three times weekly.  Return to clinic in one month.    Right heel       Right lateral ankle/dorsal foot      Right anterior ankle/dorsal foot      Left dorsal foot and ankle

## 2019-09-11 ENCOUNTER — LAB VISIT (OUTPATIENT)
Dept: LAB | Facility: OTHER | Age: 64
End: 2019-09-11
Attending: INTERNAL MEDICINE
Payer: MEDICARE

## 2019-09-11 DIAGNOSIS — R79.9 ABNORMAL FINDING OF BLOOD CHEMISTRY: ICD-10-CM

## 2019-09-11 DIAGNOSIS — Z00.00 ROUTINE GENERAL MEDICAL EXAMINATION AT A HEALTH CARE FACILITY: ICD-10-CM

## 2019-09-11 DIAGNOSIS — Z12.5 ENCOUNTER FOR SCREENING FOR MALIGNANT NEOPLASM OF PROSTATE: ICD-10-CM

## 2019-09-11 DIAGNOSIS — R73.03 PREDIABETES: ICD-10-CM

## 2019-09-11 LAB
ALBUMIN SERPL BCP-MCNC: 3.9 G/DL (ref 3.5–5.2)
ALP SERPL-CCNC: 65 U/L (ref 55–135)
ALT SERPL W/O P-5'-P-CCNC: 18 U/L (ref 10–44)
ANION GAP SERPL CALC-SCNC: 12 MMOL/L (ref 8–16)
AST SERPL-CCNC: 18 U/L (ref 10–40)
BASOPHILS # BLD AUTO: 0.08 K/UL (ref 0–0.2)
BASOPHILS NFR BLD: 0.7 % (ref 0–1.9)
BILIRUB SERPL-MCNC: 0.4 MG/DL (ref 0.1–1)
BUN SERPL-MCNC: 11 MG/DL (ref 8–23)
CALCIUM SERPL-MCNC: 10.3 MG/DL (ref 8.7–10.5)
CHLORIDE SERPL-SCNC: 94 MMOL/L (ref 95–110)
CHOLEST SERPL-MCNC: 98 MG/DL (ref 120–199)
CHOLEST/HDLC SERPL: 3.2 {RATIO} (ref 2–5)
CO2 SERPL-SCNC: 34 MMOL/L (ref 23–29)
COMPLEXED PSA SERPL-MCNC: 1.6 NG/ML (ref 0–4)
CREAT SERPL-MCNC: 0.8 MG/DL (ref 0.5–1.4)
DIFFERENTIAL METHOD: ABNORMAL
EOSINOPHIL # BLD AUTO: 0.4 K/UL (ref 0–0.5)
EOSINOPHIL NFR BLD: 3.4 % (ref 0–8)
ERYTHROCYTE [DISTWIDTH] IN BLOOD BY AUTOMATED COUNT: 18.6 % (ref 11.5–14.5)
EST. GFR  (AFRICAN AMERICAN): >60 ML/MIN/1.73 M^2
EST. GFR  (NON AFRICAN AMERICAN): >60 ML/MIN/1.73 M^2
GLUCOSE SERPL-MCNC: 100 MG/DL (ref 70–110)
HCT VFR BLD AUTO: 48.3 % (ref 40–54)
HDLC SERPL-MCNC: 31 MG/DL (ref 40–75)
HDLC SERPL: 31.6 % (ref 20–50)
HGB BLD-MCNC: 15.9 G/DL (ref 14–18)
IMM GRANULOCYTES # BLD AUTO: 0.05 K/UL (ref 0–0.04)
IMM GRANULOCYTES NFR BLD AUTO: 0.4 % (ref 0–0.5)
LDLC SERPL CALC-MCNC: 30.6 MG/DL (ref 63–159)
LYMPHOCYTES # BLD AUTO: 4 K/UL (ref 1–4.8)
LYMPHOCYTES NFR BLD: 35.1 % (ref 18–48)
MCH RBC QN AUTO: 28.1 PG (ref 27–31)
MCHC RBC AUTO-ENTMCNC: 32.9 G/DL (ref 32–36)
MCV RBC AUTO: 86 FL (ref 82–98)
MONOCYTES # BLD AUTO: 1.1 K/UL (ref 0.3–1)
MONOCYTES NFR BLD: 9.4 % (ref 4–15)
NEUTROPHILS # BLD AUTO: 5.8 K/UL (ref 1.8–7.7)
NEUTROPHILS NFR BLD: 51 % (ref 38–73)
NONHDLC SERPL-MCNC: 67 MG/DL
NRBC BLD-RTO: 0 /100 WBC
PLATELET # BLD AUTO: 341 K/UL (ref 150–350)
PMV BLD AUTO: 11 FL (ref 9.2–12.9)
POTASSIUM SERPL-SCNC: 3 MMOL/L (ref 3.5–5.1)
PROT SERPL-MCNC: 8.1 G/DL (ref 6–8.4)
RBC # BLD AUTO: 5.65 M/UL (ref 4.6–6.2)
SODIUM SERPL-SCNC: 140 MMOL/L (ref 136–145)
TRIGL SERPL-MCNC: 182 MG/DL (ref 30–150)
TSH SERPL DL<=0.005 MIU/L-ACNC: 1.23 UIU/ML (ref 0.4–4)
WBC # BLD AUTO: 11.36 K/UL (ref 3.9–12.7)

## 2019-09-11 PROCEDURE — 80061 LIPID PANEL: CPT

## 2019-09-11 PROCEDURE — 84153 ASSAY OF PSA TOTAL: CPT

## 2019-09-11 PROCEDURE — 80053 COMPREHEN METABOLIC PANEL: CPT

## 2019-09-11 PROCEDURE — 84443 ASSAY THYROID STIM HORMONE: CPT

## 2019-09-11 PROCEDURE — 36415 COLL VENOUS BLD VENIPUNCTURE: CPT

## 2019-09-11 PROCEDURE — 85025 COMPLETE CBC W/AUTO DIFF WBC: CPT

## 2019-09-12 ENCOUNTER — APPOINTMENT (OUTPATIENT)
Dept: LAB | Facility: HOSPITAL | Age: 64
End: 2019-09-12
Attending: PATHOLOGY
Payer: MEDICARE

## 2019-09-27 ENCOUNTER — OFFICE VISIT (OUTPATIENT)
Dept: WOUND CARE | Facility: CLINIC | Age: 64
End: 2019-09-27
Payer: MEDICARE

## 2019-09-27 VITALS
BODY MASS INDEX: 26.08 KG/M2 | SYSTOLIC BLOOD PRESSURE: 125 MMHG | TEMPERATURE: 98 F | WEIGHT: 196.81 LBS | DIASTOLIC BLOOD PRESSURE: 75 MMHG | HEIGHT: 73 IN | HEART RATE: 66 BPM

## 2019-09-27 DIAGNOSIS — L89.510 PRESSURE INJURY OF RIGHT ANKLE, UNSTAGEABLE: ICD-10-CM

## 2019-09-27 DIAGNOSIS — L89.610 PRESSURE INJURY OF RIGHT HEEL, UNSTAGEABLE: ICD-10-CM

## 2019-09-27 DIAGNOSIS — G82.20 PARAPLEGIA: ICD-10-CM

## 2019-09-27 DIAGNOSIS — L89.890 PRESSURE INJURY OF LEFT FOOT, UNSTAGEABLE: Primary | ICD-10-CM

## 2019-09-27 PROCEDURE — 99212 OFFICE O/P EST SF 10 MIN: CPT | Mod: S$PBB,,, | Performed by: NURSE PRACTITIONER

## 2019-09-27 PROCEDURE — 99215 OFFICE O/P EST HI 40 MIN: CPT | Mod: PBBFAC | Performed by: NURSE PRACTITIONER

## 2019-09-27 PROCEDURE — 99999 PR PBB SHADOW E&M-EST. PATIENT-LVL V: CPT | Mod: PBBFAC,,, | Performed by: NURSE PRACTITIONER

## 2019-09-27 PROCEDURE — 99212 PR OFFICE/OUTPT VISIT, EST, LEVL II, 10-19 MIN: ICD-10-PCS | Mod: S$PBB,,, | Performed by: NURSE PRACTITIONER

## 2019-09-27 PROCEDURE — 99999 PR PBB SHADOW E&M-EST. PATIENT-LVL V: ICD-10-PCS | Mod: PBBFAC,,, | Performed by: NURSE PRACTITIONER

## 2019-09-27 NOTE — PROGRESS NOTES
Subjective:       Patient ID: Maciel Contreras is a 64 y.o. male.    Chief Complaint: Wound Check    Wound Check       This patient is seen today for reevaluation of recurrent decubiti to both feet.  The The only foot wound that remains is on the right heel.  However he has a recurrent wound to the right hip.  A foam border dressing is on this wound. He is paraplegic and these wounds are all related to pressure.  He sleeps on his side at night crossing his ankles one over the other contributing to the increased pressure. He has home health services three times a week through [facility].  His medical history is significant for paraplegia.  He is afebrile. He denies increased redness or purulent drainage but does have increased edema.  He does not complain of pain   Review of Systems    Unchanged from prior visit.  Objective:      Physical Exam   Constitutional: He is oriented to person, place, and time. He appears well-developed and well-nourished. No distress.   HENT:   Head: Normocephalic and atraumatic.   Musculoskeletal: He exhibits no edema or tenderness.        Back:         Feet:    Patient is paraplegic and unable to voluntarily move lower extremities.   Neurological: He is alert and oriented to person, place, and time.   Skin: Skin is warm and dry. No rash noted. He is not diaphoretic. No cyanosis or erythema. Nails show no clubbing.   Psychiatric: He has a normal mood and affect. His behavior is normal. Judgment and thought content normal.   Nursing note and vitals reviewed.        Assessment:       1. Pressure injury of left foot, unstageable    2. Pressure injury of right ankle, unstageable    3. Pressure injury of right heel, unstageable    4. Paraplegia        Plan:           Cleanse wounds with wound cleanser.  Apply santyl to right hip ulcers.  Apply skin prep to periwound area.  Apply aquacel foam border dressings to right hip and right heel ulcers.  Cover right heel ulcer with roll gauze to secure  dressing.  Place cotton in between toes.  Flexnet to secure dressing.  Keep pressure off of bony prominences at all times.  Rotate position every 2 hours.  Change foot dressings three times weekly.  [facility] notified of orders via Epic email. We will ask them to continue to see the patient three times weekly.  Return to clinic in one month.    Right heel       Right lateral ankle and foot    Right anterior ankle    Left dorsal ankle and foot    Left heel    Right hip

## 2019-12-04 ENCOUNTER — OFFICE VISIT (OUTPATIENT)
Dept: WOUND CARE | Facility: CLINIC | Age: 64
End: 2019-12-04
Payer: MEDICARE

## 2019-12-04 VITALS
WEIGHT: 199 LBS | SYSTOLIC BLOOD PRESSURE: 126 MMHG | DIASTOLIC BLOOD PRESSURE: 70 MMHG | HEIGHT: 73 IN | BODY MASS INDEX: 26.37 KG/M2 | TEMPERATURE: 98 F | HEART RATE: 70 BPM

## 2019-12-04 DIAGNOSIS — L89.613 PRESSURE INJURY OF RIGHT HEEL, STAGE 3: Primary | ICD-10-CM

## 2019-12-04 DIAGNOSIS — L92.9 ABNORMAL GRANULATION TISSUE: ICD-10-CM

## 2019-12-04 DIAGNOSIS — G82.20 PARAPLEGIA: ICD-10-CM

## 2019-12-04 DIAGNOSIS — L89.212: ICD-10-CM

## 2019-12-04 PROBLEM — L89.890 PRESSURE INJURY OF LEFT FOOT, UNSTAGEABLE: Status: RESOLVED | Noted: 2019-03-27 | Resolved: 2019-12-04

## 2019-12-04 PROBLEM — L89.210: Status: ACTIVE | Noted: 2019-12-04

## 2019-12-04 PROBLEM — L89.500: Status: RESOLVED | Noted: 2018-09-12 | Resolved: 2019-12-04

## 2019-12-04 PROCEDURE — 99213 PR OFFICE/OUTPT VISIT, EST, LEVL III, 20-29 MIN: ICD-10-PCS | Mod: 25,S$PBB,, | Performed by: NURSE PRACTITIONER

## 2019-12-04 PROCEDURE — 17250 CHEM CAUT OF GRANLTJ TISSUE: CPT | Mod: PBBFAC | Performed by: NURSE PRACTITIONER

## 2019-12-04 PROCEDURE — 99999 PR PBB SHADOW E&M-EST. PATIENT-LVL V: ICD-10-PCS | Mod: PBBFAC,,, | Performed by: NURSE PRACTITIONER

## 2019-12-04 PROCEDURE — 99999 PR PBB SHADOW E&M-EST. PATIENT-LVL V: CPT | Mod: PBBFAC,,, | Performed by: NURSE PRACTITIONER

## 2019-12-04 PROCEDURE — 99213 OFFICE O/P EST LOW 20 MIN: CPT | Mod: 25,S$PBB,, | Performed by: NURSE PRACTITIONER

## 2019-12-04 PROCEDURE — 17250 PR CHEM CAUTERY GRANULATN TISSUE: ICD-10-PCS | Mod: S$PBB,,, | Performed by: NURSE PRACTITIONER

## 2019-12-04 PROCEDURE — 17250 CHEM CAUT OF GRANLTJ TISSUE: CPT | Mod: S$PBB,,, | Performed by: NURSE PRACTITIONER

## 2019-12-04 PROCEDURE — 99215 OFFICE O/P EST HI 40 MIN: CPT | Mod: PBBFAC,25 | Performed by: NURSE PRACTITIONER

## 2019-12-04 NOTE — PROGRESS NOTES
Subjective:       Patient ID: Maciel Contreras is a 64 y.o. male.    Chief Complaint: Wound Check    Wound Check       This patient is seen today for reevaluation of recurrent decubiti to the right hip and right heel.  A foam border dressing is on this right heel wound. Nothing is on the right hip wound because it was healed but has recurred. He is paraplegic and these wounds are all related to pressure.  He has not been keeping pressure off of the right heel wound. He has home health services three times a week through [facility].  His medical history is significant for paraplegia.  He is afebrile. He denies increased redness or purulent drainage but does have increased edema.  His pain level is 5/10.   Review of Systems    Unchanged from prior visit.  Objective:      Physical Exam   Constitutional: He is oriented to person, place, and time. He appears well-developed and well-nourished. No distress.   HENT:   Head: Normocephalic and atraumatic.   Musculoskeletal: He exhibits no edema or tenderness.        Legs:       Feet:    Patient is paraplegic and unable to voluntarily move lower extremities.   Neurological: He is alert and oriented to person, place, and time.   Skin: Skin is warm and dry. No rash noted. He is not diaphoretic. No cyanosis or erythema. Nails show no clubbing.   Psychiatric: He has a normal mood and affect. His behavior is normal. Judgment and thought content normal.   Nursing note and vitals reviewed.      PROCEDURE NOTE:  The right heel wound was cauterized today by applying silver nitrate directly to the wound bed.  The patient tolerated the procedure well.  The wound was then covered with a calcium alginate and a dry dressing.  Assessment:       1. Pressure injury of right heel, stage 3    2. Decubitus ulcer of right hip, stage 2    3. Paraplegia        Plan:           Cleanse wounds with wound cleanser.  Apply skin prep to periwound area.  Apply aquacel foam border dressings to  "right hip ulcers.  Apply calcium alginate to right heel ulcer,  with cotton gauze and secure with roll gauze.  One 4" ace wrap to right foot and ankle.  Place cotton in between toes.  Flexnet to secure dressing.  Keep pressure off of bony prominences at all times.  Rotate position every 2 hours.  Change dressings three times weekly.  [facility] notified of orders via Epic email. We will ask them to continue to see the patient three times weekly.  Return to clinic in one month.                                                                                                                                        "

## 2019-12-04 NOTE — PATIENT INSTRUCTIONS
Keep your dressing dry.  On the day home health is coming to change your dressing, you may shower with a mild soap such as Dove.  Irrigate the wound with lukewarm water for 5 minutes, then dry thoroughly.  Place a dry bandage over the wound to cover it until the nurse arrives.    Keep pressure off of your heel and bony prominences.

## 2020-01-13 ENCOUNTER — PES CALL (OUTPATIENT)
Dept: ADMINISTRATIVE | Facility: CLINIC | Age: 65
End: 2020-01-13

## 2020-01-29 ENCOUNTER — TELEPHONE (OUTPATIENT)
Dept: WOUND CARE | Facility: CLINIC | Age: 65
End: 2020-01-29

## 2020-01-29 NOTE — TELEPHONE ENCOUNTER
----- Message from Mirna Guzman NP sent at 1/29/2020  6:26 AM CST -----  Contact: Anne-Marie ([place]) @ [phone] or fax   Please let them know he did not make his appointment in January.    Thanks!    ----- Message -----  From: Hortensia Jennings  Sent: 1/28/2020   1:01 PM CST  To: Mirna Guzman NP    Caller is needing patient's last wound care notes for January. Please call or fax # [phone]

## 2020-01-29 NOTE — TELEPHONE ENCOUNTER
Return call and spoke with Anne-Marie - advised that Mr. Contreras rescheduled his appointment for 1/31/2020.  The current wound care orders are from the last visit in December of 2019.  Will forward new orders after follow up office visit on 1/31/2020

## 2020-01-31 ENCOUNTER — OFFICE VISIT (OUTPATIENT)
Dept: WOUND CARE | Facility: CLINIC | Age: 65
End: 2020-01-31
Payer: MEDICARE

## 2020-01-31 ENCOUNTER — LAB VISIT (OUTPATIENT)
Dept: LAB | Facility: HOSPITAL | Age: 65
End: 2020-01-31
Payer: MEDICARE

## 2020-01-31 VITALS
BODY MASS INDEX: 26.37 KG/M2 | TEMPERATURE: 98 F | HEIGHT: 73 IN | SYSTOLIC BLOOD PRESSURE: 125 MMHG | HEART RATE: 67 BPM | WEIGHT: 199 LBS | DIASTOLIC BLOOD PRESSURE: 76 MMHG

## 2020-01-31 DIAGNOSIS — L89.510: ICD-10-CM

## 2020-01-31 DIAGNOSIS — G82.20 PARAPLEGIA: ICD-10-CM

## 2020-01-31 DIAGNOSIS — L89.212: Primary | ICD-10-CM

## 2020-01-31 DIAGNOSIS — L89.613 PRESSURE INJURY OF RIGHT HEEL, STAGE 3: ICD-10-CM

## 2020-01-31 LAB
ALBUMIN SERPL BCP-MCNC: 3.5 G/DL (ref 3.5–5.2)
ALP SERPL-CCNC: 70 U/L (ref 55–135)
ALT SERPL W/O P-5'-P-CCNC: 13 U/L (ref 10–44)
ANION GAP SERPL CALC-SCNC: 11 MMOL/L (ref 8–16)
AST SERPL-CCNC: 16 U/L (ref 10–40)
BASOPHILS # BLD AUTO: 0.08 K/UL (ref 0–0.2)
BASOPHILS NFR BLD: 0.6 % (ref 0–1.9)
BILIRUB SERPL-MCNC: 0.4 MG/DL (ref 0.1–1)
BUN SERPL-MCNC: 10 MG/DL (ref 8–23)
CALCIUM SERPL-MCNC: 9.8 MG/DL (ref 8.7–10.5)
CHLORIDE SERPL-SCNC: 99 MMOL/L (ref 95–110)
CO2 SERPL-SCNC: 31 MMOL/L (ref 23–29)
CREAT SERPL-MCNC: 0.8 MG/DL (ref 0.5–1.4)
CRP SERPL-MCNC: 70.8 MG/L (ref 0–8.2)
DIFFERENTIAL METHOD: ABNORMAL
EOSINOPHIL # BLD AUTO: 0.4 K/UL (ref 0–0.5)
EOSINOPHIL NFR BLD: 3.4 % (ref 0–8)
ERYTHROCYTE [DISTWIDTH] IN BLOOD BY AUTOMATED COUNT: 15.7 % (ref 11.5–14.5)
ERYTHROCYTE [SEDIMENTATION RATE] IN BLOOD BY WESTERGREN METHOD: 23 MM/HR (ref 0–23)
EST. GFR  (AFRICAN AMERICAN): >60 ML/MIN/1.73 M^2
EST. GFR  (NON AFRICAN AMERICAN): >60 ML/MIN/1.73 M^2
GLUCOSE SERPL-MCNC: 93 MG/DL (ref 70–110)
HCT VFR BLD AUTO: 45.5 % (ref 40–54)
HGB BLD-MCNC: 14.2 G/DL (ref 14–18)
IMM GRANULOCYTES # BLD AUTO: 0.06 K/UL (ref 0–0.04)
IMM GRANULOCYTES NFR BLD AUTO: 0.5 % (ref 0–0.5)
LYMPHOCYTES # BLD AUTO: 4.5 K/UL (ref 1–4.8)
LYMPHOCYTES NFR BLD: 36 % (ref 18–48)
MCH RBC QN AUTO: 28.3 PG (ref 27–31)
MCHC RBC AUTO-ENTMCNC: 31.2 G/DL (ref 32–36)
MCV RBC AUTO: 91 FL (ref 82–98)
MONOCYTES # BLD AUTO: 1.4 K/UL (ref 0.3–1)
MONOCYTES NFR BLD: 10.8 % (ref 4–15)
NEUTROPHILS # BLD AUTO: 6.1 K/UL (ref 1.8–7.7)
NEUTROPHILS NFR BLD: 48.7 % (ref 38–73)
NRBC BLD-RTO: 0 /100 WBC
PLATELET # BLD AUTO: 435 K/UL (ref 150–350)
PMV BLD AUTO: 10 FL (ref 9.2–12.9)
POTASSIUM SERPL-SCNC: 3.4 MMOL/L (ref 3.5–5.1)
PREALB SERPL-MCNC: 15 MG/DL (ref 20–43)
PROT SERPL-MCNC: 8.2 G/DL (ref 6–8.4)
RBC # BLD AUTO: 5.02 M/UL (ref 4.6–6.2)
SODIUM SERPL-SCNC: 141 MMOL/L (ref 136–145)
WBC # BLD AUTO: 12.55 K/UL (ref 3.9–12.7)

## 2020-01-31 PROCEDURE — 99999 PR PBB SHADOW E&M-EST. PATIENT-LVL V: CPT | Mod: PBBFAC,,, | Performed by: NURSE PRACTITIONER

## 2020-01-31 PROCEDURE — 85652 RBC SED RATE AUTOMATED: CPT

## 2020-01-31 PROCEDURE — 80053 COMPREHEN METABOLIC PANEL: CPT

## 2020-01-31 PROCEDURE — 99213 PR OFFICE/OUTPT VISIT, EST, LEVL III, 20-29 MIN: ICD-10-PCS | Mod: 25,S$PBB,, | Performed by: NURSE PRACTITIONER

## 2020-01-31 PROCEDURE — 99999 PR PBB SHADOW E&M-EST. PATIENT-LVL V: ICD-10-PCS | Mod: PBBFAC,,, | Performed by: NURSE PRACTITIONER

## 2020-01-31 PROCEDURE — 85025 COMPLETE CBC W/AUTO DIFF WBC: CPT

## 2020-01-31 PROCEDURE — 99215 OFFICE O/P EST HI 40 MIN: CPT | Mod: PBBFAC | Performed by: NURSE PRACTITIONER

## 2020-01-31 PROCEDURE — 36415 COLL VENOUS BLD VENIPUNCTURE: CPT

## 2020-01-31 PROCEDURE — 84134 ASSAY OF PREALBUMIN: CPT

## 2020-01-31 PROCEDURE — 99213 OFFICE O/P EST LOW 20 MIN: CPT | Mod: 25,S$PBB,, | Performed by: NURSE PRACTITIONER

## 2020-01-31 PROCEDURE — 86140 C-REACTIVE PROTEIN: CPT

## 2020-01-31 NOTE — PROGRESS NOTES
Subjective:       Patient ID: Maciel Contreras is a 64 y.o. male.    Chief Complaint: Wound Check    Wound Check       This patient is seen today for reevaluation of recurrent decubiti to the right buttock, testicle, right ankle and right heel.  A foam border dressing is on the buttock, right heel and testicular wounds.  He has recurrent new wounds to the right ankle.  He is paraplegic and these wounds are all related to pressure.  He has home health services three times a week through [facility].  His medical history is significant for paraplegia.  He is afebrile. He denies increased redness or purulent drainage but does have increased edema.  His pain level is 4/10.   Review of Systems    Unchanged from prior visit.  Objective:      Physical Exam   Constitutional: He is oriented to person, place, and time. He appears well-developed and well-nourished. No distress.   HENT:   Head: Normocephalic and atraumatic.   Musculoskeletal: He exhibits no edema or tenderness.        Legs:       Feet:    Patient is paraplegic and unable to voluntarily move lower extremities.   Neurological: He is alert and oriented to person, place, and time.   Skin: Skin is warm and dry. No rash noted. He is not diaphoretic. No cyanosis or erythema. Nails show no clubbing.   Psychiatric: He has a normal mood and affect. His behavior is normal. Judgment and thought content normal.   Nursing note and vitals reviewed.      Right anterior ankle    Right lateral ankle pre debridement    Right heel    Right buttock    Ulcers base of testicles      Procedure: Maciel was seen in the clinic room and placed in the supine position on the treatment table. Attention was directed to the ulcer which was located on the right lateral ankle, where an wound measuring 3.0x3.1x0.7 cm with undermining from 7 o'clock to 12 o'clock 1.0 cm is noted. The wound was covered with 100% necrotic tissue. The wound was non-tender. The wound was prepped with alcohol.  Utilizing a # 15 scalpel and pickups, I debrided the wound full-thickness through skin and subcutaneous tissue to excise viable and nonviable tissue inside the wound margins. The patient tolerated well. Because of a lack of sensation, anesthesia was not necessary. The wound was flushed with wound . There was minimal bleeding with debridement which was well controlled with direct pressure. The wound was then dressed with medihoney gel. The patient tolerated the procedure well.  Assessment:       1. Decubitus ulcer of right hip, stage 2    2. Pressure injury of right heel, stage 3    3. Paraplegia        Plan:           Xray of right ankle to rule out osteo.  CBC, CMP, ESR, CRP, prealbumin.  Cleanse wounds with wound cleanser.  Apply skin prep to periwound area.  Apply aquacel foam border dressings to right right buttock ulcer.  Apply medihoney gel and aquacel foam border dressing to testicular ulcers.  Apply medihoney gel (or santyl) and aquacel to right ankle ulcers and cover with gauze.  Apply hydrofiber to right heel ulcer,  with cotton gauze and secure with roll gauze.  Two four inch ace wraps to right lower leg.  Change dressings three times weekly.  Place cotton in between toes.  Keep pressure off of bony prominences at all times.  Rotate position every 2 hours.  Change dressings three times weekly.  [facility] notified of orders via Epic email. We will ask them to continue to see the patient three times weekly.  Return to clinic in one month.

## 2020-02-06 ENCOUNTER — HOSPITAL ENCOUNTER (INPATIENT)
Facility: OTHER | Age: 65
LOS: 2 days | Discharge: HOME HEALTH | DRG: 698 | End: 2020-02-09
Attending: EMERGENCY MEDICINE | Admitting: INTERNAL MEDICINE
Payer: MEDICARE

## 2020-02-06 DIAGNOSIS — L89.613 PRESSURE INJURY OF RIGHT HEEL, STAGE 3: ICD-10-CM

## 2020-02-06 DIAGNOSIS — N39.0 RECURRENT UTI (URINARY TRACT INFECTION): ICD-10-CM

## 2020-02-06 DIAGNOSIS — R10.9 FLANK PAIN: ICD-10-CM

## 2020-02-06 DIAGNOSIS — N12 PYELONEPHRITIS: Primary | ICD-10-CM

## 2020-02-06 DIAGNOSIS — L89.510: ICD-10-CM

## 2020-02-06 DIAGNOSIS — G82.20 PARAPLEGIA: ICD-10-CM

## 2020-02-06 LAB
ALBUMIN SERPL BCP-MCNC: 3.9 G/DL (ref 3.5–5.2)
ALP SERPL-CCNC: 75 U/L (ref 55–135)
ALT SERPL W/O P-5'-P-CCNC: 12 U/L (ref 10–44)
ANION GAP SERPL CALC-SCNC: 13 MMOL/L (ref 8–16)
AST SERPL-CCNC: 20 U/L (ref 10–40)
BASOPHILS # BLD AUTO: 0.1 K/UL (ref 0–0.2)
BASOPHILS NFR BLD: 0.7 % (ref 0–1.9)
BILIRUB SERPL-MCNC: 0.2 MG/DL (ref 0.1–1)
BUN SERPL-MCNC: 10 MG/DL (ref 8–23)
CALCIUM SERPL-MCNC: 10.1 MG/DL (ref 8.7–10.5)
CHLORIDE SERPL-SCNC: 99 MMOL/L (ref 95–110)
CO2 SERPL-SCNC: 32 MMOL/L (ref 23–29)
CREAT SERPL-MCNC: 0.9 MG/DL (ref 0.5–1.4)
DIFFERENTIAL METHOD: ABNORMAL
EOSINOPHIL # BLD AUTO: 0.5 K/UL (ref 0–0.5)
EOSINOPHIL NFR BLD: 3.4 % (ref 0–8)
ERYTHROCYTE [DISTWIDTH] IN BLOOD BY AUTOMATED COUNT: 15.9 % (ref 11.5–14.5)
EST. GFR  (AFRICAN AMERICAN): >60 ML/MIN/1.73 M^2
EST. GFR  (NON AFRICAN AMERICAN): >60 ML/MIN/1.73 M^2
GLUCOSE SERPL-MCNC: 91 MG/DL (ref 70–110)
HCT VFR BLD AUTO: 46.2 % (ref 40–54)
HGB BLD-MCNC: 14.7 G/DL (ref 14–18)
IMM GRANULOCYTES # BLD AUTO: 0.05 K/UL (ref 0–0.04)
IMM GRANULOCYTES NFR BLD AUTO: 0.4 % (ref 0–0.5)
LACTATE SERPL-SCNC: 2.3 MMOL/L (ref 0.5–2.2)
LYMPHOCYTES # BLD AUTO: 6 K/UL (ref 1–4.8)
LYMPHOCYTES NFR BLD: 42 % (ref 18–48)
MCH RBC QN AUTO: 28.4 PG (ref 27–31)
MCHC RBC AUTO-ENTMCNC: 31.8 G/DL (ref 32–36)
MCV RBC AUTO: 89 FL (ref 82–98)
MONOCYTES # BLD AUTO: 1.6 K/UL (ref 0.3–1)
MONOCYTES NFR BLD: 11.4 % (ref 4–15)
NEUTROPHILS # BLD AUTO: 6 K/UL (ref 1.8–7.7)
NEUTROPHILS NFR BLD: 42.1 % (ref 38–73)
NRBC BLD-RTO: 0 /100 WBC
PLATELET # BLD AUTO: 438 K/UL (ref 150–350)
PMV BLD AUTO: 9.5 FL (ref 9.2–12.9)
POTASSIUM SERPL-SCNC: 3.4 MMOL/L (ref 3.5–5.1)
PROT SERPL-MCNC: 8.8 G/DL (ref 6–8.4)
RBC # BLD AUTO: 5.17 M/UL (ref 4.6–6.2)
SODIUM SERPL-SCNC: 144 MMOL/L (ref 136–145)
WBC # BLD AUTO: 14.28 K/UL (ref 3.9–12.7)

## 2020-02-06 PROCEDURE — 87106 FUNGI IDENTIFICATION YEAST: CPT

## 2020-02-06 PROCEDURE — 96374 THER/PROPH/DIAG INJ IV PUSH: CPT

## 2020-02-06 PROCEDURE — 87040 BLOOD CULTURE FOR BACTERIA: CPT

## 2020-02-06 PROCEDURE — 87086 URINE CULTURE/COLONY COUNT: CPT

## 2020-02-06 PROCEDURE — 85025 COMPLETE CBC W/AUTO DIFF WBC: CPT

## 2020-02-06 PROCEDURE — 99285 EMERGENCY DEPT VISIT HI MDM: CPT | Mod: 25

## 2020-02-06 PROCEDURE — 96375 TX/PRO/DX INJ NEW DRUG ADDON: CPT

## 2020-02-06 PROCEDURE — 81000 URINALYSIS NONAUTO W/SCOPE: CPT

## 2020-02-06 PROCEDURE — 87088 URINE BACTERIA CULTURE: CPT

## 2020-02-06 PROCEDURE — 83605 ASSAY OF LACTIC ACID: CPT

## 2020-02-06 PROCEDURE — 80053 COMPREHEN METABOLIC PANEL: CPT

## 2020-02-06 PROCEDURE — 63600175 PHARM REV CODE 636 W HCPCS: Performed by: EMERGENCY MEDICINE

## 2020-02-06 RX ORDER — FAMOTIDINE 20 MG/1
20 TABLET, FILM COATED ORAL 2 TIMES DAILY
Status: ON HOLD | COMMUNITY
End: 2020-02-09 | Stop reason: HOSPADM

## 2020-02-06 RX ORDER — SODIUM CHLORIDE 9 MG/ML
1000 INJECTION, SOLUTION INTRAVENOUS
Status: COMPLETED | OUTPATIENT
Start: 2020-02-06 | End: 2020-02-06

## 2020-02-06 RX ADMIN — CEFTRIAXONE 1 G: 1 INJECTION, SOLUTION INTRAVENOUS at 11:02

## 2020-02-06 RX ADMIN — SODIUM CHLORIDE 1000 ML: 0.9 INJECTION, SOLUTION INTRAVENOUS at 11:02

## 2020-02-07 PROBLEM — N12 PYELONEPHRITIS: Status: ACTIVE | Noted: 2020-02-07

## 2020-02-07 PROBLEM — L89.159 PRESSURE INJURY OF SKIN OF SACRAL REGION: Status: ACTIVE | Noted: 2020-02-07

## 2020-02-07 PROBLEM — K59.00 CONSTIPATION: Status: ACTIVE | Noted: 2020-02-07

## 2020-02-07 PROBLEM — L89.504: Status: ACTIVE | Noted: 2020-02-07

## 2020-02-07 PROBLEM — N39.0 RECURRENT UTI (URINARY TRACT INFECTION): Status: ACTIVE | Noted: 2020-02-07

## 2020-02-07 PROBLEM — M54.6 ACUTE RIGHT-SIDED THORACIC BACK PAIN: Status: ACTIVE | Noted: 2020-02-07

## 2020-02-07 PROBLEM — L89.603 PRESSURE INJURY OF HEEL, STAGE 3: Status: ACTIVE | Noted: 2020-02-07

## 2020-02-07 PROBLEM — A41.9 SEPSIS: Status: ACTIVE | Noted: 2020-02-07

## 2020-02-07 LAB
ANION GAP SERPL CALC-SCNC: 10 MMOL/L (ref 8–16)
BACTERIA #/AREA URNS HPF: ABNORMAL /HPF
BASOPHILS # BLD AUTO: 0.08 K/UL (ref 0–0.2)
BASOPHILS NFR BLD: 0.7 % (ref 0–1.9)
BILIRUB UR QL STRIP: NEGATIVE
BUN SERPL-MCNC: 10 MG/DL (ref 8–23)
CALCIUM SERPL-MCNC: 8.7 MG/DL (ref 8.7–10.5)
CHLORIDE SERPL-SCNC: 105 MMOL/L (ref 95–110)
CLARITY UR: ABNORMAL
CO2 SERPL-SCNC: 27 MMOL/L (ref 23–29)
COLOR UR: YELLOW
CREAT SERPL-MCNC: 0.7 MG/DL (ref 0.5–1.4)
DIFFERENTIAL METHOD: ABNORMAL
EOSINOPHIL # BLD AUTO: 0.4 K/UL (ref 0–0.5)
EOSINOPHIL NFR BLD: 3.8 % (ref 0–8)
ERYTHROCYTE [DISTWIDTH] IN BLOOD BY AUTOMATED COUNT: 15.8 % (ref 11.5–14.5)
EST. GFR  (AFRICAN AMERICAN): >60 ML/MIN/1.73 M^2
EST. GFR  (NON AFRICAN AMERICAN): >60 ML/MIN/1.73 M^2
GLUCOSE SERPL-MCNC: 82 MG/DL (ref 70–110)
GLUCOSE UR QL STRIP: NEGATIVE
HCT VFR BLD AUTO: 42.9 % (ref 40–54)
HGB BLD-MCNC: 13.4 G/DL (ref 14–18)
HGB UR QL STRIP: ABNORMAL
IMM GRANULOCYTES # BLD AUTO: 0.03 K/UL (ref 0–0.04)
IMM GRANULOCYTES NFR BLD AUTO: 0.3 % (ref 0–0.5)
KETONES UR QL STRIP: NEGATIVE
LACTATE SERPL-SCNC: 1.7 MMOL/L (ref 0.5–2.2)
LEUKOCYTE ESTERASE UR QL STRIP: ABNORMAL
LYMPHOCYTES # BLD AUTO: 3.1 K/UL (ref 1–4.8)
LYMPHOCYTES NFR BLD: 27.2 % (ref 18–48)
MCH RBC QN AUTO: 28 PG (ref 27–31)
MCHC RBC AUTO-ENTMCNC: 31.2 G/DL (ref 32–36)
MCV RBC AUTO: 90 FL (ref 82–98)
MICROSCOPIC COMMENT: ABNORMAL
MONOCYTES # BLD AUTO: 1 K/UL (ref 0.3–1)
MONOCYTES NFR BLD: 8.9 % (ref 4–15)
NEUTROPHILS # BLD AUTO: 6.8 K/UL (ref 1.8–7.7)
NEUTROPHILS NFR BLD: 59.1 % (ref 38–73)
NITRITE UR QL STRIP: NEGATIVE
NON-SQ EPI CELLS #/AREA URNS HPF: 4 /HPF
NRBC BLD-RTO: 0 /100 WBC
PH UR STRIP: 7 [PH] (ref 5–8)
PLATELET # BLD AUTO: 437 K/UL (ref 150–350)
PMV BLD AUTO: 9.5 FL (ref 9.2–12.9)
POTASSIUM SERPL-SCNC: 3.3 MMOL/L (ref 3.5–5.1)
PROT UR QL STRIP: ABNORMAL
RBC # BLD AUTO: 4.79 M/UL (ref 4.6–6.2)
RBC #/AREA URNS HPF: 19 /HPF (ref 0–4)
SODIUM SERPL-SCNC: 142 MMOL/L (ref 136–145)
SP GR UR STRIP: 1.01 (ref 1–1.03)
SQUAMOUS #/AREA URNS HPF: 9 /HPF
URN SPEC COLLECT METH UR: ABNORMAL
UROBILINOGEN UR STRIP-ACNC: 1 EU/DL
WBC # BLD AUTO: 11.41 K/UL (ref 3.9–12.7)
WBC #/AREA URNS HPF: >100 /HPF (ref 0–5)
YEAST URNS QL MICRO: ABNORMAL

## 2020-02-07 PROCEDURE — 97162 PT EVAL MOD COMPLEX 30 MIN: CPT

## 2020-02-07 PROCEDURE — 25000003 PHARM REV CODE 250: Performed by: PHYSICIAN ASSISTANT

## 2020-02-07 PROCEDURE — 80048 BASIC METABOLIC PNL TOTAL CA: CPT

## 2020-02-07 PROCEDURE — 25000003 PHARM REV CODE 250: Performed by: EMERGENCY MEDICINE

## 2020-02-07 PROCEDURE — 11000001 HC ACUTE MED/SURG PRIVATE ROOM

## 2020-02-07 PROCEDURE — 99223 PR INITIAL HOSPITAL CARE,LEVL III: ICD-10-PCS | Mod: AI,,, | Performed by: INTERNAL MEDICINE

## 2020-02-07 PROCEDURE — 63600175 PHARM REV CODE 636 W HCPCS: Performed by: EMERGENCY MEDICINE

## 2020-02-07 PROCEDURE — 99223 1ST HOSP IP/OBS HIGH 75: CPT | Mod: AI,,, | Performed by: INTERNAL MEDICINE

## 2020-02-07 PROCEDURE — 97530 THERAPEUTIC ACTIVITIES: CPT

## 2020-02-07 PROCEDURE — 97165 OT EVAL LOW COMPLEX 30 MIN: CPT

## 2020-02-07 PROCEDURE — 83605 ASSAY OF LACTIC ACID: CPT

## 2020-02-07 PROCEDURE — 63600175 PHARM REV CODE 636 W HCPCS: Performed by: PHYSICIAN ASSISTANT

## 2020-02-07 PROCEDURE — 85025 COMPLETE CBC W/AUTO DIFF WBC: CPT

## 2020-02-07 PROCEDURE — 36415 COLL VENOUS BLD VENIPUNCTURE: CPT

## 2020-02-07 PROCEDURE — 94761 N-INVAS EAR/PLS OXIMETRY MLT: CPT

## 2020-02-07 RX ORDER — ATENOLOL 25 MG/1
100 TABLET ORAL DAILY
Status: DISCONTINUED | OUTPATIENT
Start: 2020-02-07 | End: 2020-02-09 | Stop reason: HOSPADM

## 2020-02-07 RX ORDER — SODIUM CHLORIDE 9 MG/ML
INJECTION, SOLUTION INTRAVENOUS CONTINUOUS
Status: DISCONTINUED | OUTPATIENT
Start: 2020-02-07 | End: 2020-02-09

## 2020-02-07 RX ORDER — SYRING-NEEDL,DISP,INSUL,0.3 ML 29 G X1/2"
296 SYRINGE, EMPTY DISPOSABLE MISCELLANEOUS ONCE
Status: COMPLETED | OUTPATIENT
Start: 2020-02-07 | End: 2020-02-07

## 2020-02-07 RX ORDER — LORAZEPAM 1 MG/1
2 TABLET ORAL 2 TIMES DAILY
Status: DISCONTINUED | OUTPATIENT
Start: 2020-02-07 | End: 2020-02-09 | Stop reason: HOSPADM

## 2020-02-07 RX ORDER — ONDANSETRON 2 MG/ML
4 INJECTION INTRAMUSCULAR; INTRAVENOUS
Status: COMPLETED | OUTPATIENT
Start: 2020-02-07 | End: 2020-02-07

## 2020-02-07 RX ORDER — LORAZEPAM 1 MG/1
2 TABLET ORAL
Status: COMPLETED | OUTPATIENT
Start: 2020-02-07 | End: 2020-02-07

## 2020-02-07 RX ORDER — TRAMADOL HYDROCHLORIDE 50 MG/1
50 TABLET ORAL EVERY 6 HOURS PRN
Status: DISCONTINUED | OUTPATIENT
Start: 2020-02-07 | End: 2020-02-09 | Stop reason: HOSPADM

## 2020-02-07 RX ORDER — ACETAMINOPHEN 325 MG/1
650 TABLET ORAL EVERY 4 HOURS PRN
Status: DISCONTINUED | OUTPATIENT
Start: 2020-02-07 | End: 2020-02-09 | Stop reason: HOSPADM

## 2020-02-07 RX ORDER — HEPARIN SODIUM 5000 [USP'U]/ML
5000 INJECTION, SOLUTION INTRAVENOUS; SUBCUTANEOUS EVERY 8 HOURS
Status: DISCONTINUED | OUTPATIENT
Start: 2020-02-07 | End: 2020-02-09 | Stop reason: HOSPADM

## 2020-02-07 RX ORDER — OXYCODONE AND ACETAMINOPHEN 10; 325 MG/1; MG/1
1 TABLET ORAL 3 TIMES DAILY PRN
Status: DISCONTINUED | OUTPATIENT
Start: 2020-02-07 | End: 2020-02-09 | Stop reason: HOSPADM

## 2020-02-07 RX ORDER — SODIUM CHLORIDE 0.9 % (FLUSH) 0.9 %
10 SYRINGE (ML) INJECTION
Status: DISCONTINUED | OUTPATIENT
Start: 2020-02-07 | End: 2020-02-09 | Stop reason: HOSPADM

## 2020-02-07 RX ORDER — MECLIZINE HYDROCHLORIDE 25 MG/1
25 TABLET ORAL 3 TIMES DAILY PRN
Status: DISCONTINUED | OUTPATIENT
Start: 2020-02-07 | End: 2020-02-09 | Stop reason: HOSPADM

## 2020-02-07 RX ORDER — ONDANSETRON 8 MG/1
8 TABLET, ORALLY DISINTEGRATING ORAL EVERY 8 HOURS PRN
Status: DISCONTINUED | OUTPATIENT
Start: 2020-02-07 | End: 2020-02-09 | Stop reason: HOSPADM

## 2020-02-07 RX ORDER — PSEUDOEPHEDRINE/ACETAMINOPHEN 30MG-500MG
TABLET ORAL ONCE
Status: COMPLETED | OUTPATIENT
Start: 2020-02-07 | End: 2020-02-07

## 2020-02-07 RX ORDER — CARISOPRODOL 350 MG/1
350 TABLET ORAL 3 TIMES DAILY PRN
Status: DISCONTINUED | OUTPATIENT
Start: 2020-02-07 | End: 2020-02-09 | Stop reason: HOSPADM

## 2020-02-07 RX ORDER — FAMOTIDINE 20 MG/1
20 TABLET, FILM COATED ORAL 2 TIMES DAILY
Status: DISCONTINUED | OUTPATIENT
Start: 2020-02-07 | End: 2020-02-09 | Stop reason: HOSPADM

## 2020-02-07 RX ORDER — TALC
9 POWDER (GRAM) TOPICAL NIGHTLY
Status: DISCONTINUED | OUTPATIENT
Start: 2020-02-07 | End: 2020-02-09 | Stop reason: HOSPADM

## 2020-02-07 RX ORDER — PSEUDOEPHEDRINE/ACETAMINOPHEN 30MG-500MG
TABLET ORAL ONCE
Status: DISCONTINUED | OUTPATIENT
Start: 2020-02-07 | End: 2020-02-07

## 2020-02-07 RX ADMIN — HEPARIN SODIUM 5000 UNITS: 5000 INJECTION, SOLUTION INTRAVENOUS; SUBCUTANEOUS at 03:02

## 2020-02-07 RX ADMIN — OXYCODONE HYDROCHLORIDE AND ACETAMINOPHEN 1 TABLET: 10; 325 TABLET ORAL at 09:02

## 2020-02-07 RX ADMIN — ONDANSETRON 4 MG: 2 INJECTION INTRAMUSCULAR; INTRAVENOUS at 12:02

## 2020-02-07 RX ADMIN — LORAZEPAM 2 MG: 1 TABLET ORAL at 09:02

## 2020-02-07 RX ADMIN — Medication 9 MG: at 09:02

## 2020-02-07 RX ADMIN — ATENOLOL 100 MG: 25 TABLET ORAL at 09:02

## 2020-02-07 RX ADMIN — Medication 9 MG: at 01:02

## 2020-02-07 RX ADMIN — COLLAGENASE SANTYL: 250 OINTMENT TOPICAL at 09:02

## 2020-02-07 RX ADMIN — HEPARIN SODIUM 5000 UNITS: 5000 INJECTION, SOLUTION INTRAVENOUS; SUBCUTANEOUS at 09:02

## 2020-02-07 RX ADMIN — Medication: at 04:02

## 2020-02-07 RX ADMIN — LORAZEPAM 2 MG: 1 TABLET ORAL at 12:02

## 2020-02-07 RX ADMIN — FAMOTIDINE 20 MG: 20 TABLET ORAL at 09:02

## 2020-02-07 RX ADMIN — OXYCODONE HYDROCHLORIDE AND ACETAMINOPHEN 1 TABLET: 10; 325 TABLET ORAL at 01:02

## 2020-02-07 RX ADMIN — HEPARIN SODIUM 5000 UNITS: 5000 INJECTION, SOLUTION INTRAVENOUS; SUBCUTANEOUS at 06:02

## 2020-02-07 RX ADMIN — SODIUM CHLORIDE: 0.9 INJECTION, SOLUTION INTRAVENOUS at 01:02

## 2020-02-07 RX ADMIN — ONDANSETRON 8 MG: 8 TABLET, ORALLY DISINTEGRATING ORAL at 11:02

## 2020-02-07 RX ADMIN — CEFTRIAXONE 1 G: 1 INJECTION, SOLUTION INTRAVENOUS at 11:02

## 2020-02-07 RX ADMIN — MAGNESIUM CITRATE 296 ML: 1.75 LIQUID ORAL at 04:02

## 2020-02-07 NOTE — PLAN OF CARE
SW verified PCP and pt uses [facility] on [address] and would like bedside delivery.  Pt doesn't have a POA or LW.  Pt has [facility]) and has WC and grab bars.  Pt's son will provide transportation home.  Pt requested transfer board and TTB, agreed to be billed $75.       02/07/20 1233   Discharge Assessment   Assessment Type Discharge Planning Assessment   Confirmed/corrected address and phone number on facesheet? Yes   Assessment information obtained from? Patient   Communicated expected length of stay with patient/caregiver no   Prior to hospitilization cognitive status: Alert/Oriented   Prior to hospitalization functional status: Assistive Equipment;Needs Assistance   Current cognitive status: Alert/Oriented   Current Functional Status: Assistive Equipment;Needs Assistance   Lives With child(david), adult   Able to Return to Prior Arrangements yes   Is patient able to care for self after discharge? Unable to determine at this time (comments)   Who are your caregiver(s) and their phone number(s)? [email or web address] Vidya and sonJoe   Readmission Within the Last 30 Days no previous admission in last 30 days   Patient currently being followed by outpatient case management? No   Patient currently receives any other outside agency services? Yes   Name and contact number of agency or person providing outside services [facility]  ([facility])   Equipment Currently Used at Home wheelchair;grab bar   Do you have any problems affording any of your prescribed medications? No   Is the patient taking medications as prescribed? yes   Does the patient have transportation home? Yes   Transportation Anticipated family or friend will provide   Discharge Plan A Home Health   DME Needed Upon Discharge  bath bench;other (see comments)  (transfer board)
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
## 2020-02-07 NOTE — ED TRIAGE NOTES
Patient presents to ED for evaluation of intermittent right flank pain; currently on abx for UTI.
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
## 2020-02-07 NOTE — ASSESSMENT & PLAN NOTE
- no pain elicited at time of exam  - patient states this is positional and exacerbated w/ movement   - PRN pain meds

## 2020-02-07 NOTE — PLAN OF CARE
Problem: Physical Therapy Goal  Goal: Physical Therapy Goal  Description  Goals to be met by: 3/7/2020    Patient will perform the following to increase strength, improve mobility, and return to prior level of function:    1. Supine <> sit with SBA.  2. Bed <> wheelchair transfer with SBA.  3. Wheelchair mobility x 150 ft with mod I   Outcome: Ongoing, Progressing     PT orders received. PT evaluation completed and goals/POC established. Pt tolerated evaluation well with no adverse reactions. Pt declined transfer to wheelchair this session due to pain. Performed supine > long sit and scooting in bed with CGA. Pt will benefit from skilled PT services to address impairments and functional limitations. Recommend discharge to home with HHPT/OT.

## 2020-02-07 NOTE — PLAN OF CARE
POC reviewed with pt who verbalized understanding. AAOx4. VSS on RA. Remains free of falls and injury. IVF infusing throughout shift. Meng in place. Wound care consult placed. Turned Q2. Pain managed with PRN meds per MAR. No acute events. No distress noted. Will continue to monitor.

## 2020-02-07 NOTE — PLAN OF CARE
02/07/20 1135   Medicare Message   Important Message from Medicare regarding Discharge Appeal Rights Given to patient/caregiver;Explained to patient/caregiver;Signed/date by patient/caregiver   Date IMM was signed 02/07/20   Time IMM was signed 111
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
## 2020-02-07 NOTE — ASSESSMENT & PLAN NOTE
"- as above in "Sepsis"  - states he usually is on prophylactic Macrobid, but he had stopped taking it "for a while" but then started again when odor became foul and was later started on Augmentin on 2/4/20  "
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
## 2020-02-07 NOTE — PLAN OF CARE
Problem: Occupational Therapy Goal  Goal: Occupational Therapy Goal  Description  Goals to be met by: 2/14/20    Patient will increase functional independence with ADLs by performing:    ADL transfers at SBA level.  LB dressing at Set up level.                Outcome: Ongoing, Progressing   Evaluation complete and goals established.  Anticipate discharge to home with assist and Home Health OT and PT.

## 2020-02-07 NOTE — PLAN OF CARE
CM met with patient and notified him that TTB was $81.50  and the sliding board he requested was covered by insurance per [employer], [facility]. Patient requested to have equipment to be delivered to his home. Patient agreeable to pay for TTB.     CM contacted [employer], [facility] and notified that patient requests DME to be delivered to home and agrees to pay for TTB.     ADRIEN Figueroa stated she would contact patient to collect payment and set up delivery.

## 2020-02-07 NOTE — NURSING TRANSFER
Nursing Transfer Note      2/7/2020     Transfer from ER to 315    Transfer via stretcher    Transfer with pt belongings; charted in navigators - denies the need to have anything locked up with security.     Transported by tech    Medicines sent: na    Chart send with patient: yes    Notified: son at bedside    Patient reassessed at:2/7/2020 @0101    Upon arrival to floor: AAOx4. VSS. Oriented to room. Bed in lowest and locked position. Call light in reach. Will continue to monitor.

## 2020-02-07 NOTE — ASSESSMENT & PLAN NOTE
- hypertensive at present  - continue atenolol 100 mg qd  - hold diuretics while lactic acid elevated

## 2020-02-07 NOTE — CONSULTS
Consulted for pressure injuries present on admission on 64 year old male with paraplegia.  The patient is pleasant, very informed regarding the stages of healing of his wounds and the treatment plan.  He has a roho cushion for his wheel chair, but has spent more time in bed in the last few weeks.  The patient feels the stage 4 pressure injury on the right malleolus and the stage 3 pressure injury on the right anterior ankle is from crossing his left leg over the right while in bed.  The Pressure injury on the right heel is a stage 3.  In additon the patient has a resurfaced pressure injury on both the buttocks and right hip.  These are protected with bordered foam.  There is a stage 3 pressure injury on the tissue between the scrotum and the anus which may be from shearing and pressure.  Recommend continue to protect the resurfaced wounds on the hip and buttocks with bordered foam; Promote moist wound healing of the perineal wound with bordered foam dressing q 2-3 days; Collagenase santyl daily to the right lateral malleolus and the right anterior ankle with bordered foam dressing, and Bordered foam dressing to the right heel every day.  The patient is 6'1 and bed extender is recommended.  Also recommend floating heels off bed with pillows.  The patient has foot drop on the right and a heel boot is contraindicated with foot drop.  Scar tissue on buttock and stage 3 in perineal area:    3 x 3.5 x 0.3 right heel:    Right lateral malleolus 3.2 x 4.0 x slough/tendon:    Right anterior ankle, 1 x 1.3 x 0.3 cm:

## 2020-02-07 NOTE — ASSESSMENT & PLAN NOTE
- Mr. Maciel Contreras is admitted to inpatient status  - source: urine   - urine cultures pending   - was taking chronic Macrobid at home, just started Augmentin as outpatient   - normally self-caths, but has had indwelling garcia for a few days, and urine became foul smelling  - lactic acid minorly elevated @ 2.3, trend    - continue IV ABX & IV fluids   - WBC 14K
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
## 2020-02-08 LAB
ANION GAP SERPL CALC-SCNC: 9 MMOL/L (ref 8–16)
ANISOCYTOSIS BLD QL SMEAR: SLIGHT
BACTERIA UR CULT: ABNORMAL
BASOPHILS # BLD AUTO: ABNORMAL K/UL (ref 0–0.2)
BASOPHILS NFR BLD: 1 % (ref 0–1.9)
BUN SERPL-MCNC: 8 MG/DL (ref 8–23)
CALCIUM SERPL-MCNC: 8.5 MG/DL (ref 8.7–10.5)
CHLORIDE SERPL-SCNC: 108 MMOL/L (ref 95–110)
CO2 SERPL-SCNC: 26 MMOL/L (ref 23–29)
CREAT SERPL-MCNC: 0.7 MG/DL (ref 0.5–1.4)
DIFFERENTIAL METHOD: ABNORMAL
EOSINOPHIL # BLD AUTO: ABNORMAL K/UL (ref 0–0.5)
EOSINOPHIL NFR BLD: 3 % (ref 0–8)
ERYTHROCYTE [DISTWIDTH] IN BLOOD BY AUTOMATED COUNT: 15.9 % (ref 11.5–14.5)
EST. GFR  (AFRICAN AMERICAN): >60 ML/MIN/1.73 M^2
EST. GFR  (NON AFRICAN AMERICAN): >60 ML/MIN/1.73 M^2
GLUCOSE SERPL-MCNC: 76 MG/DL (ref 70–110)
HCT VFR BLD AUTO: 37.6 % (ref 40–54)
HGB BLD-MCNC: 12.1 G/DL (ref 14–18)
HYPOCHROMIA BLD QL SMEAR: ABNORMAL
IMM GRANULOCYTES # BLD AUTO: ABNORMAL K/UL (ref 0–0.04)
IMM GRANULOCYTES NFR BLD AUTO: ABNORMAL % (ref 0–0.5)
LYMPHOCYTES # BLD AUTO: ABNORMAL K/UL (ref 1–4.8)
LYMPHOCYTES NFR BLD: 34 % (ref 18–48)
MCH RBC QN AUTO: 28.4 PG (ref 27–31)
MCHC RBC AUTO-ENTMCNC: 32.2 G/DL (ref 32–36)
MCV RBC AUTO: 88 FL (ref 82–98)
MONOCYTES # BLD AUTO: ABNORMAL K/UL (ref 0.3–1)
MONOCYTES NFR BLD: 8 % (ref 4–15)
NEUTROPHILS NFR BLD: 53 % (ref 38–73)
NEUTS BAND NFR BLD MANUAL: 1 %
NRBC BLD-RTO: 0 /100 WBC
PLATELET # BLD AUTO: 391 K/UL (ref 150–350)
PMV BLD AUTO: 10.2 FL (ref 9.2–12.9)
POTASSIUM SERPL-SCNC: 3.6 MMOL/L (ref 3.5–5.1)
RBC # BLD AUTO: 4.26 M/UL (ref 4.6–6.2)
SODIUM SERPL-SCNC: 143 MMOL/L (ref 136–145)
WBC # BLD AUTO: 13.85 K/UL (ref 3.9–12.7)

## 2020-02-08 PROCEDURE — 85007 BL SMEAR W/DIFF WBC COUNT: CPT

## 2020-02-08 PROCEDURE — 94761 N-INVAS EAR/PLS OXIMETRY MLT: CPT

## 2020-02-08 PROCEDURE — 25000003 PHARM REV CODE 250: Performed by: INTERNAL MEDICINE

## 2020-02-08 PROCEDURE — 63600175 PHARM REV CODE 636 W HCPCS: Performed by: PHYSICIAN ASSISTANT

## 2020-02-08 PROCEDURE — 99233 SBSQ HOSP IP/OBS HIGH 50: CPT | Mod: ,,, | Performed by: INTERNAL MEDICINE

## 2020-02-08 PROCEDURE — 99233 PR SUBSEQUENT HOSPITAL CARE,LEVL III: ICD-10-PCS | Mod: ,,, | Performed by: INTERNAL MEDICINE

## 2020-02-08 PROCEDURE — 11000001 HC ACUTE MED/SURG PRIVATE ROOM

## 2020-02-08 PROCEDURE — 25000003 PHARM REV CODE 250: Performed by: PHYSICIAN ASSISTANT

## 2020-02-08 PROCEDURE — 36415 COLL VENOUS BLD VENIPUNCTURE: CPT

## 2020-02-08 PROCEDURE — 85027 COMPLETE CBC AUTOMATED: CPT

## 2020-02-08 PROCEDURE — 80048 BASIC METABOLIC PNL TOTAL CA: CPT

## 2020-02-08 RX ORDER — FLUCONAZOLE 100 MG/1
200 TABLET ORAL DAILY
Status: DISCONTINUED | OUTPATIENT
Start: 2020-02-08 | End: 2020-02-09 | Stop reason: HOSPADM

## 2020-02-08 RX ADMIN — OXYCODONE HYDROCHLORIDE AND ACETAMINOPHEN 1 TABLET: 10; 325 TABLET ORAL at 09:02

## 2020-02-08 RX ADMIN — ATENOLOL 100 MG: 25 TABLET ORAL at 09:02

## 2020-02-08 RX ADMIN — SODIUM CHLORIDE: 0.9 INJECTION, SOLUTION INTRAVENOUS at 10:02

## 2020-02-08 RX ADMIN — Medication 9 MG: at 10:02

## 2020-02-08 RX ADMIN — LORAZEPAM 2 MG: 1 TABLET ORAL at 09:02

## 2020-02-08 RX ADMIN — OXYCODONE HYDROCHLORIDE AND ACETAMINOPHEN 1 TABLET: 10; 325 TABLET ORAL at 05:02

## 2020-02-08 RX ADMIN — FAMOTIDINE 20 MG: 20 TABLET ORAL at 10:02

## 2020-02-08 RX ADMIN — FAMOTIDINE 20 MG: 20 TABLET ORAL at 09:02

## 2020-02-08 RX ADMIN — FLUCONAZOLE 200 MG: 100 TABLET ORAL at 04:02

## 2020-02-08 RX ADMIN — LORAZEPAM 2 MG: 1 TABLET ORAL at 10:02

## 2020-02-08 NOTE — SUBJECTIVE & OBJECTIVE
Interval History: No acute events overnight. Feeling stable. No new concerns at this time.    Review of Systems   Constitutional: Negative for chills and fever.   Respiratory: Negative for cough and shortness of breath.    Cardiovascular: Negative for chest pain and palpitations.   Gastrointestinal: Negative for abdominal pain, nausea and vomiting.     Objective:     Vital Signs (Most Recent):  Temp: 97.5 °F (36.4 °C) (02/08/20 1311)  Pulse: 62 (02/08/20 1311)  Resp: 18 (02/08/20 1311)  BP: (!) 110/57 (02/08/20 1311)  SpO2: 96 % (02/08/20 1311) Vital Signs (24h Range):  Temp:  [97.5 °F (36.4 °C)-98.7 °F (37.1 °C)] 97.5 °F (36.4 °C)  Pulse:  [62-78] 62  Resp:  [18-19] 18  SpO2:  [95 %-98 %] 96 %  BP: ()/(52-73) 110/57     Weight: 98.2 kg (216 lb 7.9 oz)  Body mass index is 28.56 kg/m².    Intake/Output Summary (Last 24 hours) at 2/8/2020 1516  Last data filed at 2/8/2020 0700  Gross per 24 hour   Intake --   Output 1200 ml   Net -1200 ml      Physical Exam   Constitutional: He is oriented to person, place, and time. He appears well-developed. No distress.   HENT:   Head: Normocephalic and atraumatic.   Eyes: Conjunctivae and EOM are normal.   Cardiovascular: Normal rate and intact distal pulses.   Pulmonary/Chest: Effort normal. No respiratory distress.   Abdominal: Soft. There is no tenderness.   Musculoskeletal: Normal range of motion. He exhibits no edema.   Neurological: He is alert and oriented to person, place, and time.   Skin: Skin is warm and dry.   Nursing note and vitals reviewed.    Significant Labs:   CBC:  Recent Labs   Lab 02/06/20  2213 02/07/20  0140 02/08/20  0401   WBC 14.28* 11.41 13.85*   HGB 14.7 13.4* 12.1*   HCT 46.2 42.9 37.6*   * 437* 391*   GRAN 42.1  6.0 59.1  6.8 53.0   LYMPH 42.0  6.0* 27.2  3.1 34.0  CANCELED   MONO 11.4  1.6* 8.9  1.0 8.0  CANCELED   EOS 0.5 0.4 CANCELED   BASO 0.10 0.08 CANCELED      BMP:  Recent Labs   Lab 02/06/20  2213 02/07/20  0140  02/08/20  0401    142 143   K 3.4* 3.3* 3.6   CL 99 105 108   CO2 32* 27 26   BUN 10 10 8   CREATININE 0.9 0.7 0.7   GLU 91 82 76   CALCIUM 10.1 8.7 8.5*     Significant Imaging:   No new imaging this morning.

## 2020-02-08 NOTE — ASSESSMENT & PLAN NOTE
- Continuing carisoprodol 350mg PO TID PRN, tramadol 50mg PO q6hr PRN, oxycodone-acetaminophen 10-325mg PO TID PRN.

## 2020-02-08 NOTE — HOSPITAL COURSE
Meng exchanged in ED. Admitted and continued on ceftriaxone IV. Urine culture demonstrated candida and he was converted to fluconazole for 14 day course. Blood cultures remained negative. Wound care was consulted to address his chronic wounds. He improved clinically and vitals were stable; he was subsequently prepared for discharge. Close follow-up with his PCP was recommended to discuss prophylactic antibiotics for UTIs in the future.

## 2020-02-08 NOTE — ASSESSMENT & PLAN NOTE
- Sepsis 2/2 UTI with recurrent urinary tract infections prior.  - Had been on nitrofurantoin prophylactically, then amoxicillin-clavulanate subsequently.  - Continued on ceftriaxone after admission.  - Urine culture demonstrating candida; given indwelling garcia (exchanged in ED) and symptoms, will treat with fluconazole 200mg PO daily for 14 day total course.

## 2020-02-08 NOTE — PLAN OF CARE
Problem: Adult Inpatient Plan of Care  Goal: Plan of Care Review  Outcome: Ongoing, Progressing  Flowsheets (Taken 2/8/2020 0604)  Plan of Care Reviewed With: patient     Problem: Adult Inpatient Plan of Care  Goal: Absence of Hospital-Acquired Illness or Injury  Intervention: Identify and Manage Fall Risk  Flowsheets (Taken 2/8/2020 0604)  Safety Promotion/Fall Prevention: assistive device/personal item within reach; diversional activities provided; side rails raised x 2     Problem: Wound  Goal: Optimal Wound Healing  Intervention: Promote Effective Wound Healing  Flowsheets (Taken 2/8/2020 0604)  Sleep/Rest Enhancement: regular sleep/rest pattern promoted
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
## 2020-02-08 NOTE — PT/OT/SLP PROGRESS
Physical Therapy      Patient Name:  Maciel Contreras   MRN:  [id number]    Patient not seen today secondary to reports of significant pain in neck & shoulders Pain. Declined attempting bed mobility or w/c transfer today, asked to come tomorrow. Will follow-up tomorrow as schedule permits.    Cassi Romeo, PTA

## 2020-02-09 VITALS
TEMPERATURE: 98 F | WEIGHT: 216.5 LBS | RESPIRATION RATE: 18 BRPM | OXYGEN SATURATION: 100 % | HEART RATE: 57 BPM | SYSTOLIC BLOOD PRESSURE: 151 MMHG | DIASTOLIC BLOOD PRESSURE: 80 MMHG | HEIGHT: 73 IN | BODY MASS INDEX: 28.69 KG/M2

## 2020-02-09 LAB
ANION GAP SERPL CALC-SCNC: 8 MMOL/L (ref 8–16)
ANISOCYTOSIS BLD QL SMEAR: SLIGHT
BASOPHILS # BLD AUTO: ABNORMAL K/UL (ref 0–0.2)
BASOPHILS NFR BLD: 0 % (ref 0–1.9)
BUN SERPL-MCNC: 6 MG/DL (ref 8–23)
CALCIUM SERPL-MCNC: 8.4 MG/DL (ref 8.7–10.5)
CHLORIDE SERPL-SCNC: 111 MMOL/L (ref 95–110)
CO2 SERPL-SCNC: 23 MMOL/L (ref 23–29)
CREAT SERPL-MCNC: 0.7 MG/DL (ref 0.5–1.4)
DIFFERENTIAL METHOD: ABNORMAL
EOSINOPHIL # BLD AUTO: ABNORMAL K/UL (ref 0–0.5)
EOSINOPHIL NFR BLD: 4 % (ref 0–8)
ERYTHROCYTE [DISTWIDTH] IN BLOOD BY AUTOMATED COUNT: 15.9 % (ref 11.5–14.5)
EST. GFR  (AFRICAN AMERICAN): >60 ML/MIN/1.73 M^2
EST. GFR  (NON AFRICAN AMERICAN): >60 ML/MIN/1.73 M^2
GLUCOSE SERPL-MCNC: 82 MG/DL (ref 70–110)
HCT VFR BLD AUTO: 37.6 % (ref 40–54)
HGB BLD-MCNC: 12 G/DL (ref 14–18)
IMM GRANULOCYTES # BLD AUTO: ABNORMAL K/UL (ref 0–0.04)
IMM GRANULOCYTES NFR BLD AUTO: ABNORMAL % (ref 0–0.5)
LYMPHOCYTES # BLD AUTO: ABNORMAL K/UL (ref 1–4.8)
LYMPHOCYTES NFR BLD: 40 % (ref 18–48)
MCH RBC QN AUTO: 28 PG (ref 27–31)
MCHC RBC AUTO-ENTMCNC: 31.9 G/DL (ref 32–36)
MCV RBC AUTO: 88 FL (ref 82–98)
MONOCYTES # BLD AUTO: ABNORMAL K/UL (ref 0.3–1)
MONOCYTES NFR BLD: 11 % (ref 4–15)
NEUTROPHILS NFR BLD: 44 % (ref 38–73)
NEUTS BAND NFR BLD MANUAL: 1 %
NRBC BLD-RTO: 0 /100 WBC
PLATELET # BLD AUTO: 359 K/UL (ref 150–350)
PLATELET BLD QL SMEAR: ABNORMAL
PMV BLD AUTO: 10 FL (ref 9.2–12.9)
POTASSIUM SERPL-SCNC: 3.4 MMOL/L (ref 3.5–5.1)
RBC # BLD AUTO: 4.29 M/UL (ref 4.6–6.2)
SODIUM SERPL-SCNC: 142 MMOL/L (ref 136–145)
WBC # BLD AUTO: 10.2 K/UL (ref 3.9–12.7)

## 2020-02-09 PROCEDURE — 99239 HOSP IP/OBS DSCHRG MGMT >30: CPT | Mod: ,,, | Performed by: INTERNAL MEDICINE

## 2020-02-09 PROCEDURE — 80048 BASIC METABOLIC PNL TOTAL CA: CPT

## 2020-02-09 PROCEDURE — 94761 N-INVAS EAR/PLS OXIMETRY MLT: CPT

## 2020-02-09 PROCEDURE — 36415 COLL VENOUS BLD VENIPUNCTURE: CPT

## 2020-02-09 PROCEDURE — 63600175 PHARM REV CODE 636 W HCPCS: Performed by: PHYSICIAN ASSISTANT

## 2020-02-09 PROCEDURE — 85007 BL SMEAR W/DIFF WBC COUNT: CPT

## 2020-02-09 PROCEDURE — 25000003 PHARM REV CODE 250: Performed by: PHYSICIAN ASSISTANT

## 2020-02-09 PROCEDURE — 99239 PR HOSPITAL DISCHARGE DAY,>30 MIN: ICD-10-PCS | Mod: ,,, | Performed by: INTERNAL MEDICINE

## 2020-02-09 PROCEDURE — 25000003 PHARM REV CODE 250: Performed by: INTERNAL MEDICINE

## 2020-02-09 PROCEDURE — 85027 COMPLETE CBC AUTOMATED: CPT

## 2020-02-09 RX ORDER — POTASSIUM CHLORIDE 20 MEQ/15ML
40 SOLUTION ORAL ONCE
Status: COMPLETED | OUTPATIENT
Start: 2020-02-09 | End: 2020-02-09

## 2020-02-09 RX ORDER — FLUCONAZOLE 200 MG/1
200 TABLET ORAL DAILY
Qty: 12 TABLET | Refills: 0 | Status: SHIPPED | OUTPATIENT
Start: 2020-02-10 | End: 2020-02-22

## 2020-02-09 RX ADMIN — ATENOLOL 100 MG: 25 TABLET ORAL at 08:02

## 2020-02-09 RX ADMIN — SODIUM CHLORIDE: 0.9 INJECTION, SOLUTION INTRAVENOUS at 06:02

## 2020-02-09 RX ADMIN — POTASSIUM CHLORIDE 40 MEQ: 40 SOLUTION ORAL at 08:02

## 2020-02-09 RX ADMIN — FAMOTIDINE 20 MG: 20 TABLET ORAL at 08:02

## 2020-02-09 RX ADMIN — OXYCODONE HYDROCHLORIDE AND ACETAMINOPHEN 1 TABLET: 10; 325 TABLET ORAL at 06:02

## 2020-02-09 RX ADMIN — COLLAGENASE SANTYL: 250 OINTMENT TOPICAL at 10:02

## 2020-02-09 RX ADMIN — LORAZEPAM 2 MG: 1 TABLET ORAL at 08:02

## 2020-02-09 RX ADMIN — FLUCONAZOLE 200 MG: 100 TABLET ORAL at 08:02

## 2020-02-09 NOTE — PLAN OF CARE
[facility] will resume services tomorrow.  Pt choice form signed.  Information added to AVS.  Referral completed in [employer].    Family to drive pt home.    No further DC needs from CM perspective.       02/09/20 1146   Final Note   Assessment Type Final Discharge Note   Anticipated Discharge Disposition Home-Health   What phone number can be called within the next 1-3 days to see how you are doing after discharge? [phone]   Hospital Follow Up  Appt(s) scheduled? Yes   Discharge plans and expectations educations in teach back method with documentation complete? Yes   Right Care Referral Info   Post Acute Recommendation Home-care

## 2020-02-09 NOTE — PLAN OF CARE
Orders and referral sent to [facility] in [facility].     Spoke with [facility] to check acceptance.  Spoke with Ashli, on call nurse,  [phone].  She will notify team and they will resume services tomorrow.

## 2020-02-09 NOTE — PT/OT/SLP DISCHARGE
Occupational Therapy Discharge Summary    Maciel Contreras  MRN: [id number]   Principal Problem: Sepsis      Patient Discharged from acute Occupational Therapy on 2/9/20.  Please refer to prior OT note dated 2/7/20 for functional status.    Assessment:      Patient appropriate for care in another setting.    Objective:     GOALS:   Multidisciplinary Problems     Occupational Therapy Goals        Problem: Occupational Therapy Goal    Goal Priority Disciplines Outcome Interventions   Occupational Therapy Goal     OT, PT/OT Ongoing, Progressing    Description:  Goals to be met by: 2/14/20    Patient will increase functional independence with ADLs by performing:    ADL transfers at SBA level.  LB dressing at Set up level.                                 Reasons for Discontinuation of Therapy Services  Transfer to alternate level of care.      Plan:     Patient Discharged to: Home with Home Health Service    Sanam Evans, Gabriela, LOTR  2/9/2020

## 2020-02-09 NOTE — PLAN OF CARE
Free from falls, injury, or skin breakdown this hospital admission.  Pt eager & in agreement w/ DC. VU of DC instructions and the need to attend follow-up appointments--paperwork  passed & explained-- script called to pharmacy per MD. IV removed w/ cath tip intact, WNL. Meng catheter in place.To be DCd home w/ family--will be escorted downstairs via personal WC once dressed, ready & ride arrives. Pt discharged in no distress w/ son and HH set up.
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
## 2020-02-09 NOTE — PLAN OF CARE
[facility]    HOME HEALTH ORDERS  FACE TO FACE ENCOUNTER    Patient Name: Maciel Contreras  YOB: 1955    PCP: Timur Caldwell MD   PCP Address: [facility] [place] [address] / [facility] [address]  PCP Phone Number: [phone]  PCP Fax: [phone]    Encounter Date: 02/09/2020    Admit to Home Health    Diagnoses:  Active Hospital Problems    Diagnosis  POA    *Sepsis [A41.9]  Yes    Recurrent UTI (urinary tract infection) [N39.0]  Yes    Constipation [K59.00]  Yes    Acute right-sided thoracic back pain [M54.6]  Yes    Pressure injury of ankle, stage 4 [L89.504]  Yes    Pressure injury of heel, stage 3 [L89.603]  Yes    Pressure injury of skin of sacral region [L89.159]  Yes    HTN (hypertension) [I10]  Yes    Paraplegia [G82.20]  Yes      Resolved Hospital Problems   No resolved problems to display.       Future Appointments   Date Time Provider Department Center   2/12/2020 11:00 AM [facility] OIC-XRAY [facility]   2/28/2020  2:20 PM Mirna Guzman NP [facility] WOUND [address]     Follow-up Information     Timur Caldwell MD In 2 weeks.    Specialty:  Internal Medicine  Why:  post-hospital follow-up  Contact information:  [facility] [address] [phone]                 I have seen and examined this patient face to face today. My clinical findings that support the need for the home health skilled services and home bound status are the following:  Requiring assistive device to leave home due to unsteady gait caused by  Weakness/Debility.    Allergies:  Review of patient's allergies indicates:   Allergen Reactions    Cephalexin Rash     Other reaction(s): Rash     Diet: regular diet    Activities: activity as tolerated    Nursing:   SN to complete comprehensive assessment including routine vital signs. Instruct on disease process and s/s of complications to report to MD. Review/verify medication list sent home with the patient at time of  discharge  and instruct patient/caregiver as needed. Frequency may be adjusted depending on start of care date.    Notify MD if SBP > 160 or < 90; DBP > 90 or < 50; HR > 120 or < 50; Temp > 101    CONSULTS:    Physical Therapy to evaluate and treat. Evaluate for home safety and equipment needs; Establish/upgrade home exercise program. Perform / instruct on therapeutic exercises, gait training, transfer training, and Range of Motion.  Occupational Therapy to evaluate and treat. Evaluate home environment for safety and equipment needs. Perform/Instruct on transfers, ADL training, ROM, and therapeutic exercises.   to evaluate for community resources/long-range planning.    MISCELLANEOUS CARE:  Meng Care: Instruct patient/caregiver to empty Meng bag.  Change Meng every month.    WOUND CARE ORDERS  yes:  Pressure Ulcer(s) Stage III:  Location: Perineum, R posterior heel, sacral, R lateral hip    Consult ET nurse        Apply the following to wound:   Collagenase (Santyl) daily, cover with bordered foam dressing    Pressure Ulcer(s) Stage IV:  Location: R lateral malleolus    Consult ET nurse        Apply the following to wound:   Collagenase (Santyl) daily, cover with bordered foam dressing    Medications: Review discharge medications with patient and family and provide education.      Current Discharge Medication List      START taking these medications    Details   fluconazole (DIFLUCAN) 200 MG Tab Take 1 tablet (200 mg total) by mouth once daily. for 12 days  Qty: 12 tablet, Refills: 0         CONTINUE these medications which have NOT CHANGED    Details   atenolol-chlorthalidone (TENORETIC) 100-25 mg per tablet TAKE 1 TABLET BY MOUTH EVERY DAY  Qty: 90 tablet, Refills: 3      carisoprodol (SOMA) 350 MG tablet Take 1 tablet (350 mg total) by mouth 3 (three) times daily as needed.  Qty: 90 tablet, Refills: 0      collagenase (SANTYL) ointment Apply topically once daily. Apply to wound daily as  directed.  Qty: 30 g, Refills: 1    Associated Diagnoses: Decubitus ulcer of ankle, right, unstageable      lactulose (CHRONULAC) 20 gram/30 mL Soln 1-2 tablespoons daily for constipation  Qty: 900 mL, Refills: 3      lidocaine (LIDODERM) 5 % APPLY DAILY  Refills: 3      LORazepam (ATIVAN) 2 MG Tab Take 1 tablet (2 mg total) by mouth 2 (two) times daily.  Qty: 60 tablet, Refills: 0    Comments: Not to exceed 4 additional fills before 06/25/2019      nitrofurantoin, macrocrystal-monohydrate, (MACROBID) 100 MG capsule Take 1 capsule (100 mg total) by mouth 2 (two) times daily.  Qty: 60 capsule, Refills: 1      oxyCODONE-acetaminophen (PERCOCET)  mg per tablet Take 1 tablet by mouth 3 (three) times daily as needed.  Qty: 90 tablet, Refills: 0    Comments: Quantity prescribed more than 7 day supply? Yes, quantity medically necessary      potassium chloride SA (K-DUR,KLOR-CON) 20 MEQ tablet Take 1 tablet (20 mEq total) by mouth once daily.  Qty: 90 tablet, Refills: 3      temazepam (RESTORIL) 30 mg capsule Take 1 capsule (30 mg total) by mouth nightly as needed for Insomnia.  Qty: 30 capsule, Refills: 2    Comments: Not to exceed 3 additional fills before 08/14/2019      traMADol (ULTRAM) 50 mg tablet TAKE 1 TO 2 TABLETS BY MOUTH THREE TIMES DAILY AS NEEDED FOR PAIN  Qty: 180 tablet, Refills: 1      zolpidem (AMBIEN) 10 mg Tab TAKE 1 TABLET BY MOUTH EVERY DAY IN THE EVENING AS NEEDED  Qty: 30 tablet, Refills: 2    Comments: This request is for a new prescription for a controlled substance as required by Federal/State law.      meclizine (ANTIVERT) 25 mg tablet Take 1 tablet (25 mg total) by mouth 3 (three) times daily as needed. For dizziness.  Qty: 30 tablet, Refills: 2         STOP taking these medications       amoxicillin-clavulanate 875-125mg (AUGMENTIN) 875-125 mg per tablet Comments:   Reason for Stopping:         famotidine (PEPCID) 20 MG tablet Comments:   Reason for Stopping:         furosemide (LASIX) 40  MG tablet Comments:   Reason for Stopping:         mometasone (NASONEX) 50 mcg/actuation nasal spray Comments:   Reason for Stopping:         meloxicam (MOBIC) 15 MG tablet Comments:   Reason for Stopping:               I certify that this patient is confined to his home and needs intermittent skilled nursing care, physical therapy and occupational therapy.

## 2020-02-11 ENCOUNTER — PATIENT OUTREACH (OUTPATIENT)
Dept: ADMINISTRATIVE | Facility: CLINIC | Age: 65
End: 2020-02-11
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
## 2020-02-11 NOTE — PATIENT INSTRUCTIONS
Urinary Tract Infections in Men    Urinary tract infections (UTIs) are most often caused by bacteria that invade the urinary tract. The bacteria may come from outside the body. Or they may travel from the skin outside of rectum into the urethra. Pain in or around the urinary tract is a common symptom for most UTIs. But the only way to know for sure if you have a UTI is to have a urinalysis and urine culture.   Types of UTIs  · Cystitis: This is a bladder infection and is often linked to a blockage from an enlarged prostate. You may have an urgent or frequent need to urinate, and bloody urine. Treatment includes antibiotics and medicine to relax or shrink the prostate. Sometimes, surgery is needed.  · Urethritis: This is an infection of the urethra. You may have a discharge from the urethra or burning when you urinate. You may also have pain in the urethra or penis. It is treated with antibiotics.  · Prostatitis: This is an inflammation or infection of the prostate. You may have an urgent or frequent need to urinate, fever, or burning when you urinate. Or you may have a tender prostate, or a vague feeling of pressure. Prostatitis is treated with a range of medicines, depending on the cause.  · Pyelonephritis: This is a kidney infection. If not treated, it can be serious and damage your kidneys. In severe cases you may be hospitalized. You may have a fever and upper back pain.  Treating a UTI  · Medicine: Most UTIs are treated with antibiotics. These kill the bacteria. The length of time you need to take them depends on the type of infection. Take antibiotics exactly as directed until all of the medicine is gone. If you don't, the infection may not go away and may become harder to treat. For certain types of UTIs, you may be given other medicine to help treat your symptoms.  · Lifestyle changes: The lifestyle changes below will help get rid of your current infection. They may also help prevent future UTIs.  ¨ Drink  plenty of fluids such as water, juice, or other caffeine-free drinks. This helps flush bacteria out of your system.  ¨ Empty your bladder when you feel the urge to urinate and before going to sleep. Urine that stays in your bladder promotes infection.  ¨ Use condoms during sex. These help prevent UTIs caused by sexually transmitted bacteria.  ¨ Keep follow-up appointments with your healthcare provider. He or she can may do tests to make sure the infection has cleared. If needed, more treatment can be started.  · Other treatment: Most UTIs respond to medicine. But sometimes a procedure or surgery is needed. This can treat an enlarged prostate, or remove a kidney stone or other blockage. Surgery may also treat problems caused by scarring or long-term infections.  Date Last Reviewed: 1/1/2017  © 6987-8727 [employer]. [address] [place] [address], [place] [address]. All rights reserved. This information is not intended as a substitute for professional medical care. Always follow your healthcare professional's instructions.

## 2020-02-12 LAB
BACTERIA BLD CULT: NORMAL
BACTERIA BLD CULT: NORMAL

## 2020-04-01 ENCOUNTER — OFFICE VISIT (OUTPATIENT)
Dept: WOUND CARE | Facility: CLINIC | Age: 65
End: 2020-04-01
Payer: MEDICARE

## 2020-04-01 VITALS
SYSTOLIC BLOOD PRESSURE: 138 MMHG | OXYGEN SATURATION: 99 % | HEART RATE: 74 BPM | DIASTOLIC BLOOD PRESSURE: 76 MMHG | TEMPERATURE: 98 F

## 2020-04-01 DIAGNOSIS — L89.514 PRESSURE INJURY OF RIGHT ANKLE, STAGE 4: ICD-10-CM

## 2020-04-01 DIAGNOSIS — L89.613 PRESSURE INJURY OF RIGHT HEEL, STAGE 3: Primary | ICD-10-CM

## 2020-04-01 PROBLEM — L89.603 PRESSURE INJURY OF HEEL, STAGE 3: Status: RESOLVED | Noted: 2020-02-07 | Resolved: 2020-04-01

## 2020-04-01 PROBLEM — A41.9 SEPSIS: Status: RESOLVED | Noted: 2020-02-07 | Resolved: 2020-04-01

## 2020-04-01 PROCEDURE — 99214 PR OFFICE/OUTPT VISIT, EST, LEVL IV, 30-39 MIN: ICD-10-PCS | Mod: 95,,, | Performed by: NURSE PRACTITIONER

## 2020-04-01 PROCEDURE — 99214 OFFICE O/P EST MOD 30 MIN: CPT | Mod: 95,,, | Performed by: NURSE PRACTITIONER

## 2020-04-01 NOTE — PROGRESS NOTES
Subjective:       Patient ID: Maciel Contreras is a 64 y.o. male.    Chief Complaint: Wound Check    Wound Check       This patient is seen today for reevaluation of recurrent decubiti to the right buttock, testicle, right ankle and right heel.  A foam border dressing is on the buttock, right heel and testicular wounds.  He has recurrent new wounds to the right ankle. The testicular wounds are healed and the right buttock and foot wounds are improved as evidenced by increased granulation and wound contracture.  He is paraplegic and these wounds are all related to pressure.  He has home health services three times a week through [facility].  His medical history is significant for paraplegia.  He is afebrile. He denies increased redness or purulent drainage but does have increased edema.  He does not have pain from the wounds.  Today's visit is a virtual visit while the home health nurse is present in the home.   Review of Systems    Unchanged from prior visit.  Objective:      Physical Exam   Constitutional: He is oriented to person, place, and time. He appears well-developed and well-nourished. No distress.   HENT:   Head: Normocephalic and atraumatic.   Musculoskeletal: He exhibits no edema or tenderness.        Legs:       Feet:    Patient is paraplegic and unable to voluntarily move lower extremities.   Neurological: He is alert and oriented to person, place, and time.   Skin: No rash noted. He is not diaphoretic. No cyanosis or erythema. Nails show no clubbing.   Psychiatric: He has a normal mood and affect. His behavior is normal. Judgment and thought content normal.   Nursing note and vitals reviewed.      Right anterior ankle      Right lateral ankle       Right heel      Right buttock    Left hip healed        Assessment:       1. Pressure injury of right heel, stage 3    2. Pressure injury of right ankle, stage 4        Plan:           Xray of right ankle to rule out osteo.  Will see if Karen can  see the patient to cauterize the right lateral ankle wound.  Cleanse wounds with wound cleanser.  Apply skin prep to periwound area.  Apply aquacel foam border dressings to right right buttock ulcer and cover with a tegaderm.  Apply or santyl and aquacel to right ankle ulcers and cover with gauze.  Apply hydrofiber to right heel ulcer,  with cotton gauze and secure with roll gauze.  Two four inch ace wraps to right lower leg.  Change dressings three times weekly.  Place cotton in between toes.  Keep pressure off of bony prominences at all times.  Rotate position every 2 hours.  Change dressings three times weekly.  [facility] notified of orders via Epic email. We will ask them to continue to see the patient three times weekly.  Return to clinic in one month. We will try to arrange another virtual visit when the home health nurse can be present.    The patient location is: his home in [place]  The chief complaint leading to consultation is: pressure ulcers to the right lower leg and buttocks  Visit type: Virtual visit with synchronous audio and video  Total time spent with patient: 40 minutes  Each patient to whom he or she provides medical services by telemedicine is:  (1) informed of the relationship between the physician and patient and the respective role of any other health care provider with respect to management of the patient; and (2) notified that he or she may decline to receive medical services by telemedicine and may withdraw from such care at any time.    Notes: See above

## 2020-04-28 ENCOUNTER — TELEPHONE (OUTPATIENT)
Dept: WOUND CARE | Facility: CLINIC | Age: 65
End: 2020-04-28

## 2020-04-28 NOTE — TELEPHONE ENCOUNTER
Contacted patient to remind him to e-precheck for his appt with Ashli Guzman on 04/29/2020. Pt verbalized understanding.

## 2020-04-29 ENCOUNTER — TELEPHONE (OUTPATIENT)
Dept: WOUND CARE | Facility: CLINIC | Age: 65
End: 2020-04-29

## 2020-04-29 NOTE — TELEPHONE ENCOUNTER
Contacted patient per Ashli's request to offer to reschedule appt to later time in day. Pt accepted. Appt rescheduled, pt verified.----- Message from Rj Ron sent at 4/29/2020 11:56 AM CDT -----  Contact: Pt  Called to speak w/ Mirna Guzman NP regarding his virtual visit on 4/29 @11a, requesting callback     Callback: [phone] (home)

## 2020-05-08 ENCOUNTER — OFFICE VISIT (OUTPATIENT)
Dept: WOUND CARE | Facility: CLINIC | Age: 65
End: 2020-05-08
Payer: MEDICARE

## 2020-05-08 VITALS
HEART RATE: 73 BPM | DIASTOLIC BLOOD PRESSURE: 62 MMHG | TEMPERATURE: 98 F | SYSTOLIC BLOOD PRESSURE: 134 MMHG | RESPIRATION RATE: 18 BRPM

## 2020-05-08 DIAGNOSIS — L89.312 PRESSURE INJURY OF RIGHT BUTTOCK, STAGE 2: ICD-10-CM

## 2020-05-08 DIAGNOSIS — L89.514 PRESSURE INJURY OF RIGHT ANKLE, STAGE 4: ICD-10-CM

## 2020-05-08 DIAGNOSIS — G82.20 PARAPLEGIA: ICD-10-CM

## 2020-05-08 DIAGNOSIS — L89.613 PRESSURE INJURY OF RIGHT HEEL, STAGE 3: Primary | ICD-10-CM

## 2020-05-08 PROBLEM — L89.302 PRESSURE INJURY OF BUTTOCK, STAGE 2: Status: ACTIVE | Noted: 2020-02-07

## 2020-05-08 PROCEDURE — 99212 PR OFFICE/OUTPT VISIT, EST, LEVL II, 10-19 MIN: ICD-10-PCS | Mod: 95,,, | Performed by: NURSE PRACTITIONER

## 2020-05-08 PROCEDURE — 99212 OFFICE O/P EST SF 10 MIN: CPT | Mod: 95,,, | Performed by: NURSE PRACTITIONER

## 2020-05-08 NOTE — PROGRESS NOTES
Subjective:       Patient ID: Maciel Contreras is a 64 y.o. male.    Chief Complaint: Wound Check    Wound Check       This patient is seen today for reevaluation of recurrent decubiti to the right buttock, testicle, right ankle and right heel. The right buttock wound is healed.  Foam border dressings are being used on the foot and ankle wounds and they are healing as evidenced by wound contracture.  He is paraplegic and these wounds are all related to pressure.  He has home health services three times a week through [facility].  His medical history is significant for paraplegia.  He is afebrile. He denies increased redness or purulent drainage but does have increased edema.  He does not have pain from the wounds.  Today's visit is a virtual visit while the home health nurse is present in the home.   Review of Systems    Unchanged from prior visit.  Objective:      Physical Exam   Constitutional: He is oriented to person, place, and time. He appears well-developed and well-nourished. No distress.   HENT:   Head: Normocephalic and atraumatic.   Musculoskeletal: He exhibits no edema or tenderness.        Legs:       Feet:    Patient is paraplegic and unable to voluntarily move lower extremities.   Neurological: He is alert and oriented to person, place, and time.   Skin: No rash noted. He is not diaphoretic. No cyanosis or erythema. Nails show no clubbing.   Psychiatric: He has a normal mood and affect. His behavior is normal. Judgment and thought content normal.   Nursing note and vitals reviewed.      Right anterior ankle      Right lateral ankle       Right heel      Assessment:       1. Pressure injury of right heel, stage 3    2. Pressure injury of right ankle, stage 4    3. Pressure injury of right buttock, stage 2    4. Paraplegia        Plan:             Cleanse wounds with wound cleanser.  Apply skin prep to periwound area.  Apply foam border dressing to foot and heel ulcer.  Cover leg with cast  padding and roll gauze.  Two four inch ace wraps to right lower leg.  Change dressings three times weekly.  Place cotton in between toes.  Keep pressure off of bony prominences at all times.  Rotate position every 2 hours.  [facility] notified of orders via Epic email. We will ask them to continue to see the patient three times weekly.  Return to clinic in one month. We will try to arrange another virtual visit when the home health nurse can be present.    The patient location is: his home in [place]  The chief complaint leading to consultation is: pressure ulcers to the right lower leg and buttocks  Visit type: Virtual visit with synchronous audio and video  Total time spent with patient: 15 minutes  Each patient to whom he or she provides medical services by telemedicine is:  (1) informed of the relationship between the physician and patient and the respective role of any other health care provider with respect to management of the patient; and (2) notified that he or she may decline to receive medical services by telemedicine and may withdraw from such care at any time.    Notes: See above

## 2020-06-04 ENCOUNTER — EXTERNAL HOME HEALTH (OUTPATIENT)
Dept: HOME HEALTH SERVICES | Facility: HOSPITAL | Age: 65
End: 2020-06-04

## 2020-06-05 ENCOUNTER — TELEPHONE (OUTPATIENT)
Dept: WOUND CARE | Facility: CLINIC | Age: 65
End: 2020-06-05

## 2020-06-05 ENCOUNTER — OFFICE VISIT (OUTPATIENT)
Dept: WOUND CARE | Facility: CLINIC | Age: 65
End: 2020-06-05
Payer: MEDICARE

## 2020-06-05 VITALS — DIASTOLIC BLOOD PRESSURE: 62 MMHG | TEMPERATURE: 97 F | SYSTOLIC BLOOD PRESSURE: 124 MMHG | HEART RATE: 102 BPM

## 2020-06-05 DIAGNOSIS — L89.312 PRESSURE INJURY OF RIGHT BUTTOCK, STAGE 2: ICD-10-CM

## 2020-06-05 DIAGNOSIS — L89.613 PRESSURE INJURY OF RIGHT HEEL, STAGE 3: ICD-10-CM

## 2020-06-05 DIAGNOSIS — L89.514 PRESSURE INJURY OF RIGHT ANKLE, STAGE 4: Primary | ICD-10-CM

## 2020-06-05 DIAGNOSIS — S31.30XA OPEN WOUND OF SCROTUM, INITIAL ENCOUNTER: ICD-10-CM

## 2020-06-05 PROBLEM — L89.302 PRESSURE INJURY OF BUTTOCK, STAGE 2: Status: RESOLVED | Noted: 2020-02-07 | Resolved: 2020-06-05

## 2020-06-05 PROCEDURE — 99212 OFFICE O/P EST SF 10 MIN: CPT | Mod: 95,,, | Performed by: NURSE PRACTITIONER

## 2020-06-05 PROCEDURE — 99212 PR OFFICE/OUTPT VISIT, EST, LEVL II, 10-19 MIN: ICD-10-PCS | Mod: 95,,, | Performed by: NURSE PRACTITIONER

## 2020-06-05 NOTE — PROGRESS NOTES
Subjective:       Patient ID: Maciel Contreras is a 64 y.o. male.    Chief Complaint: Wound Check    Wound Check       This patient is seen today for reevaluation of recurrent decubiti to the right buttock, testicle, right ankle and right heel. The right buttock wound is healed.  Foam border dressings are being used on the foot and ankle wounds and they are healing as evidenced by wound contracture.  He is paraplegic and these wounds are all related to pressure.  The buttock and scrotal wound reoccurred when he was transferring from his chair to his bed. He has home health services three times a week through [facility].  His medical history is significant for paraplegia.  He is afebrile. He denies increased redness or purulent drainage but does have increased edema.  He does not have pain from the wounds.  Today's visit is a virtual visit while the home health nurse is present in the home.   Review of Systems    Unchanged from prior visit.  Objective:      Physical Exam   Constitutional: He is oriented to person, place, and time. He appears well-developed and well-nourished. No distress.   HENT:   Head: Normocephalic and atraumatic.   Musculoskeletal: He exhibits no edema or tenderness.        Legs:       Feet:    Patient is paraplegic and unable to voluntarily move lower extremities.   Neurological: He is alert and oriented to person, place, and time.   Skin: No rash noted. He is not diaphoretic. No cyanosis or erythema. Nails show no clubbing.   Psychiatric: He has a normal mood and affect. His behavior is normal. Judgment and thought content normal.   Nursing note and vitals reviewed.      Right anterior ankle      Right lateral ankle       Right heel    Right buttock    Scrotum          Assessment:       Pressure injury of right ankle, stage IV  Pressure injury of right heel, stage III  Pressure injury of right buttock, Stage II\  Open wound of scrotum, initial encounter      Plan:             Cleanse  wounds with wound cleanser.  Apply skin prep to periwound area.  Apply foam border dressing to ankle and heel ulcer and to right buttock decubitus.  Apply medihoney gel to scrotal wound and cover with a foam border dressing.  Cover leg with cast padding and roll gauze.  Two four inch ace wraps to right lower leg.  Change dressings three times weekly.  Place cotton in between toes.  Keep pressure off of bony prominences at all times.  Rotate position every 2 hours.  [facility] notified of orders via Epic email. We will ask them to continue to see the patient three times weekly.  Return to clinic in one month. We will try to arrange another virtual visit when the home health nurse can be present.    The patient location is: his home in [place]  The chief complaint leading to consultation is: decubiti to the right ankle and heel, right buttock and scrotal area.    Visit type: audiovisual    Face to Face time with patient: 10 minutes  20 minutes of total time spent on the encounter, which includes face to face time and non-face to face time preparing to see the patient (eg, review of tests), Obtaining and/or reviewing separately obtained history, Documenting clinical information in the electronic or other health record, Independently interpreting results (not separately reported) and communicating results to the patient/family/caregiver, or Care coordination (not separately reported).         Each patient to whom he or she provides medical services by telemedicine is:  (1) informed of the relationship between the physician and patient and the respective role of any other health care provider with respect to management of the patient; and (2) notified that he or she may decline to receive medical services by telemedicine and may withdraw from such care at any time.    Notes: See  above

## 2020-06-05 NOTE — TELEPHONE ENCOUNTER
Returned call to Karen  nurse, spoke with after hours  asked if I could leave message -  advised me to call back on Monday.

## 2020-06-05 NOTE — TELEPHONE ENCOUNTER
----- Message from Lissette Anna sent at 6/5/2020  3:35 PM CDT -----  Contact: Karen Calling From [place] [phone]  Has a couple of questions about Wound Care Orders that was just faxed over.  Right Interior Ankle.

## 2020-07-01 ENCOUNTER — OFFICE VISIT (OUTPATIENT)
Dept: WOUND CARE | Facility: CLINIC | Age: 65
End: 2020-07-01
Payer: MEDICARE

## 2020-07-01 VITALS — SYSTOLIC BLOOD PRESSURE: 138 MMHG | HEART RATE: 69 BPM | TEMPERATURE: 98 F | DIASTOLIC BLOOD PRESSURE: 78 MMHG

## 2020-07-01 DIAGNOSIS — L89.620 PRESSURE INJURY OF LEFT HEEL, UNSTAGEABLE: ICD-10-CM

## 2020-07-01 DIAGNOSIS — L89.514 PRESSURE INJURY OF RIGHT ANKLE, STAGE 4: ICD-10-CM

## 2020-07-01 DIAGNOSIS — G82.20 PARAPLEGIA: ICD-10-CM

## 2020-07-01 DIAGNOSIS — L89.613 PRESSURE INJURY OF RIGHT HEEL, STAGE 3: Primary | ICD-10-CM

## 2020-07-01 PROBLEM — L89.312 PRESSURE INJURY OF RIGHT BUTTOCK, STAGE 2: Status: RESOLVED | Noted: 2020-06-05 | Resolved: 2020-07-01

## 2020-07-01 PROBLEM — S31.30XA OPEN WOUND OF SCROTUM: Status: RESOLVED | Noted: 2020-06-05 | Resolved: 2020-07-01

## 2020-07-01 PROCEDURE — 99212 PR OFFICE/OUTPT VISIT, EST, LEVL II, 10-19 MIN: ICD-10-PCS | Mod: 95,,, | Performed by: NURSE PRACTITIONER

## 2020-07-01 PROCEDURE — 99212 OFFICE O/P EST SF 10 MIN: CPT | Mod: 95,,, | Performed by: NURSE PRACTITIONER

## 2020-07-01 NOTE — PROGRESS NOTES
Subjective:       Patient ID: Maciel Contreras is a 64 y.o. male.    Chief Complaint: Wound Check    Wound Check    This patient is seen today for reevaluation of recurrent decubiti to the scrotum, right ankle and left heel. Foam border dressings are being used on the foot and ankle wounds and they are healing as evidenced by wound contracture.  He is paraplegic and these wounds are all related to pressure.  The scrotal wound reoccurred when he was transferring from his chair to his bed. He has home health services three times a week through [facility].  His medical history is significant for paraplegia.  He is afebrile. He denies increased redness or purulent drainage but does have increased edema.  He does not have pain from the wounds.  Today's visit is a virtual visit while the home health nurse is present in the home.   Review of Systems    Unchanged from prior visit.  Objective:      Physical Exam  Vitals signs and nursing note reviewed.   Constitutional:       General: He is not in acute distress.     Appearance: He is well-developed. He is not diaphoretic.   HENT:      Head: Normocephalic and atraumatic.   Musculoskeletal:         General: No tenderness.        Legs:         Feet:       Comments: Patient is paraplegic and unable to voluntarily move lower extremities.   Skin:     Findings: No erythema or rash.      Nails: There is no clubbing.     Neurological:      Mental Status: He is alert and oriented to person, place, and time.   Psychiatric:         Behavior: Behavior normal.         Thought Content: Thought content normal.         Judgment: Judgment normal.         Right anterior ankle      Right lateral ankle       Right heel      Left anterior ankle      Left medial ankle      Left heel      Right buttock      Scrotum        Assessment:       Pressure injury of right ankle, stage IV  Pressure injury of right heel, stage III  Pressure injury of left ankle, unstageable      Plan:              Cleanse wounds with wound cleanser.  Apply skin prep to periwound area.  Apply foam border dressing to right ankle and heel ulcer.  Apply medihoney gel to left medial ankle ulcer and cover with hydrofiber and a foam border dressing.  Cover left heel ulcer with hydrofiber and a foam border dressing.  Cover leg with cast padding and roll gauze.  Tubigrip to bilateral lower legs for compression.  Change dressings three times weekly.  Place cotton in between toes.  Keep pressure off of bony prominences at all times.  Rotate position every 2 hours.  [facility] notified of orders via Epic email. We will ask them to continue to see the patient three times weekly.  Return to clinic in one month. We will try to arrange another virtual visit when the home health nurse can be present.    The patient location is: his home in [place]  The chief complaint leading to consultation is: decubiti to the right ankle and heel, right buttock and scrotal area.    Visit type: audiovisual    Face to Face time with patient: 15 minutes  30 minutes of total time spent on the encounter, which includes face to face time and non-face to face time preparing to see the patient (eg, review of tests), Obtaining and/or reviewing separately obtained history, Documenting clinical information in the electronic or other health record, Independently interpreting results (not separately reported) and communicating results to the patient/family/caregiver, or Care coordination (not separately reported).         Each patient to whom he or she provides medical services by telemedicine is:  (1) informed of the relationship between the physician and patient and the respective role of any other health care provider with respect to management of the patient; and (2) notified that he or she may decline to receive medical services by telemedicine and may withdraw from such care at any time.    Notes: See  above

## 2020-08-05 ENCOUNTER — OFFICE VISIT (OUTPATIENT)
Dept: WOUND CARE | Facility: CLINIC | Age: 65
End: 2020-08-05
Payer: MEDICARE

## 2020-08-05 DIAGNOSIS — L89.312 DECUBITUS ULCER OF RIGHT BUTTOCK, STAGE 2: ICD-10-CM

## 2020-08-05 DIAGNOSIS — G82.20 PARAPLEGIA: ICD-10-CM

## 2020-08-05 DIAGNOSIS — L89.620 PRESSURE INJURY OF LEFT HEEL, UNSTAGEABLE: Primary | ICD-10-CM

## 2020-08-05 PROBLEM — L89.504: Status: RESOLVED | Noted: 2020-02-07 | Resolved: 2020-08-05

## 2020-08-05 PROCEDURE — 99212 PR OFFICE/OUTPT VISIT, EST, LEVL II, 10-19 MIN: ICD-10-PCS | Mod: 95,,, | Performed by: NURSE PRACTITIONER

## 2020-08-05 PROCEDURE — 99212 OFFICE O/P EST SF 10 MIN: CPT | Mod: 95,,, | Performed by: NURSE PRACTITIONER

## 2020-08-05 NOTE — PROGRESS NOTES
Subjective:       Patient ID: Maciel Contreras is a 65 y.o. male.    Chief Complaint: Wound Check    Wound Check  This patient is seen today for reevaluation of recurrent decubiti to bilateral lower legs.  Foam border dressings are being used on the foot and ankle wounds. The original wounds are healed but he has recurrent wounds to the left lateral heel and right buttock.  He is paraplegic and these wounds are all related to pressure.  He has home health services three times a week through [facility].  His medical history is significant for paraplegia.  He is afebrile. He denies increased redness or purulent drainage but does have increased edema.  He does not have pain from the wounds.  Today's visit is a virtual visit while the home health nurse is present in the home.     Review of Systems    Unchanged from prior visit.  Objective:      Physical Exam  Vitals signs and nursing note reviewed.   Constitutional:       General: He is not in acute distress.     Appearance: He is well-developed. He is not diaphoretic.   HENT:      Head: Normocephalic and atraumatic.   Musculoskeletal:         General: No tenderness.        Legs:         Feet:       Comments: Patient is paraplegic and unable to voluntarily move lower extremities.   Skin:     Findings: No erythema or rash.      Nails: There is no clubbing.     Neurological:      Mental Status: He is alert and oriented to person, place, and time.   Psychiatric:         Behavior: Behavior normal.         Thought Content: Thought content normal.         Judgment: Judgment normal.         Right lateral ankle    Right heel    Left lateral heel    Left heel          Assessment:       Pressure injury of right ankle, stage IV  Pressure injury of right heel, stage III  Pressure injury of left ankle, unstageable      Plan:             Cleanse wounds with wound cleanser.  Apply skin prep to periwound area.  Apply foam border dressing to right buttock decubitus.  Apply  medihoney gel to left medial heel ulcer and cover with hydrofiber and a foam border dressing.  Cover leg with cast padding and roll gauze.  Tubigrip to bilateral lower legs for compression.  Change dressings three times weekly.  Place cotton in between toes.  Keep pressure off of bony prominences at all times.  Rotate position every 2 hours.  [facility] notified of orders via Epic email. We will ask them to continue to see the patient three times weekly.  Return to clinic in one month. We will try to arrange another virtual visit when the home health nurse can be present.    The patient location is: his home in [place]  The chief complaint leading to consultation is: decubiti to the right ankle and heel, right buttock and scrotal area.    Visit type: audiovisual    Face to Face time with patient: 15 minutes  30 minutes of total time spent on the encounter, which includes face to face time and non-face to face time preparing to see the patient (eg, review of tests), Obtaining and/or reviewing separately obtained history, Documenting clinical information in the electronic or other health record, Independently interpreting results (not separately reported) and communicating results to the patient/family/caregiver, or Care coordination (not separately reported).         Each patient to whom he or she provides medical services by telemedicine is:  (1) informed of the relationship between the physician and patient and the respective role of any other health care provider with respect to management of the patient; and (2) notified that he or she may decline to receive medical services by telemedicine and may withdraw from such care at any time.    Notes: See above

## 2020-09-02 ENCOUNTER — OFFICE VISIT (OUTPATIENT)
Dept: WOUND CARE | Facility: CLINIC | Age: 65
End: 2020-09-02
Payer: MEDICARE

## 2020-09-02 VITALS
RESPIRATION RATE: 18 BRPM | SYSTOLIC BLOOD PRESSURE: 120 MMHG | TEMPERATURE: 98 F | DIASTOLIC BLOOD PRESSURE: 64 MMHG | HEART RATE: 63 BPM

## 2020-09-02 DIAGNOSIS — L89.620 PRESSURE INJURY OF LEFT HEEL, UNSTAGEABLE: Primary | ICD-10-CM

## 2020-09-02 DIAGNOSIS — G82.20 PARAPLEGIA: ICD-10-CM

## 2020-09-02 DIAGNOSIS — L89.312 DECUBITUS ULCER OF RIGHT BUTTOCK, STAGE 2: ICD-10-CM

## 2020-09-02 PROCEDURE — 99212 PR OFFICE/OUTPT VISIT, EST, LEVL II, 10-19 MIN: ICD-10-PCS | Mod: 95,,, | Performed by: NURSE PRACTITIONER

## 2020-09-02 PROCEDURE — 99212 OFFICE O/P EST SF 10 MIN: CPT | Mod: 95,,, | Performed by: NURSE PRACTITIONER

## 2020-09-02 NOTE — PROGRESS NOTES
Subjective:       Patient ID: Maciel Contreras is a 65 y.o. male.    Chief Complaint: Wound Check    Wound Check    This patient is seen today for reevaluation of recurrent decubiti to the right buttock and left heel.  The buttock wound is healed. Medihoney gel, hydrofiber and a foam border dressing is being used on the heel wound.  It is larger in size but  as it is being autolytically debrided beneath the dressing. He is paraplegic and these wounds are all related to pressure.  He has home health services three times a week through [facility].  His medical history is significant for paraplegia.  He is afebrile. He denies increased redness or purulent drainage but does have increased edema.  He does not have pain from the wounds.  Today's visit is a virtual visit while the home health nurse is present in the home.     Review of Systems    Unchanged from prior visit.  Objective:      Physical Exam  Vitals signs and nursing note reviewed.   Constitutional:       General: He is not in acute distress.     Appearance: He is well-developed. He is not diaphoretic.   HENT:      Head: Normocephalic and atraumatic.   Musculoskeletal:         General: No tenderness.        Legs:         Feet:       Comments: Patient is paraplegic and unable to voluntarily move lower extremities.   Skin:     Findings: No erythema or rash.      Nails: There is no clubbing.     Neurological:      Mental Status: He is alert and oriented to person, place, and time.   Psychiatric:         Behavior: Behavior normal.         Thought Content: Thought content normal.         Judgment: Judgment normal.         Left lateral heel          Assessment:       Pressure injury of left heel, unstageable      Plan:             Cleanse wounds with wound cleanser.  Apply skin prep to periwound area.  Apply medihoney gel to left medial heel ulcer and cover with hydrofiber and a foam border dressing.  Cover leg with cast padding and roll  gauze.  Tubigrip to bilateral lower legs for compression.  Change dressings three times weekly.  Place cotton in between toes.  Keep pressure off of bony prominences at all times.  Rotate position every 2 hours.  [facility] notified of orders via Epic email. We will ask them to continue to see the patient three times weekly.  Return to clinic in one month. We will try to arrange another virtual visit when the home health nurse can be present.    The patient location is: his home in [place]  The chief complaint leading to consultation is: decubiti to the right ankle and heel, right buttock and scrotal area.    Visit type: audiovisual    Face to Face time with patient: 10 minutes  15 minutes of total time spent on the encounter, which includes face to face time and non-face to face time preparing to see the patient (eg, review of tests), Obtaining and/or reviewing separately obtained history, Documenting clinical information in the electronic or other health record, Independently interpreting results (not separately reported) and communicating results to the patient/family/caregiver, or Care coordination (not separately reported).         Each patient to whom he or she provides medical services by telemedicine is:  (1) informed of the relationship between the physician and patient and the respective role of any other health care provider with respect to management of the patient; and (2) notified that he or she may decline to receive medical services by telemedicine and may withdraw from such care at any time.    Notes: See above

## 2020-12-07 ENCOUNTER — PES CALL (OUTPATIENT)
Dept: ADMINISTRATIVE | Facility: CLINIC | Age: 65
End: 2020-12-07

## 2021-01-13 ENCOUNTER — OFFICE VISIT (OUTPATIENT)
Dept: WOUND CARE | Facility: CLINIC | Age: 66
End: 2021-01-13
Payer: MEDICARE

## 2021-01-13 VITALS
SYSTOLIC BLOOD PRESSURE: 142 MMHG | OXYGEN SATURATION: 99 % | HEART RATE: 68 BPM | RESPIRATION RATE: 18 BRPM | DIASTOLIC BLOOD PRESSURE: 73 MMHG | TEMPERATURE: 97 F

## 2021-01-13 DIAGNOSIS — G82.20 PARAPLEGIA: ICD-10-CM

## 2021-01-13 DIAGNOSIS — L89.212 PRESSURE INJURY OF RIGHT THIGH, STAGE 2: ICD-10-CM

## 2021-01-13 DIAGNOSIS — L89.152 PRESSURE INJURY OF SACRAL REGION, STAGE 2: ICD-10-CM

## 2021-01-13 DIAGNOSIS — L89.312 DECUBITUS ULCER OF RIGHT BUTTOCK, STAGE 2: Primary | ICD-10-CM

## 2021-01-13 PROBLEM — L89.620 PRESSURE INJURY OF LEFT HEEL, UNSTAGEABLE: Status: RESOLVED | Noted: 2020-07-01 | Resolved: 2021-01-13

## 2021-01-13 PROBLEM — L92.9 ABNORMAL GRANULATION TISSUE: Status: RESOLVED | Noted: 2019-12-04 | Resolved: 2021-01-13

## 2021-01-13 PROCEDURE — 99212 PR OFFICE/OUTPT VISIT, EST, LEVL II, 10-19 MIN: ICD-10-PCS | Mod: 95,,, | Performed by: NURSE PRACTITIONER

## 2021-01-13 PROCEDURE — 99212 OFFICE O/P EST SF 10 MIN: CPT | Mod: 95,,, | Performed by: NURSE PRACTITIONER

## 2021-01-21 ENCOUNTER — HOSPITAL ENCOUNTER (INPATIENT)
Facility: OTHER | Age: 66
LOS: 11 days | Discharge: HOME HEALTH | DRG: 659 | End: 2021-02-01
Attending: INTERNAL MEDICINE | Admitting: EMERGENCY MEDICINE
Payer: MEDICARE

## 2021-01-21 DIAGNOSIS — A41.9 SEPSIS: Primary | ICD-10-CM

## 2021-01-21 DIAGNOSIS — R18.8 ABDOMINAL FLUID COLLECTION: ICD-10-CM

## 2021-01-21 DIAGNOSIS — R65.20 SEVERE SEPSIS: ICD-10-CM

## 2021-01-21 DIAGNOSIS — N39.0 URINARY TRACT INFECTION ASSOCIATED WITH INDWELLING URETHRAL CATHETER, INITIAL ENCOUNTER: ICD-10-CM

## 2021-01-21 DIAGNOSIS — K66.8 PNEUMOPERITONEUM: ICD-10-CM

## 2021-01-21 DIAGNOSIS — I10 ESSENTIAL HYPERTENSION: ICD-10-CM

## 2021-01-21 DIAGNOSIS — A41.9 SEVERE SEPSIS: ICD-10-CM

## 2021-01-21 DIAGNOSIS — N13.30 HYDRONEPHROSIS OF RIGHT KIDNEY: ICD-10-CM

## 2021-01-21 DIAGNOSIS — N17.9 AKI (ACUTE KIDNEY INJURY): ICD-10-CM

## 2021-01-21 DIAGNOSIS — T83.511A URINARY TRACT INFECTION ASSOCIATED WITH INDWELLING URETHRAL CATHETER, INITIAL ENCOUNTER: ICD-10-CM

## 2021-01-21 PROBLEM — T14.8XXA MULTIPLE WOUNDS OF SKIN: Status: ACTIVE | Noted: 2021-01-21

## 2021-01-21 LAB
ALBUMIN SERPL BCP-MCNC: 2.1 G/DL (ref 3.5–5.2)
ALP SERPL-CCNC: 185 U/L (ref 55–135)
ALT SERPL W/O P-5'-P-CCNC: 22 U/L (ref 10–44)
ANION GAP SERPL CALC-SCNC: 17 MMOL/L (ref 8–16)
ANISOCYTOSIS BLD QL SMEAR: SLIGHT
AST SERPL-CCNC: 34 U/L (ref 10–40)
BACTERIA #/AREA URNS HPF: ABNORMAL /HPF
BASOPHILS # BLD AUTO: ABNORMAL K/UL (ref 0–0.2)
BASOPHILS NFR BLD: 0 % (ref 0–1.9)
BILIRUB SERPL-MCNC: 0.8 MG/DL (ref 0.1–1)
BILIRUB UR QL STRIP: NEGATIVE
BUN SERPL-MCNC: 87 MG/DL (ref 8–23)
CALCIUM SERPL-MCNC: 9.6 MG/DL (ref 8.7–10.5)
CHLORIDE SERPL-SCNC: 95 MMOL/L (ref 95–110)
CLARITY UR: ABNORMAL
CO2 SERPL-SCNC: 23 MMOL/L (ref 23–29)
COLOR UR: YELLOW
CREAT SERPL-MCNC: 4.4 MG/DL (ref 0.5–1.4)
CREAT UR-MCNC: 170.6 MG/DL (ref 23–375)
CTP QC/QA: YES
DIFFERENTIAL METHOD: ABNORMAL
EOSINOPHIL # BLD AUTO: ABNORMAL K/UL (ref 0–0.5)
EOSINOPHIL NFR BLD: 0 % (ref 0–8)
ERYTHROCYTE [DISTWIDTH] IN BLOOD BY AUTOMATED COUNT: 15.9 % (ref 11.5–14.5)
EST. GFR  (AFRICAN AMERICAN): 15 ML/MIN/1.73 M^2
EST. GFR  (NON AFRICAN AMERICAN): 13 ML/MIN/1.73 M^2
GLUCOSE SERPL-MCNC: 84 MG/DL (ref 70–110)
GLUCOSE UR QL STRIP: NEGATIVE
HCT VFR BLD AUTO: 36 % (ref 40–54)
HGB BLD-MCNC: 12.3 G/DL (ref 14–18)
HGB UR QL STRIP: ABNORMAL
HYALINE CASTS #/AREA URNS LPF: 0 /LPF
HYPOCHROMIA BLD QL SMEAR: ABNORMAL
IMM GRANULOCYTES # BLD AUTO: ABNORMAL K/UL (ref 0–0.04)
IMM GRANULOCYTES NFR BLD AUTO: ABNORMAL % (ref 0–0.5)
KETONES UR QL STRIP: ABNORMAL
LACTATE SERPL-SCNC: 1.8 MMOL/L (ref 0.5–2.2)
LACTATE SERPL-SCNC: 2.2 MMOL/L (ref 0.5–2.2)
LEUKOCYTE ESTERASE UR QL STRIP: ABNORMAL
LYMPHOCYTES # BLD AUTO: ABNORMAL K/UL (ref 1–4.8)
LYMPHOCYTES NFR BLD: 10 % (ref 18–48)
MCH RBC QN AUTO: 28.7 PG (ref 27–31)
MCHC RBC AUTO-ENTMCNC: 34.2 G/DL (ref 32–36)
MCV RBC AUTO: 84 FL (ref 82–98)
MICROSCOPIC COMMENT: ABNORMAL
MONOCYTES # BLD AUTO: ABNORMAL K/UL (ref 0.3–1)
MONOCYTES NFR BLD: 7 % (ref 4–15)
NEUTROPHILS NFR BLD: 83 % (ref 38–73)
NITRITE UR QL STRIP: NEGATIVE
NRBC BLD-RTO: 0 /100 WBC
OSMOLALITY UR: 1015 MOSM/KG (ref 50–1200)
PH UR STRIP: 6 [PH] (ref 5–8)
PLATELET # BLD AUTO: 574 K/UL (ref 150–350)
PLATELET BLD QL SMEAR: ABNORMAL
PMV BLD AUTO: 9.9 FL (ref 9.2–12.9)
POTASSIUM SERPL-SCNC: 4.6 MMOL/L (ref 3.5–5.1)
PROT SERPL-MCNC: 8.4 G/DL (ref 6–8.4)
PROT UR QL STRIP: ABNORMAL
PROT UR-MCNC: 19 MG/DL (ref 0–15)
PROT/CREAT UR: 0.11 MG/G{CREAT} (ref 0–0.2)
RBC # BLD AUTO: 4.29 M/UL (ref 4.6–6.2)
RBC #/AREA URNS HPF: 25 /HPF (ref 0–4)
SARS-COV-2 RDRP RESP QL NAA+PROBE: NEGATIVE
SODIUM SERPL-SCNC: 135 MMOL/L (ref 136–145)
SODIUM UR-SCNC: 205 MMOL/L (ref 20–250)
SP GR UR STRIP: 1.01 (ref 1–1.03)
TARGETS BLD QL SMEAR: ABNORMAL
URN SPEC COLLECT METH UR: ABNORMAL
UROBILINOGEN UR STRIP-ACNC: 1 EU/DL
WBC # BLD AUTO: 34.87 K/UL (ref 3.9–12.7)
WBC #/AREA URNS HPF: >100 /HPF (ref 0–5)

## 2021-01-21 PROCEDURE — 93010 EKG 12-LEAD: ICD-10-PCS | Mod: ,,, | Performed by: INTERNAL MEDICINE

## 2021-01-21 PROCEDURE — 94761 N-INVAS EAR/PLS OXIMETRY MLT: CPT

## 2021-01-21 PROCEDURE — 87040 BLOOD CULTURE FOR BACTERIA: CPT | Mod: 59

## 2021-01-21 PROCEDURE — 51701 INSERT BLADDER CATHETER: CPT

## 2021-01-21 PROCEDURE — 51702 INSERT TEMP BLADDER CATH: CPT

## 2021-01-21 PROCEDURE — 36415 COLL VENOUS BLD VENIPUNCTURE: CPT

## 2021-01-21 PROCEDURE — 85027 COMPLETE CBC AUTOMATED: CPT

## 2021-01-21 PROCEDURE — 83935 ASSAY OF URINE OSMOLALITY: CPT

## 2021-01-21 PROCEDURE — 99223 1ST HOSP IP/OBS HIGH 75: CPT | Mod: ,,, | Performed by: PHYSICIAN ASSISTANT

## 2021-01-21 PROCEDURE — 87186 SC STD MICRODIL/AGAR DIL: CPT

## 2021-01-21 PROCEDURE — 85007 BL SMEAR W/DIFF WBC COUNT: CPT

## 2021-01-21 PROCEDURE — 21400001 HC TELEMETRY ROOM

## 2021-01-21 PROCEDURE — 25000003 PHARM REV CODE 250: Performed by: INTERNAL MEDICINE

## 2021-01-21 PROCEDURE — 96360 HYDRATION IV INFUSION INIT: CPT

## 2021-01-21 PROCEDURE — 99223 PR INITIAL HOSPITAL CARE,LEVL III: ICD-10-PCS | Mod: ,,, | Performed by: PHYSICIAN ASSISTANT

## 2021-01-21 PROCEDURE — 25000003 PHARM REV CODE 250: Performed by: EMERGENCY MEDICINE

## 2021-01-21 PROCEDURE — 25000003 PHARM REV CODE 250: Performed by: PHYSICIAN ASSISTANT

## 2021-01-21 PROCEDURE — 80053 COMPREHEN METABOLIC PANEL: CPT

## 2021-01-21 PROCEDURE — 87077 CULTURE AEROBIC IDENTIFY: CPT

## 2021-01-21 PROCEDURE — 93010 ELECTROCARDIOGRAM REPORT: CPT | Mod: ,,, | Performed by: INTERNAL MEDICINE

## 2021-01-21 PROCEDURE — 63600175 PHARM REV CODE 636 W HCPCS: Performed by: PHYSICIAN ASSISTANT

## 2021-01-21 PROCEDURE — 83605 ASSAY OF LACTIC ACID: CPT

## 2021-01-21 PROCEDURE — 84300 ASSAY OF URINE SODIUM: CPT

## 2021-01-21 PROCEDURE — 96361 HYDRATE IV INFUSION ADD-ON: CPT

## 2021-01-21 PROCEDURE — 93005 ELECTROCARDIOGRAM TRACING: CPT

## 2021-01-21 PROCEDURE — U0002 COVID-19 LAB TEST NON-CDC: HCPCS | Performed by: EMERGENCY MEDICINE

## 2021-01-21 PROCEDURE — 84156 ASSAY OF PROTEIN URINE: CPT

## 2021-01-21 PROCEDURE — 63600175 PHARM REV CODE 636 W HCPCS: Performed by: EMERGENCY MEDICINE

## 2021-01-21 PROCEDURE — 99291 CRITICAL CARE FIRST HOUR: CPT | Mod: 25

## 2021-01-21 PROCEDURE — 81000 URINALYSIS NONAUTO W/SCOPE: CPT

## 2021-01-21 PROCEDURE — 87086 URINE CULTURE/COLONY COUNT: CPT

## 2021-01-21 PROCEDURE — 87088 URINE BACTERIA CULTURE: CPT

## 2021-01-21 RX ORDER — VANCOMYCIN HCL IN 5 % DEXTROSE 1G/250ML
15 PLASTIC BAG, INJECTION (ML) INTRAVENOUS ONCE
Status: COMPLETED | OUTPATIENT
Start: 2021-01-21 | End: 2021-01-22

## 2021-01-21 RX ORDER — TALC
6 POWDER (GRAM) TOPICAL NIGHTLY PRN
Status: DISCONTINUED | OUTPATIENT
Start: 2021-01-21 | End: 2021-02-01 | Stop reason: HOSPADM

## 2021-01-21 RX ORDER — MUPIROCIN 20 MG/G
OINTMENT TOPICAL 2 TIMES DAILY
Status: COMPLETED | OUTPATIENT
Start: 2021-01-21 | End: 2021-01-26

## 2021-01-21 RX ORDER — SODIUM CHLORIDE 0.9 % (FLUSH) 0.9 %
10 SYRINGE (ML) INJECTION
Status: DISCONTINUED | OUTPATIENT
Start: 2021-01-21 | End: 2021-02-01 | Stop reason: HOSPADM

## 2021-01-21 RX ORDER — ACETAMINOPHEN 325 MG/1
650 TABLET ORAL EVERY 4 HOURS PRN
Status: DISCONTINUED | OUTPATIENT
Start: 2021-01-21 | End: 2021-02-01 | Stop reason: HOSPADM

## 2021-01-21 RX ORDER — ATENOLOL 25 MG/1
100 TABLET ORAL DAILY
Status: DISCONTINUED | OUTPATIENT
Start: 2021-01-21 | End: 2021-01-22

## 2021-01-21 RX ORDER — TRAMADOL HYDROCHLORIDE 50 MG/1
50 TABLET ORAL EVERY 6 HOURS PRN
Status: DISCONTINUED | OUTPATIENT
Start: 2021-01-21 | End: 2021-02-01 | Stop reason: HOSPADM

## 2021-01-21 RX ORDER — ONDANSETRON 2 MG/ML
4 INJECTION INTRAMUSCULAR; INTRAVENOUS EVERY 8 HOURS PRN
Status: DISCONTINUED | OUTPATIENT
Start: 2021-01-21 | End: 2021-02-01 | Stop reason: HOSPADM

## 2021-01-21 RX ORDER — OXYCODONE AND ACETAMINOPHEN 10; 325 MG/1; MG/1
1 TABLET ORAL 3 TIMES DAILY PRN
Status: DISCONTINUED | OUTPATIENT
Start: 2021-01-21 | End: 2021-02-01 | Stop reason: HOSPADM

## 2021-01-21 RX ADMIN — ATENOLOL 100 MG: 25 TABLET ORAL at 09:01

## 2021-01-21 RX ADMIN — VANCOMYCIN HYDROCHLORIDE 1000 MG: 1 INJECTION, POWDER, LYOPHILIZED, FOR SOLUTION INTRAVENOUS at 11:01

## 2021-01-21 RX ADMIN — MUPIROCIN: 20 OINTMENT TOPICAL at 09:01

## 2021-01-21 RX ADMIN — PIPERACILLIN AND TAZOBACTAM 4.5 G: 4; .5 INJECTION, POWDER, LYOPHILIZED, FOR SOLUTION INTRAVENOUS; PARENTERAL at 09:01

## 2021-01-21 RX ADMIN — SODIUM CHLORIDE, SODIUM LACTATE, POTASSIUM CHLORIDE, AND CALCIUM CHLORIDE 2190 ML: .6; .31; .03; .02 INJECTION, SOLUTION INTRAVENOUS at 04:01

## 2021-01-21 RX ADMIN — ACETAMINOPHEN 650 MG: 325 TABLET, FILM COATED ORAL at 09:01

## 2021-01-22 ENCOUNTER — ANESTHESIA (OUTPATIENT)
Dept: SURGERY | Facility: OTHER | Age: 66
DRG: 659 | End: 2021-01-22
Payer: MEDICARE

## 2021-01-22 ENCOUNTER — ANESTHESIA EVENT (OUTPATIENT)
Dept: SURGERY | Facility: OTHER | Age: 66
DRG: 659 | End: 2021-01-22
Payer: MEDICARE

## 2021-01-22 PROBLEM — N17.9 AKI (ACUTE KIDNEY INJURY): Status: ACTIVE | Noted: 2021-01-22

## 2021-01-22 LAB
ANION GAP SERPL CALC-SCNC: 15 MMOL/L (ref 8–16)
ANISOCYTOSIS BLD QL SMEAR: SLIGHT
BASOPHILS # BLD AUTO: 0.16 K/UL (ref 0–0.2)
BASOPHILS NFR BLD: 0.4 % (ref 0–1.9)
BUN SERPL-MCNC: 80 MG/DL (ref 8–23)
CALCIUM SERPL-MCNC: 8.9 MG/DL (ref 8.7–10.5)
CHLORIDE SERPL-SCNC: 99 MMOL/L (ref 95–110)
CO2 SERPL-SCNC: 23 MMOL/L (ref 23–29)
CREAT SERPL-MCNC: 3.5 MG/DL (ref 0.5–1.4)
DIFFERENTIAL METHOD: ABNORMAL
EOSINOPHIL # BLD AUTO: 0 K/UL (ref 0–0.5)
EOSINOPHIL NFR BLD: 0 % (ref 0–8)
ERYTHROCYTE [DISTWIDTH] IN BLOOD BY AUTOMATED COUNT: 15.9 % (ref 11.5–14.5)
EST. GFR  (AFRICAN AMERICAN): 20 ML/MIN/1.73 M^2
EST. GFR  (NON AFRICAN AMERICAN): 18 ML/MIN/1.73 M^2
GLUCOSE SERPL-MCNC: 95 MG/DL (ref 70–110)
HCT VFR BLD AUTO: 30.7 % (ref 40–54)
HGB BLD-MCNC: 10.9 G/DL (ref 14–18)
HYPOCHROMIA BLD QL SMEAR: ABNORMAL
IMM GRANULOCYTES # BLD AUTO: 0.95 K/UL (ref 0–0.04)
IMM GRANULOCYTES NFR BLD AUTO: 2.4 % (ref 0–0.5)
LYMPHOCYTES # BLD AUTO: 3.2 K/UL (ref 1–4.8)
LYMPHOCYTES NFR BLD: 8 % (ref 18–48)
MCH RBC QN AUTO: 29.8 PG (ref 27–31)
MCHC RBC AUTO-ENTMCNC: 35.5 G/DL (ref 32–36)
MCV RBC AUTO: 84 FL (ref 82–98)
MONOCYTES # BLD AUTO: 2.1 K/UL (ref 0.3–1)
MONOCYTES NFR BLD: 5.2 % (ref 4–15)
NEUTROPHILS # BLD AUTO: 33.7 K/UL (ref 1.8–7.7)
NEUTROPHILS NFR BLD: 84 % (ref 38–73)
NRBC BLD-RTO: 0 /100 WBC
PLATELET # BLD AUTO: 478 K/UL (ref 150–350)
PMV BLD AUTO: 10.2 FL (ref 9.2–12.9)
POTASSIUM SERPL-SCNC: 4.1 MMOL/L (ref 3.5–5.1)
RBC # BLD AUTO: 3.66 M/UL (ref 4.6–6.2)
SODIUM SERPL-SCNC: 137 MMOL/L (ref 136–145)
TARGETS BLD QL SMEAR: ABNORMAL
URATE SERPL-MCNC: 11.1 MG/DL (ref 3.4–7)
WBC # BLD AUTO: 40.08 K/UL (ref 3.9–12.7)

## 2021-01-22 PROCEDURE — 63600175 PHARM REV CODE 636 W HCPCS: Performed by: NURSE ANESTHETIST, CERTIFIED REGISTERED

## 2021-01-22 PROCEDURE — 25500020 PHARM REV CODE 255: Performed by: UROLOGY

## 2021-01-22 PROCEDURE — 51600 PR INJECTION FOR BLADDER X-RAY: ICD-10-PCS | Mod: 51,RT,, | Performed by: UROLOGY

## 2021-01-22 PROCEDURE — 36000706: Performed by: UROLOGY

## 2021-01-22 PROCEDURE — 25000003 PHARM REV CODE 250: Performed by: PHYSICIAN ASSISTANT

## 2021-01-22 PROCEDURE — 37000009 HC ANESTHESIA EA ADD 15 MINS: Performed by: UROLOGY

## 2021-01-22 PROCEDURE — 80048 BASIC METABOLIC PNL TOTAL CA: CPT

## 2021-01-22 PROCEDURE — 51600 INJECTION FOR BLADDER X-RAY: CPT | Mod: 51,RT,, | Performed by: UROLOGY

## 2021-01-22 PROCEDURE — C2617 STENT, NON-COR, TEM W/O DEL: HCPCS | Performed by: UROLOGY

## 2021-01-22 PROCEDURE — 63600175 PHARM REV CODE 636 W HCPCS: Performed by: UROLOGY

## 2021-01-22 PROCEDURE — 99233 SBSQ HOSP IP/OBS HIGH 50: CPT | Mod: ,,, | Performed by: INTERNAL MEDICINE

## 2021-01-22 PROCEDURE — 99233 PR SUBSEQUENT HOSPITAL CARE,LEVL III: ICD-10-PCS | Mod: ,,, | Performed by: INTERNAL MEDICINE

## 2021-01-22 PROCEDURE — 36415 COLL VENOUS BLD VENIPUNCTURE: CPT

## 2021-01-22 PROCEDURE — 25000003 PHARM REV CODE 250: Performed by: INTERNAL MEDICINE

## 2021-01-22 PROCEDURE — 84550 ASSAY OF BLOOD/URIC ACID: CPT

## 2021-01-22 PROCEDURE — 85025 COMPLETE CBC W/AUTO DIFF WBC: CPT

## 2021-01-22 PROCEDURE — 21400001 HC TELEMETRY ROOM

## 2021-01-22 PROCEDURE — 36000707: Performed by: UROLOGY

## 2021-01-22 PROCEDURE — 25000003 PHARM REV CODE 250: Performed by: UROLOGY

## 2021-01-22 PROCEDURE — 63600175 PHARM REV CODE 636 W HCPCS: Performed by: PHYSICIAN ASSISTANT

## 2021-01-22 PROCEDURE — 74420 PR  X-RAY RETROGRADE PYELOGRAM: ICD-10-PCS | Mod: 26,,, | Performed by: UROLOGY

## 2021-01-22 PROCEDURE — 74420 UROGRAPHY RTRGR +-KUB: CPT | Mod: 26,,, | Performed by: UROLOGY

## 2021-01-22 PROCEDURE — 52332 PR CYSTOSCOPY,INSERT URETERAL STENT: ICD-10-PCS | Mod: RT,,, | Performed by: UROLOGY

## 2021-01-22 PROCEDURE — 71000033 HC RECOVERY, INTIAL HOUR: Performed by: UROLOGY

## 2021-01-22 PROCEDURE — 52332 CYSTOSCOPY AND TREATMENT: CPT | Mod: RT,,, | Performed by: UROLOGY

## 2021-01-22 PROCEDURE — 37000008 HC ANESTHESIA 1ST 15 MINUTES: Performed by: UROLOGY

## 2021-01-22 PROCEDURE — 25000003 PHARM REV CODE 250: Performed by: NURSE PRACTITIONER

## 2021-01-22 DEVICE — STENT URETERAL UNIV 6FR 28CM: Type: IMPLANTABLE DEVICE | Site: KIDNEY | Status: FUNCTIONAL

## 2021-01-22 RX ORDER — ONDANSETRON 2 MG/ML
4 INJECTION INTRAMUSCULAR; INTRAVENOUS DAILY PRN
Status: DISCONTINUED | OUTPATIENT
Start: 2021-01-22 | End: 2021-01-27 | Stop reason: HOSPADM

## 2021-01-22 RX ORDER — SODIUM CHLORIDE 0.9 % (FLUSH) 0.9 %
3 SYRINGE (ML) INJECTION
Status: DISCONTINUED | OUTPATIENT
Start: 2021-01-22 | End: 2021-02-01 | Stop reason: HOSPADM

## 2021-01-22 RX ORDER — OXYCODONE HYDROCHLORIDE 5 MG/1
5 TABLET ORAL
Status: DISCONTINUED | OUTPATIENT
Start: 2021-01-22 | End: 2021-01-27 | Stop reason: HOSPADM

## 2021-01-22 RX ORDER — FENTANYL CITRATE 50 UG/ML
INJECTION, SOLUTION INTRAMUSCULAR; INTRAVENOUS
Status: DISCONTINUED | OUTPATIENT
Start: 2021-01-22 | End: 2021-01-22

## 2021-01-22 RX ORDER — BISACODYL 10 MG
10 SUPPOSITORY, RECTAL RECTAL DAILY
Status: DISCONTINUED | OUTPATIENT
Start: 2021-01-22 | End: 2021-01-24

## 2021-01-22 RX ORDER — PROPOFOL 10 MG/ML
VIAL (ML) INTRAVENOUS
Status: DISCONTINUED | OUTPATIENT
Start: 2021-01-22 | End: 2021-01-22

## 2021-01-22 RX ORDER — PROPOFOL 10 MG/ML
VIAL (ML) INTRAVENOUS CONTINUOUS PRN
Status: DISCONTINUED | OUTPATIENT
Start: 2021-01-22 | End: 2021-01-22

## 2021-01-22 RX ORDER — FAMOTIDINE 20 MG/1
20 TABLET, FILM COATED ORAL DAILY
Status: DISCONTINUED | OUTPATIENT
Start: 2021-01-22 | End: 2021-01-25

## 2021-01-22 RX ORDER — SODIUM CHLORIDE 9 MG/ML
INJECTION, SOLUTION INTRAVENOUS CONTINUOUS
Status: DISCONTINUED | OUTPATIENT
Start: 2021-01-22 | End: 2021-01-28

## 2021-01-22 RX ORDER — HYDROMORPHONE HYDROCHLORIDE 2 MG/ML
0.4 INJECTION, SOLUTION INTRAMUSCULAR; INTRAVENOUS; SUBCUTANEOUS EVERY 5 MIN PRN
Status: DISCONTINUED | OUTPATIENT
Start: 2021-01-22 | End: 2021-01-27 | Stop reason: HOSPADM

## 2021-01-22 RX ORDER — MEPERIDINE HYDROCHLORIDE 25 MG/ML
12.5 INJECTION INTRAMUSCULAR; INTRAVENOUS; SUBCUTANEOUS ONCE AS NEEDED
Status: DISCONTINUED | OUTPATIENT
Start: 2021-01-22 | End: 2021-01-23 | Stop reason: HOSPADM

## 2021-01-22 RX ADMIN — TRAMADOL HYDROCHLORIDE 50 MG: 50 TABLET ORAL at 05:01

## 2021-01-22 RX ADMIN — FENTANYL CITRATE 25 MCG: 50 INJECTION, SOLUTION INTRAMUSCULAR; INTRAVENOUS at 11:01

## 2021-01-22 RX ADMIN — FENTANYL CITRATE 50 MCG: 50 INJECTION, SOLUTION INTRAMUSCULAR; INTRAVENOUS at 11:01

## 2021-01-22 RX ADMIN — PIPERACILLIN AND TAZOBACTAM 4.5 G: 4; .5 INJECTION, POWDER, LYOPHILIZED, FOR SOLUTION INTRAVENOUS; PARENTERAL at 10:01

## 2021-01-22 RX ADMIN — BISACODYL 10 MG: 10 SUPPOSITORY RECTAL at 01:01

## 2021-01-22 RX ADMIN — PROPOFOL 50 MG: 10 INJECTION, EMULSION INTRAVENOUS at 11:01

## 2021-01-22 RX ADMIN — PROPOFOL 20 MG: 10 INJECTION, EMULSION INTRAVENOUS at 11:01

## 2021-01-22 RX ADMIN — OXYCODONE HYDROCHLORIDE AND ACETAMINOPHEN 1 TABLET: 10; 325 TABLET ORAL at 10:01

## 2021-01-22 RX ADMIN — MUPIROCIN: 20 OINTMENT TOPICAL at 08:01

## 2021-01-22 RX ADMIN — SODIUM CHLORIDE 500 ML: 0.9 INJECTION, SOLUTION INTRAVENOUS at 01:01

## 2021-01-22 RX ADMIN — MUPIROCIN: 20 OINTMENT TOPICAL at 10:01

## 2021-01-22 RX ADMIN — SODIUM CHLORIDE 100 ML/HR: 0.9 INJECTION, SOLUTION INTRAVENOUS at 08:01

## 2021-01-22 RX ADMIN — OXYCODONE HYDROCHLORIDE AND ACETAMINOPHEN 1 TABLET: 10; 325 TABLET ORAL at 01:01

## 2021-01-22 RX ADMIN — PROPOFOL 50 MCG/KG/MIN: 10 INJECTION, EMULSION INTRAVENOUS at 11:01

## 2021-01-22 RX ADMIN — ACETAMINOPHEN 650 MG: 325 TABLET, FILM COATED ORAL at 05:01

## 2021-01-22 RX ADMIN — FAMOTIDINE 20 MG: 20 TABLET ORAL at 01:01

## 2021-01-23 PROBLEM — N31.9 NEUROGENIC BLADDER: Status: ACTIVE | Noted: 2021-01-23

## 2021-01-23 PROBLEM — N13.30 HYDRONEPHROSIS OF RIGHT KIDNEY: Status: ACTIVE | Noted: 2021-01-23

## 2021-01-23 LAB
ANION GAP SERPL CALC-SCNC: 15 MMOL/L (ref 8–16)
BASOPHILS # BLD AUTO: 0.13 K/UL (ref 0–0.2)
BASOPHILS NFR BLD: 0.4 % (ref 0–1.9)
BUN SERPL-MCNC: 61 MG/DL (ref 8–23)
CALCIUM SERPL-MCNC: 8.7 MG/DL (ref 8.7–10.5)
CHLORIDE SERPL-SCNC: 103 MMOL/L (ref 95–110)
CO2 SERPL-SCNC: 21 MMOL/L (ref 23–29)
CREAT SERPL-MCNC: 2.6 MG/DL (ref 0.5–1.4)
DIFFERENTIAL METHOD: ABNORMAL
EOSINOPHIL # BLD AUTO: 0.1 K/UL (ref 0–0.5)
EOSINOPHIL NFR BLD: 0.3 % (ref 0–8)
ERYTHROCYTE [DISTWIDTH] IN BLOOD BY AUTOMATED COUNT: 16.4 % (ref 11.5–14.5)
EST. GFR  (AFRICAN AMERICAN): 29 ML/MIN/1.73 M^2
EST. GFR  (NON AFRICAN AMERICAN): 25 ML/MIN/1.73 M^2
GLUCOSE SERPL-MCNC: 63 MG/DL (ref 70–110)
HCT VFR BLD AUTO: 32.5 % (ref 40–54)
HGB BLD-MCNC: 11.1 G/DL (ref 14–18)
IMM GRANULOCYTES # BLD AUTO: 1.4 K/UL (ref 0–0.04)
IMM GRANULOCYTES NFR BLD AUTO: 3.9 % (ref 0–0.5)
LYMPHOCYTES # BLD AUTO: 3 K/UL (ref 1–4.8)
LYMPHOCYTES NFR BLD: 8.4 % (ref 18–48)
MCH RBC QN AUTO: 29 PG (ref 27–31)
MCHC RBC AUTO-ENTMCNC: 34.2 G/DL (ref 32–36)
MCV RBC AUTO: 85 FL (ref 82–98)
MONOCYTES # BLD AUTO: 1.6 K/UL (ref 0.3–1)
MONOCYTES NFR BLD: 4.5 % (ref 4–15)
NEUTROPHILS # BLD AUTO: 29.4 K/UL (ref 1.8–7.7)
NEUTROPHILS NFR BLD: 82.5 % (ref 38–73)
NRBC BLD-RTO: 0 /100 WBC
PLATELET # BLD AUTO: 498 K/UL (ref 150–350)
PMV BLD AUTO: 9.8 FL (ref 9.2–12.9)
POTASSIUM SERPL-SCNC: 4.4 MMOL/L (ref 3.5–5.1)
RBC # BLD AUTO: 3.83 M/UL (ref 4.6–6.2)
SODIUM SERPL-SCNC: 139 MMOL/L (ref 136–145)
WBC # BLD AUTO: 35.63 K/UL (ref 3.9–12.7)

## 2021-01-23 PROCEDURE — 51700 IRRIGATION OF BLADDER: CPT | Mod: ,,, | Performed by: UROLOGY

## 2021-01-23 PROCEDURE — 25000003 PHARM REV CODE 250: Performed by: UROLOGY

## 2021-01-23 PROCEDURE — 63600175 PHARM REV CODE 636 W HCPCS: Performed by: UROLOGY

## 2021-01-23 PROCEDURE — 99233 SBSQ HOSP IP/OBS HIGH 50: CPT | Mod: ,,, | Performed by: INTERNAL MEDICINE

## 2021-01-23 PROCEDURE — 63600175 PHARM REV CODE 636 W HCPCS: Performed by: INTERNAL MEDICINE

## 2021-01-23 PROCEDURE — 21400001 HC TELEMETRY ROOM

## 2021-01-23 PROCEDURE — 80048 BASIC METABOLIC PNL TOTAL CA: CPT

## 2021-01-23 PROCEDURE — 25000003 PHARM REV CODE 250: Performed by: PHYSICIAN ASSISTANT

## 2021-01-23 PROCEDURE — 99233 PR SUBSEQUENT HOSPITAL CARE,LEVL III: ICD-10-PCS | Mod: ,,, | Performed by: INTERNAL MEDICINE

## 2021-01-23 PROCEDURE — 36415 COLL VENOUS BLD VENIPUNCTURE: CPT

## 2021-01-23 PROCEDURE — 51700 PR IRRIGATION, BLADDER: ICD-10-PCS | Mod: ,,, | Performed by: UROLOGY

## 2021-01-23 PROCEDURE — 94761 N-INVAS EAR/PLS OXIMETRY MLT: CPT

## 2021-01-23 PROCEDURE — 25000003 PHARM REV CODE 250: Performed by: INTERNAL MEDICINE

## 2021-01-23 PROCEDURE — 99233 SBSQ HOSP IP/OBS HIGH 50: CPT | Mod: 25,,, | Performed by: UROLOGY

## 2021-01-23 PROCEDURE — 99233 PR SUBSEQUENT HOSPITAL CARE,LEVL III: ICD-10-PCS | Mod: 25,,, | Performed by: UROLOGY

## 2021-01-23 PROCEDURE — 85025 COMPLETE CBC W/AUTO DIFF WBC: CPT

## 2021-01-23 RX ADMIN — FAMOTIDINE 20 MG: 20 TABLET ORAL at 08:01

## 2021-01-23 RX ADMIN — SODIUM CHLORIDE: 0.9 INJECTION, SOLUTION INTRAVENOUS at 07:01

## 2021-01-23 RX ADMIN — MUPIROCIN: 20 OINTMENT TOPICAL at 08:01

## 2021-01-23 RX ADMIN — BISACODYL 10 MG: 10 SUPPOSITORY RECTAL at 08:01

## 2021-01-23 RX ADMIN — PIPERACILLIN AND TAZOBACTAM 4.5 G: 4; .5 INJECTION, POWDER, LYOPHILIZED, FOR SOLUTION INTRAVENOUS; PARENTERAL at 07:01

## 2021-01-23 RX ADMIN — TRAMADOL HYDROCHLORIDE 50 MG: 50 TABLET ORAL at 06:01

## 2021-01-23 RX ADMIN — PIPERACILLIN AND TAZOBACTAM 4.5 G: 4; .5 INJECTION, POWDER, LYOPHILIZED, FOR SOLUTION INTRAVENOUS; PARENTERAL at 09:01

## 2021-01-24 LAB
ANION GAP SERPL CALC-SCNC: 15 MMOL/L (ref 8–16)
BASOPHILS # BLD AUTO: ABNORMAL K/UL (ref 0–0.2)
BASOPHILS NFR BLD: 0 % (ref 0–1.9)
BUN SERPL-MCNC: 39 MG/DL (ref 8–23)
CALCIUM SERPL-MCNC: 8.6 MG/DL (ref 8.7–10.5)
CHLORIDE SERPL-SCNC: 103 MMOL/L (ref 95–110)
CO2 SERPL-SCNC: 22 MMOL/L (ref 23–29)
CREAT SERPL-MCNC: 1.7 MG/DL (ref 0.5–1.4)
DIFFERENTIAL METHOD: ABNORMAL
EOSINOPHIL # BLD AUTO: ABNORMAL K/UL (ref 0–0.5)
EOSINOPHIL NFR BLD: 0 % (ref 0–8)
ERYTHROCYTE [DISTWIDTH] IN BLOOD BY AUTOMATED COUNT: 16.4 % (ref 11.5–14.5)
EST. GFR  (AFRICAN AMERICAN): 49 ML/MIN/1.73 M^2
EST. GFR  (NON AFRICAN AMERICAN): 42 ML/MIN/1.73 M^2
GLUCOSE SERPL-MCNC: 72 MG/DL (ref 70–110)
HCT VFR BLD AUTO: 28.5 % (ref 40–54)
HGB BLD-MCNC: 9.7 G/DL (ref 14–18)
HYPOCHROMIA BLD QL SMEAR: ABNORMAL
IMM GRANULOCYTES # BLD AUTO: ABNORMAL K/UL (ref 0–0.04)
IMM GRANULOCYTES NFR BLD AUTO: ABNORMAL % (ref 0–0.5)
LYMPHOCYTES # BLD AUTO: ABNORMAL K/UL (ref 1–4.8)
LYMPHOCYTES NFR BLD: 14 % (ref 18–48)
MCH RBC QN AUTO: 29 PG (ref 27–31)
MCHC RBC AUTO-ENTMCNC: 34 G/DL (ref 32–36)
MCV RBC AUTO: 85 FL (ref 82–98)
MONOCYTES # BLD AUTO: ABNORMAL K/UL (ref 0.3–1)
MONOCYTES NFR BLD: 4 % (ref 4–15)
NEUTROPHILS # BLD AUTO: ABNORMAL K/UL (ref 1.8–7.7)
NEUTROPHILS NFR BLD: 79 % (ref 38–73)
NEUTS BAND NFR BLD MANUAL: 3 %
NRBC BLD-RTO: 0 /100 WBC
PLATELET # BLD AUTO: 482 K/UL (ref 150–350)
PLATELET BLD QL SMEAR: ABNORMAL
PMV BLD AUTO: 9.5 FL (ref 9.2–12.9)
POLYCHROMASIA BLD QL SMEAR: ABNORMAL
POTASSIUM SERPL-SCNC: 3.5 MMOL/L (ref 3.5–5.1)
RBC # BLD AUTO: 3.35 M/UL (ref 4.6–6.2)
SODIUM SERPL-SCNC: 140 MMOL/L (ref 136–145)
TARGETS BLD QL SMEAR: ABNORMAL
WBC # BLD AUTO: 38.77 K/UL (ref 3.9–12.7)

## 2021-01-24 PROCEDURE — 25000003 PHARM REV CODE 250: Performed by: PHYSICIAN ASSISTANT

## 2021-01-24 PROCEDURE — 63600175 PHARM REV CODE 636 W HCPCS: Performed by: ANESTHESIOLOGY

## 2021-01-24 PROCEDURE — 99232 SBSQ HOSP IP/OBS MODERATE 35: CPT | Mod: ,,, | Performed by: UROLOGY

## 2021-01-24 PROCEDURE — 99232 PR SUBSEQUENT HOSPITAL CARE,LEVL II: ICD-10-PCS | Mod: ,,, | Performed by: UROLOGY

## 2021-01-24 PROCEDURE — 25000003 PHARM REV CODE 250: Performed by: INTERNAL MEDICINE

## 2021-01-24 PROCEDURE — 99233 PR SUBSEQUENT HOSPITAL CARE,LEVL III: ICD-10-PCS | Mod: ,,, | Performed by: INTERNAL MEDICINE

## 2021-01-24 PROCEDURE — 21400001 HC TELEMETRY ROOM

## 2021-01-24 PROCEDURE — 25000003 PHARM REV CODE 250: Performed by: UROLOGY

## 2021-01-24 PROCEDURE — 94761 N-INVAS EAR/PLS OXIMETRY MLT: CPT

## 2021-01-24 PROCEDURE — 85027 COMPLETE CBC AUTOMATED: CPT

## 2021-01-24 PROCEDURE — 99233 SBSQ HOSP IP/OBS HIGH 50: CPT | Mod: ,,, | Performed by: INTERNAL MEDICINE

## 2021-01-24 PROCEDURE — 85007 BL SMEAR W/DIFF WBC COUNT: CPT

## 2021-01-24 PROCEDURE — 80048 BASIC METABOLIC PNL TOTAL CA: CPT

## 2021-01-24 PROCEDURE — 36415 COLL VENOUS BLD VENIPUNCTURE: CPT

## 2021-01-24 PROCEDURE — 63600175 PHARM REV CODE 636 W HCPCS: Performed by: INTERNAL MEDICINE

## 2021-01-24 RX ORDER — SIMETHICONE 80 MG
1 TABLET,CHEWABLE ORAL 3 TIMES DAILY PRN
Status: DISCONTINUED | OUTPATIENT
Start: 2021-01-24 | End: 2021-02-01 | Stop reason: HOSPADM

## 2021-01-24 RX ORDER — BISACODYL 10 MG
10 SUPPOSITORY, RECTAL RECTAL DAILY PRN
Status: DISCONTINUED | OUTPATIENT
Start: 2021-01-24 | End: 2021-02-01 | Stop reason: HOSPADM

## 2021-01-24 RX ADMIN — PIPERACILLIN AND TAZOBACTAM 4.5 G: 4; .5 INJECTION, POWDER, LYOPHILIZED, FOR SOLUTION INTRAVENOUS; PARENTERAL at 07:01

## 2021-01-24 RX ADMIN — OXYCODONE HYDROCHLORIDE AND ACETAMINOPHEN 1 TABLET: 10; 325 TABLET ORAL at 08:01

## 2021-01-24 RX ADMIN — Medication 6 MG: at 11:01

## 2021-01-24 RX ADMIN — SIMETHICONE 80 MG: 80 TABLET, CHEWABLE ORAL at 05:01

## 2021-01-24 RX ADMIN — PIPERACILLIN AND TAZOBACTAM 4.5 G: 4; .5 INJECTION, POWDER, LYOPHILIZED, FOR SOLUTION INTRAVENOUS; PARENTERAL at 11:01

## 2021-01-24 RX ADMIN — ONDANSETRON 4 MG: 2 INJECTION INTRAMUSCULAR; INTRAVENOUS at 05:01

## 2021-01-24 RX ADMIN — MUPIROCIN: 20 OINTMENT TOPICAL at 08:01

## 2021-01-24 RX ADMIN — FAMOTIDINE 20 MG: 20 TABLET ORAL at 08:01

## 2021-01-24 RX ADMIN — SODIUM CHLORIDE: 0.9 INJECTION, SOLUTION INTRAVENOUS at 05:01

## 2021-01-24 RX ADMIN — PIPERACILLIN AND TAZOBACTAM 4.5 G: 4; .5 INJECTION, POWDER, LYOPHILIZED, FOR SOLUTION INTRAVENOUS; PARENTERAL at 04:01

## 2021-01-25 LAB
ANION GAP SERPL CALC-SCNC: 13 MMOL/L (ref 8–16)
ANISOCYTOSIS BLD QL SMEAR: SLIGHT
BACTERIA UR CULT: ABNORMAL
BASOPHILS # BLD AUTO: 0.13 K/UL (ref 0–0.2)
BASOPHILS NFR BLD: 0.4 % (ref 0–1.9)
BUN SERPL-MCNC: 25 MG/DL (ref 8–23)
CALCIUM SERPL-MCNC: 8.6 MG/DL (ref 8.7–10.5)
CHLORIDE SERPL-SCNC: 104 MMOL/L (ref 95–110)
CO2 SERPL-SCNC: 23 MMOL/L (ref 23–29)
CREAT SERPL-MCNC: 1.3 MG/DL (ref 0.5–1.4)
DIFFERENTIAL METHOD: ABNORMAL
EOSINOPHIL # BLD AUTO: 0.7 K/UL (ref 0–0.5)
EOSINOPHIL NFR BLD: 2.3 % (ref 0–8)
ERYTHROCYTE [DISTWIDTH] IN BLOOD BY AUTOMATED COUNT: 16.6 % (ref 11.5–14.5)
EST. GFR  (AFRICAN AMERICAN): >60 ML/MIN/1.73 M^2
EST. GFR  (NON AFRICAN AMERICAN): 58 ML/MIN/1.73 M^2
GLUCOSE SERPL-MCNC: 82 MG/DL (ref 70–110)
HCT VFR BLD AUTO: 31.2 % (ref 40–54)
HGB BLD-MCNC: 10.4 G/DL (ref 14–18)
HYPOCHROMIA BLD QL SMEAR: ABNORMAL
IMM GRANULOCYTES # BLD AUTO: 1.3 K/UL (ref 0–0.04)
IMM GRANULOCYTES NFR BLD AUTO: 4.3 % (ref 0–0.5)
LYMPHOCYTES # BLD AUTO: 2.8 K/UL (ref 1–4.8)
LYMPHOCYTES NFR BLD: 9.4 % (ref 18–48)
MAGNESIUM SERPL-MCNC: 1.7 MG/DL (ref 1.6–2.6)
MCH RBC QN AUTO: 29.2 PG (ref 27–31)
MCHC RBC AUTO-ENTMCNC: 33.3 G/DL (ref 32–36)
MCV RBC AUTO: 88 FL (ref 82–98)
MONOCYTES # BLD AUTO: 2 K/UL (ref 0.3–1)
MONOCYTES NFR BLD: 6.6 % (ref 4–15)
NEUTROPHILS # BLD AUTO: 23.2 K/UL (ref 1.8–7.7)
NEUTROPHILS NFR BLD: 77 % (ref 38–73)
NRBC BLD-RTO: 1 /100 WBC
PLATELET # BLD AUTO: 517 K/UL (ref 150–350)
PLATELET BLD QL SMEAR: ABNORMAL
PMV BLD AUTO: 9.4 FL (ref 9.2–12.9)
POTASSIUM SERPL-SCNC: 3.3 MMOL/L (ref 3.5–5.1)
RBC # BLD AUTO: 3.56 M/UL (ref 4.6–6.2)
SODIUM SERPL-SCNC: 140 MMOL/L (ref 136–145)
TARGETS BLD QL SMEAR: ABNORMAL
WBC # BLD AUTO: 30.09 K/UL (ref 3.9–12.7)

## 2021-01-25 PROCEDURE — 99233 PR SUBSEQUENT HOSPITAL CARE,LEVL III: ICD-10-PCS | Mod: ,,, | Performed by: INTERNAL MEDICINE

## 2021-01-25 PROCEDURE — 99233 SBSQ HOSP IP/OBS HIGH 50: CPT | Mod: ,,, | Performed by: UROLOGY

## 2021-01-25 PROCEDURE — 25000003 PHARM REV CODE 250: Performed by: INTERNAL MEDICINE

## 2021-01-25 PROCEDURE — 63600175 PHARM REV CODE 636 W HCPCS: Performed by: ANESTHESIOLOGY

## 2021-01-25 PROCEDURE — 94761 N-INVAS EAR/PLS OXIMETRY MLT: CPT

## 2021-01-25 PROCEDURE — 21400001 HC TELEMETRY ROOM

## 2021-01-25 PROCEDURE — 85025 COMPLETE CBC W/AUTO DIFF WBC: CPT

## 2021-01-25 PROCEDURE — 36415 COLL VENOUS BLD VENIPUNCTURE: CPT

## 2021-01-25 PROCEDURE — 83735 ASSAY OF MAGNESIUM: CPT

## 2021-01-25 PROCEDURE — 25000003 PHARM REV CODE 250: Performed by: UROLOGY

## 2021-01-25 PROCEDURE — 99233 SBSQ HOSP IP/OBS HIGH 50: CPT | Mod: ,,, | Performed by: INTERNAL MEDICINE

## 2021-01-25 PROCEDURE — 80048 BASIC METABOLIC PNL TOTAL CA: CPT

## 2021-01-25 PROCEDURE — 25000003 PHARM REV CODE 250: Performed by: ANESTHESIOLOGY

## 2021-01-25 PROCEDURE — 25000003 PHARM REV CODE 250: Performed by: PHYSICIAN ASSISTANT

## 2021-01-25 PROCEDURE — 99233 PR SUBSEQUENT HOSPITAL CARE,LEVL III: ICD-10-PCS | Mod: ,,, | Performed by: UROLOGY

## 2021-01-25 PROCEDURE — 63600175 PHARM REV CODE 636 W HCPCS: Performed by: INTERNAL MEDICINE

## 2021-01-25 RX ORDER — FAMOTIDINE 20 MG/1
20 TABLET, FILM COATED ORAL 2 TIMES DAILY
Status: DISCONTINUED | OUTPATIENT
Start: 2021-01-25 | End: 2021-02-01 | Stop reason: HOSPADM

## 2021-01-25 RX ORDER — POTASSIUM CHLORIDE 20 MEQ/1
40 TABLET, EXTENDED RELEASE ORAL
Status: COMPLETED | OUTPATIENT
Start: 2021-01-25 | End: 2021-01-25

## 2021-01-25 RX ADMIN — POTASSIUM CHLORIDE 40 MEQ: 1500 TABLET, EXTENDED RELEASE ORAL at 10:01

## 2021-01-25 RX ADMIN — MUPIROCIN: 20 OINTMENT TOPICAL at 09:01

## 2021-01-25 RX ADMIN — OXYCODONE HYDROCHLORIDE AND ACETAMINOPHEN 1 TABLET: 10; 325 TABLET ORAL at 02:01

## 2021-01-25 RX ADMIN — PROMETHAZINE HYDROCHLORIDE 6.25 MG: 25 INJECTION INTRAMUSCULAR; INTRAVENOUS at 04:01

## 2021-01-25 RX ADMIN — SODIUM CHLORIDE: 0.9 INJECTION, SOLUTION INTRAVENOUS at 04:01

## 2021-01-25 RX ADMIN — ACETAMINOPHEN 650 MG: 325 TABLET, FILM COATED ORAL at 06:01

## 2021-01-25 RX ADMIN — POTASSIUM CHLORIDE 40 MEQ: 1500 TABLET, EXTENDED RELEASE ORAL at 09:01

## 2021-01-25 RX ADMIN — PIPERACILLIN AND TAZOBACTAM 4.5 G: 4; .5 INJECTION, POWDER, LYOPHILIZED, FOR SOLUTION INTRAVENOUS; PARENTERAL at 02:01

## 2021-01-25 RX ADMIN — PIPERACILLIN AND TAZOBACTAM 4.5 G: 4; .5 INJECTION, POWDER, LYOPHILIZED, FOR SOLUTION INTRAVENOUS; PARENTERAL at 10:01

## 2021-01-25 RX ADMIN — PIPERACILLIN AND TAZOBACTAM 4.5 G: 4; .5 INJECTION, POWDER, LYOPHILIZED, FOR SOLUTION INTRAVENOUS; PARENTERAL at 07:01

## 2021-01-26 PROBLEM — N31.9 NEUROGENIC BLADDER: Status: RESOLVED | Noted: 2021-01-23 | Resolved: 2021-01-26

## 2021-01-26 LAB
ANION GAP SERPL CALC-SCNC: 12 MMOL/L (ref 8–16)
ANISOCYTOSIS BLD QL SMEAR: SLIGHT
BACTERIA BLD CULT: NORMAL
BACTERIA BLD CULT: NORMAL
BASOPHILS # BLD AUTO: 0.11 K/UL (ref 0–0.2)
BASOPHILS NFR BLD: 0.5 % (ref 0–1.9)
BUN SERPL-MCNC: 17 MG/DL (ref 8–23)
CALCIUM SERPL-MCNC: 8.6 MG/DL (ref 8.7–10.5)
CHLORIDE SERPL-SCNC: 108 MMOL/L (ref 95–110)
CO2 SERPL-SCNC: 23 MMOL/L (ref 23–29)
CREAT SERPL-MCNC: 1 MG/DL (ref 0.5–1.4)
DIFFERENTIAL METHOD: ABNORMAL
EOSINOPHIL # BLD AUTO: 0.7 K/UL (ref 0–0.5)
EOSINOPHIL NFR BLD: 3 % (ref 0–8)
ERYTHROCYTE [DISTWIDTH] IN BLOOD BY AUTOMATED COUNT: 17 % (ref 11.5–14.5)
EST. GFR  (AFRICAN AMERICAN): >60 ML/MIN/1.73 M^2
EST. GFR  (NON AFRICAN AMERICAN): >60 ML/MIN/1.73 M^2
GLUCOSE SERPL-MCNC: 63 MG/DL (ref 70–110)
HCT VFR BLD AUTO: 30.9 % (ref 40–54)
HGB BLD-MCNC: 10.2 G/DL (ref 14–18)
HYPOCHROMIA BLD QL SMEAR: ABNORMAL
IMM GRANULOCYTES # BLD AUTO: 0.77 K/UL (ref 0–0.04)
IMM GRANULOCYTES NFR BLD AUTO: 3.2 % (ref 0–0.5)
LYMPHOCYTES # BLD AUTO: 3.2 K/UL (ref 1–4.8)
LYMPHOCYTES NFR BLD: 13.4 % (ref 18–48)
MCH RBC QN AUTO: 29.1 PG (ref 27–31)
MCHC RBC AUTO-ENTMCNC: 33 G/DL (ref 32–36)
MCV RBC AUTO: 88 FL (ref 82–98)
MONOCYTES # BLD AUTO: 2.1 K/UL (ref 0.3–1)
MONOCYTES NFR BLD: 8.9 % (ref 4–15)
NEUTROPHILS # BLD AUTO: 17 K/UL (ref 1.8–7.7)
NEUTROPHILS NFR BLD: 71 % (ref 38–73)
NRBC BLD-RTO: 1 /100 WBC
PLATELET # BLD AUTO: 535 K/UL (ref 150–350)
PLATELET BLD QL SMEAR: ABNORMAL
PMV BLD AUTO: 9.5 FL (ref 9.2–12.9)
POLYCHROMASIA BLD QL SMEAR: ABNORMAL
POTASSIUM SERPL-SCNC: 4.2 MMOL/L (ref 3.5–5.1)
RBC # BLD AUTO: 3.5 M/UL (ref 4.6–6.2)
SODIUM SERPL-SCNC: 143 MMOL/L (ref 136–145)
TARGETS BLD QL SMEAR: ABNORMAL
URATE SERPL-MCNC: 5.7 MG/DL (ref 3.4–7)
WBC # BLD AUTO: 23.94 K/UL (ref 3.9–12.7)

## 2021-01-26 PROCEDURE — 85025 COMPLETE CBC W/AUTO DIFF WBC: CPT

## 2021-01-26 PROCEDURE — 94761 N-INVAS EAR/PLS OXIMETRY MLT: CPT

## 2021-01-26 PROCEDURE — 25500020 PHARM REV CODE 255: Performed by: INTERNAL MEDICINE

## 2021-01-26 PROCEDURE — 99233 PR SUBSEQUENT HOSPITAL CARE,LEVL III: ICD-10-PCS | Mod: ,,, | Performed by: INTERNAL MEDICINE

## 2021-01-26 PROCEDURE — 36415 COLL VENOUS BLD VENIPUNCTURE: CPT

## 2021-01-26 PROCEDURE — 25000003 PHARM REV CODE 250: Performed by: PHYSICIAN ASSISTANT

## 2021-01-26 PROCEDURE — 25000003 PHARM REV CODE 250: Performed by: UROLOGY

## 2021-01-26 PROCEDURE — 25000003 PHARM REV CODE 250: Performed by: INTERNAL MEDICINE

## 2021-01-26 PROCEDURE — 80048 BASIC METABOLIC PNL TOTAL CA: CPT

## 2021-01-26 PROCEDURE — 11000001 HC ACUTE MED/SURG PRIVATE ROOM

## 2021-01-26 PROCEDURE — 84550 ASSAY OF BLOOD/URIC ACID: CPT

## 2021-01-26 PROCEDURE — 63600175 PHARM REV CODE 636 W HCPCS: Performed by: INTERNAL MEDICINE

## 2021-01-26 PROCEDURE — 99233 SBSQ HOSP IP/OBS HIGH 50: CPT | Mod: ,,, | Performed by: INTERNAL MEDICINE

## 2021-01-26 RX ADMIN — DIATRIZOATE MEGLUMINE 50 ML: 300 INJECTION, SOLUTION INTRAVENOUS at 12:01

## 2021-01-26 RX ADMIN — PIPERACILLIN AND TAZOBACTAM 4.5 G: 4; .5 INJECTION, POWDER, LYOPHILIZED, FOR SOLUTION INTRAVENOUS; PARENTERAL at 01:01

## 2021-01-26 RX ADMIN — PIPERACILLIN AND TAZOBACTAM 4.5 G: 4; .5 INJECTION, POWDER, LYOPHILIZED, FOR SOLUTION INTRAVENOUS; PARENTERAL at 10:01

## 2021-01-26 RX ADMIN — SODIUM CHLORIDE: 0.9 INJECTION, SOLUTION INTRAVENOUS at 03:01

## 2021-01-26 RX ADMIN — PIPERACILLIN AND TAZOBACTAM 4.5 G: 4; .5 INJECTION, POWDER, LYOPHILIZED, FOR SOLUTION INTRAVENOUS; PARENTERAL at 04:01

## 2021-01-26 RX ADMIN — SODIUM CHLORIDE: 0.9 INJECTION, SOLUTION INTRAVENOUS at 04:01

## 2021-01-26 RX ADMIN — ACETAMINOPHEN 650 MG: 325 TABLET, FILM COATED ORAL at 03:01

## 2021-01-26 RX ADMIN — OXYCODONE HYDROCHLORIDE AND ACETAMINOPHEN 1 TABLET: 10; 325 TABLET ORAL at 06:01

## 2021-01-26 RX ADMIN — MUPIROCIN: 20 OINTMENT TOPICAL at 08:01

## 2021-01-27 LAB
ALBUMIN SERPL BCP-MCNC: 1.9 G/DL (ref 3.5–5.2)
ANION GAP SERPL CALC-SCNC: 14 MMOL/L (ref 8–16)
ANISOCYTOSIS BLD QL SMEAR: SLIGHT
BASOPHILS # BLD AUTO: 0.08 K/UL (ref 0–0.2)
BASOPHILS NFR BLD: 0.4 % (ref 0–1.9)
BUN SERPL-MCNC: 11 MG/DL (ref 8–23)
CALCIUM SERPL-MCNC: 8.3 MG/DL (ref 8.7–10.5)
CHLORIDE SERPL-SCNC: 106 MMOL/L (ref 95–110)
CO2 SERPL-SCNC: 20 MMOL/L (ref 23–29)
CREAT SERPL-MCNC: 0.9 MG/DL (ref 0.5–1.4)
DIFFERENTIAL METHOD: ABNORMAL
EOSINOPHIL # BLD AUTO: 0.4 K/UL (ref 0–0.5)
EOSINOPHIL NFR BLD: 1.8 % (ref 0–8)
ERYTHROCYTE [DISTWIDTH] IN BLOOD BY AUTOMATED COUNT: 17 % (ref 11.5–14.5)
EST. GFR  (AFRICAN AMERICAN): >60 ML/MIN/1.73 M^2
EST. GFR  (NON AFRICAN AMERICAN): >60 ML/MIN/1.73 M^2
GLUCOSE SERPL-MCNC: 73 MG/DL (ref 70–110)
HCT VFR BLD AUTO: 32.3 % (ref 40–54)
HGB BLD-MCNC: 10.7 G/DL (ref 14–18)
IMM GRANULOCYTES # BLD AUTO: 0.72 K/UL (ref 0–0.04)
IMM GRANULOCYTES NFR BLD AUTO: 3.3 % (ref 0–0.5)
LYMPHOCYTES # BLD AUTO: 2.4 K/UL (ref 1–4.8)
LYMPHOCYTES NFR BLD: 10.8 % (ref 18–48)
MCH RBC QN AUTO: 28.9 PG (ref 27–31)
MCHC RBC AUTO-ENTMCNC: 33.1 G/DL (ref 32–36)
MCV RBC AUTO: 87 FL (ref 82–98)
MONOCYTES # BLD AUTO: 2.2 K/UL (ref 0.3–1)
MONOCYTES NFR BLD: 9.9 % (ref 4–15)
NEUTROPHILS # BLD AUTO: 16.2 K/UL (ref 1.8–7.7)
NEUTROPHILS NFR BLD: 73.8 % (ref 38–73)
NRBC BLD-RTO: 0 /100 WBC
PHOSPHATE SERPL-MCNC: 2.6 MG/DL (ref 2.7–4.5)
PLATELET # BLD AUTO: 528 K/UL (ref 150–350)
PLATELET BLD QL SMEAR: ABNORMAL
PMV BLD AUTO: 9.4 FL (ref 9.2–12.9)
POLYCHROMASIA BLD QL SMEAR: ABNORMAL
POTASSIUM SERPL-SCNC: 3.5 MMOL/L (ref 3.5–5.1)
RBC # BLD AUTO: 3.7 M/UL (ref 4.6–6.2)
SODIUM SERPL-SCNC: 140 MMOL/L (ref 136–145)
WBC # BLD AUTO: 21.93 K/UL (ref 3.9–12.7)

## 2021-01-27 PROCEDURE — 87077 CULTURE AEROBIC IDENTIFY: CPT

## 2021-01-27 PROCEDURE — 25000003 PHARM REV CODE 250: Performed by: INTERNAL MEDICINE

## 2021-01-27 PROCEDURE — 11000001 HC ACUTE MED/SURG PRIVATE ROOM

## 2021-01-27 PROCEDURE — 25000003 PHARM REV CODE 250: Performed by: PHYSICIAN ASSISTANT

## 2021-01-27 PROCEDURE — 80069 RENAL FUNCTION PANEL: CPT

## 2021-01-27 PROCEDURE — 25000003 PHARM REV CODE 250: Performed by: UROLOGY

## 2021-01-27 PROCEDURE — 99233 PR SUBSEQUENT HOSPITAL CARE,LEVL III: ICD-10-PCS | Mod: ,,, | Performed by: INTERNAL MEDICINE

## 2021-01-27 PROCEDURE — 99233 SBSQ HOSP IP/OBS HIGH 50: CPT | Mod: ,,, | Performed by: INTERNAL MEDICINE

## 2021-01-27 PROCEDURE — 94761 N-INVAS EAR/PLS OXIMETRY MLT: CPT

## 2021-01-27 PROCEDURE — 99152 MOD SED SAME PHYS/QHP 5/>YRS: CPT | Performed by: RADIOLOGY

## 2021-01-27 PROCEDURE — 99232 PR SUBSEQUENT HOSPITAL CARE,LEVL II: ICD-10-PCS | Mod: ,,, | Performed by: UROLOGY

## 2021-01-27 PROCEDURE — 63600175 PHARM REV CODE 636 W HCPCS: Performed by: PHYSICIAN ASSISTANT

## 2021-01-27 PROCEDURE — 87075 CULTR BACTERIA EXCEPT BLOOD: CPT

## 2021-01-27 PROCEDURE — 87186 SC STD MICRODIL/AGAR DIL: CPT

## 2021-01-27 PROCEDURE — 99232 SBSQ HOSP IP/OBS MODERATE 35: CPT | Mod: ,,, | Performed by: UROLOGY

## 2021-01-27 PROCEDURE — 99153 MOD SED SAME PHYS/QHP EA: CPT | Performed by: RADIOLOGY

## 2021-01-27 PROCEDURE — 27000221 HC OXYGEN, UP TO 24 HOURS

## 2021-01-27 PROCEDURE — 36415 COLL VENOUS BLD VENIPUNCTURE: CPT

## 2021-01-27 PROCEDURE — 87205 SMEAR GRAM STAIN: CPT

## 2021-01-27 PROCEDURE — 85025 COMPLETE CBC W/AUTO DIFF WBC: CPT

## 2021-01-27 PROCEDURE — 63600175 PHARM REV CODE 636 W HCPCS: Performed by: RADIOLOGY

## 2021-01-27 PROCEDURE — 87070 CULTURE OTHR SPECIMN AEROBIC: CPT

## 2021-01-27 PROCEDURE — 63600175 PHARM REV CODE 636 W HCPCS: Performed by: INTERNAL MEDICINE

## 2021-01-27 RX ORDER — MIDAZOLAM HYDROCHLORIDE 1 MG/ML
INJECTION INTRAMUSCULAR; INTRAVENOUS
Status: DISCONTINUED | OUTPATIENT
Start: 2021-01-27 | End: 2021-01-27 | Stop reason: HOSPADM

## 2021-01-27 RX ORDER — L. ACIDOPHILUS/L.BULGARICUS 100MM CELL
1 GRANULES IN PACKET (EA) ORAL 2 TIMES DAILY
Status: DISCONTINUED | OUTPATIENT
Start: 2021-01-27 | End: 2021-02-01 | Stop reason: HOSPADM

## 2021-01-27 RX ORDER — FENTANYL CITRATE 50 UG/ML
INJECTION, SOLUTION INTRAMUSCULAR; INTRAVENOUS
Status: DISCONTINUED | OUTPATIENT
Start: 2021-01-27 | End: 2021-01-27 | Stop reason: HOSPADM

## 2021-01-27 RX ORDER — ATENOLOL 25 MG/1
100 TABLET ORAL DAILY
Status: DISCONTINUED | OUTPATIENT
Start: 2021-01-27 | End: 2021-02-01 | Stop reason: HOSPADM

## 2021-01-27 RX ADMIN — SODIUM CHLORIDE: 0.9 INJECTION, SOLUTION INTRAVENOUS at 10:01

## 2021-01-27 RX ADMIN — ACETAMINOPHEN 650 MG: 325 TABLET, FILM COATED ORAL at 09:01

## 2021-01-27 RX ADMIN — PIPERACILLIN AND TAZOBACTAM 4.5 G: 4; .5 INJECTION, POWDER, LYOPHILIZED, FOR SOLUTION INTRAVENOUS; PARENTERAL at 10:01

## 2021-01-27 RX ADMIN — PIPERACILLIN AND TAZOBACTAM 4.5 G: 4; .5 INJECTION, POWDER, LYOPHILIZED, FOR SOLUTION INTRAVENOUS; PARENTERAL at 01:01

## 2021-01-27 RX ADMIN — ONDANSETRON 4 MG: 2 INJECTION INTRAMUSCULAR; INTRAVENOUS at 07:01

## 2021-01-27 RX ADMIN — OXYCODONE HYDROCHLORIDE AND ACETAMINOPHEN 1 TABLET: 10; 325 TABLET ORAL at 01:01

## 2021-01-27 RX ADMIN — SODIUM CHLORIDE: 0.9 INJECTION, SOLUTION INTRAVENOUS at 03:01

## 2021-01-27 RX ADMIN — SODIUM CHLORIDE: 0.9 INJECTION, SOLUTION INTRAVENOUS at 01:01

## 2021-01-27 RX ADMIN — ATENOLOL 100 MG: 25 TABLET ORAL at 09:01

## 2021-01-27 RX ADMIN — ONDANSETRON 4 MG: 2 INJECTION INTRAMUSCULAR; INTRAVENOUS at 05:01

## 2021-01-27 RX ADMIN — LACTOBACILLUS ACIDOPHILUS / LACTOBACILLUS BULGARICUS 1 EACH: 100 MILLION CFU STRENGTH GRANULES at 09:01

## 2021-01-27 RX ADMIN — ACETAMINOPHEN 650 MG: 325 TABLET, FILM COATED ORAL at 10:01

## 2021-01-27 RX ADMIN — PIPERACILLIN AND TAZOBACTAM 4.5 G: 4; .5 INJECTION, POWDER, LYOPHILIZED, FOR SOLUTION INTRAVENOUS; PARENTERAL at 05:01

## 2021-01-28 LAB
ALBUMIN SERPL BCP-MCNC: 1.9 G/DL (ref 3.5–5.2)
ANION GAP SERPL CALC-SCNC: 11 MMOL/L (ref 8–16)
ANISOCYTOSIS BLD QL SMEAR: SLIGHT
BASOPHILS # BLD AUTO: 0.1 K/UL (ref 0–0.2)
BASOPHILS NFR BLD: 0.5 % (ref 0–1.9)
BUN SERPL-MCNC: 10 MG/DL (ref 8–23)
CALCIUM SERPL-MCNC: 8.2 MG/DL (ref 8.7–10.5)
CHLORIDE SERPL-SCNC: 106 MMOL/L (ref 95–110)
CO2 SERPL-SCNC: 22 MMOL/L (ref 23–29)
CREAT SERPL-MCNC: 0.8 MG/DL (ref 0.5–1.4)
DIFFERENTIAL METHOD: ABNORMAL
EOSINOPHIL # BLD AUTO: 0.3 K/UL (ref 0–0.5)
EOSINOPHIL NFR BLD: 1.2 % (ref 0–8)
ERYTHROCYTE [DISTWIDTH] IN BLOOD BY AUTOMATED COUNT: 17.2 % (ref 11.5–14.5)
EST. GFR  (AFRICAN AMERICAN): >60 ML/MIN/1.73 M^2
EST. GFR  (NON AFRICAN AMERICAN): >60 ML/MIN/1.73 M^2
GLUCOSE SERPL-MCNC: 56 MG/DL (ref 70–110)
HCT VFR BLD AUTO: 29.6 % (ref 40–54)
HGB BLD-MCNC: 9.8 G/DL (ref 14–18)
HYPOCHROMIA BLD QL SMEAR: ABNORMAL
IMM GRANULOCYTES # BLD AUTO: 0.43 K/UL (ref 0–0.04)
IMM GRANULOCYTES NFR BLD AUTO: 2 % (ref 0–0.5)
LYMPHOCYTES # BLD AUTO: 2.8 K/UL (ref 1–4.8)
LYMPHOCYTES NFR BLD: 13 % (ref 18–48)
MCH RBC QN AUTO: 29.1 PG (ref 27–31)
MCHC RBC AUTO-ENTMCNC: 33.1 G/DL (ref 32–36)
MCV RBC AUTO: 88 FL (ref 82–98)
MONOCYTES # BLD AUTO: 3 K/UL (ref 0.3–1)
MONOCYTES NFR BLD: 13.6 % (ref 4–15)
NEUTROPHILS # BLD AUTO: 15.1 K/UL (ref 1.8–7.7)
NEUTROPHILS NFR BLD: 69.7 % (ref 38–73)
NRBC BLD-RTO: 0 /100 WBC
PHOSPHATE SERPL-MCNC: 2.8 MG/DL (ref 2.7–4.5)
PLATELET # BLD AUTO: 512 K/UL (ref 150–350)
PLATELET BLD QL SMEAR: ABNORMAL
PMV BLD AUTO: 10 FL (ref 9.2–12.9)
POLYCHROMASIA BLD QL SMEAR: ABNORMAL
POTASSIUM SERPL-SCNC: 3.5 MMOL/L (ref 3.5–5.1)
RBC # BLD AUTO: 3.37 M/UL (ref 4.6–6.2)
SODIUM SERPL-SCNC: 139 MMOL/L (ref 136–145)
WBC # BLD AUTO: 21.63 K/UL (ref 3.9–12.7)

## 2021-01-28 PROCEDURE — 25000003 PHARM REV CODE 250: Performed by: PHYSICIAN ASSISTANT

## 2021-01-28 PROCEDURE — 36415 COLL VENOUS BLD VENIPUNCTURE: CPT

## 2021-01-28 PROCEDURE — 80069 RENAL FUNCTION PANEL: CPT

## 2021-01-28 PROCEDURE — 11000001 HC ACUTE MED/SURG PRIVATE ROOM

## 2021-01-28 PROCEDURE — 63600175 PHARM REV CODE 636 W HCPCS: Performed by: PHYSICIAN ASSISTANT

## 2021-01-28 PROCEDURE — 99233 PR SUBSEQUENT HOSPITAL CARE,LEVL III: ICD-10-PCS | Mod: ,,, | Performed by: INTERNAL MEDICINE

## 2021-01-28 PROCEDURE — 85025 COMPLETE CBC W/AUTO DIFF WBC: CPT

## 2021-01-28 PROCEDURE — 63600175 PHARM REV CODE 636 W HCPCS: Performed by: INTERNAL MEDICINE

## 2021-01-28 PROCEDURE — 94761 N-INVAS EAR/PLS OXIMETRY MLT: CPT

## 2021-01-28 PROCEDURE — 99233 SBSQ HOSP IP/OBS HIGH 50: CPT | Mod: ,,, | Performed by: INTERNAL MEDICINE

## 2021-01-28 PROCEDURE — 25000003 PHARM REV CODE 250: Performed by: INTERNAL MEDICINE

## 2021-01-28 PROCEDURE — 99232 SBSQ HOSP IP/OBS MODERATE 35: CPT | Mod: GC,,, | Performed by: UROLOGY

## 2021-01-28 PROCEDURE — 99232 PR SUBSEQUENT HOSPITAL CARE,LEVL II: ICD-10-PCS | Mod: GC,,, | Performed by: UROLOGY

## 2021-01-28 RX ADMIN — ONDANSETRON 4 MG: 2 INJECTION INTRAMUSCULAR; INTRAVENOUS at 03:01

## 2021-01-28 RX ADMIN — OXYCODONE HYDROCHLORIDE AND ACETAMINOPHEN 1 TABLET: 10; 325 TABLET ORAL at 03:01

## 2021-01-28 RX ADMIN — PIPERACILLIN AND TAZOBACTAM 4.5 G: 4; .5 INJECTION, POWDER, LYOPHILIZED, FOR SOLUTION INTRAVENOUS; PARENTERAL at 06:01

## 2021-01-28 RX ADMIN — OXYCODONE HYDROCHLORIDE AND ACETAMINOPHEN 1 TABLET: 10; 325 TABLET ORAL at 09:01

## 2021-01-28 RX ADMIN — ACETAMINOPHEN 650 MG: 325 TABLET, FILM COATED ORAL at 09:01

## 2021-01-28 RX ADMIN — VANCOMYCIN HYDROCHLORIDE 2000 MG: 10 INJECTION, POWDER, LYOPHILIZED, FOR SOLUTION INTRAVENOUS at 04:01

## 2021-01-28 RX ADMIN — ATENOLOL 100 MG: 25 TABLET ORAL at 09:01

## 2021-01-28 RX ADMIN — CEFTRIAXONE 1 G: 1 INJECTION, SOLUTION INTRAVENOUS at 02:01

## 2021-01-29 PROBLEM — N17.9 AKI (ACUTE KIDNEY INJURY): Status: RESOLVED | Noted: 2021-01-22 | Resolved: 2021-01-29

## 2021-01-29 LAB
ALBUMIN SERPL BCP-MCNC: 2.1 G/DL (ref 3.5–5.2)
ANION GAP SERPL CALC-SCNC: 12 MMOL/L (ref 8–16)
ANISOCYTOSIS BLD QL SMEAR: SLIGHT
BASOPHILS # BLD AUTO: 0.07 K/UL (ref 0–0.2)
BASOPHILS NFR BLD: 0.4 % (ref 0–1.9)
BUN SERPL-MCNC: 8 MG/DL (ref 8–23)
CALCIUM SERPL-MCNC: 8.3 MG/DL (ref 8.7–10.5)
CHLORIDE SERPL-SCNC: 105 MMOL/L (ref 95–110)
CO2 SERPL-SCNC: 23 MMOL/L (ref 23–29)
CREAT SERPL-MCNC: 0.8 MG/DL (ref 0.5–1.4)
DIFFERENTIAL METHOD: ABNORMAL
EOSINOPHIL # BLD AUTO: 0.2 K/UL (ref 0–0.5)
EOSINOPHIL NFR BLD: 1.4 % (ref 0–8)
ERYTHROCYTE [DISTWIDTH] IN BLOOD BY AUTOMATED COUNT: 17.4 % (ref 11.5–14.5)
EST. GFR  (AFRICAN AMERICAN): >60 ML/MIN/1.73 M^2
EST. GFR  (NON AFRICAN AMERICAN): >60 ML/MIN/1.73 M^2
GLUCOSE SERPL-MCNC: 65 MG/DL (ref 70–110)
HCT VFR BLD AUTO: 31.8 % (ref 40–54)
HGB BLD-MCNC: 10.4 G/DL (ref 14–18)
IMM GRANULOCYTES # BLD AUTO: 0.18 K/UL (ref 0–0.04)
IMM GRANULOCYTES NFR BLD AUTO: 1.1 % (ref 0–0.5)
LYMPHOCYTES # BLD AUTO: 2.7 K/UL (ref 1–4.8)
LYMPHOCYTES NFR BLD: 15.8 % (ref 18–48)
MCH RBC QN AUTO: 28.8 PG (ref 27–31)
MCHC RBC AUTO-ENTMCNC: 32.7 G/DL (ref 32–36)
MCV RBC AUTO: 88 FL (ref 82–98)
MONOCYTES # BLD AUTO: 2.9 K/UL (ref 0.3–1)
MONOCYTES NFR BLD: 16.8 % (ref 4–15)
NEUTROPHILS # BLD AUTO: 10.9 K/UL (ref 1.8–7.7)
NEUTROPHILS NFR BLD: 64.5 % (ref 38–73)
NRBC BLD-RTO: 0 /100 WBC
PHOSPHATE SERPL-MCNC: 2.4 MG/DL (ref 2.7–4.5)
PLATELET # BLD AUTO: 564 K/UL (ref 150–350)
PLATELET BLD QL SMEAR: ABNORMAL
PMV BLD AUTO: 10.1 FL (ref 9.2–12.9)
POTASSIUM SERPL-SCNC: 3.1 MMOL/L (ref 3.5–5.1)
RBC # BLD AUTO: 3.61 M/UL (ref 4.6–6.2)
SODIUM SERPL-SCNC: 140 MMOL/L (ref 136–145)
TARGETS BLD QL SMEAR: ABNORMAL
WBC # BLD AUTO: 16.95 K/UL (ref 3.9–12.7)

## 2021-01-29 PROCEDURE — 25000003 PHARM REV CODE 250: Performed by: INTERNAL MEDICINE

## 2021-01-29 PROCEDURE — 63600175 PHARM REV CODE 636 W HCPCS: Performed by: INTERNAL MEDICINE

## 2021-01-29 PROCEDURE — 85025 COMPLETE CBC W/AUTO DIFF WBC: CPT

## 2021-01-29 PROCEDURE — 99233 SBSQ HOSP IP/OBS HIGH 50: CPT | Mod: ,,, | Performed by: INTERNAL MEDICINE

## 2021-01-29 PROCEDURE — 94761 N-INVAS EAR/PLS OXIMETRY MLT: CPT

## 2021-01-29 PROCEDURE — 99233 PR SUBSEQUENT HOSPITAL CARE,LEVL III: ICD-10-PCS | Mod: ,,, | Performed by: INTERNAL MEDICINE

## 2021-01-29 PROCEDURE — 11000001 HC ACUTE MED/SURG PRIVATE ROOM

## 2021-01-29 PROCEDURE — 63600175 PHARM REV CODE 636 W HCPCS: Performed by: PHYSICIAN ASSISTANT

## 2021-01-29 PROCEDURE — 80069 RENAL FUNCTION PANEL: CPT

## 2021-01-29 PROCEDURE — 97802 MEDICAL NUTRITION INDIV IN: CPT

## 2021-01-29 PROCEDURE — 36415 COLL VENOUS BLD VENIPUNCTURE: CPT

## 2021-01-29 RX ORDER — POTASSIUM CHLORIDE 20 MEQ/1
40 TABLET, EXTENDED RELEASE ORAL ONCE
Status: DISCONTINUED | OUTPATIENT
Start: 2021-01-29 | End: 2021-01-29

## 2021-01-29 RX ORDER — AMLODIPINE BESYLATE 5 MG/1
5 TABLET ORAL DAILY
Status: DISCONTINUED | OUTPATIENT
Start: 2021-01-29 | End: 2021-02-01 | Stop reason: HOSPADM

## 2021-01-29 RX ORDER — ENOXAPARIN SODIUM 100 MG/ML
40 INJECTION SUBCUTANEOUS EVERY 24 HOURS
Status: DISCONTINUED | OUTPATIENT
Start: 2021-01-29 | End: 2021-02-01 | Stop reason: HOSPADM

## 2021-01-29 RX ADMIN — AMLODIPINE BESYLATE 5 MG: 5 TABLET ORAL at 09:01

## 2021-01-29 RX ADMIN — ATENOLOL 100 MG: 25 TABLET ORAL at 09:01

## 2021-01-29 RX ADMIN — ENOXAPARIN SODIUM 40 MG: 40 INJECTION SUBCUTANEOUS at 06:01

## 2021-01-29 RX ADMIN — ONDANSETRON 4 MG: 2 INJECTION INTRAMUSCULAR; INTRAVENOUS at 12:01

## 2021-01-29 RX ADMIN — VANCOMYCIN HYDROCHLORIDE 1750 MG: 10 INJECTION, POWDER, LYOPHILIZED, FOR SOLUTION INTRAVENOUS at 04:01

## 2021-01-29 RX ADMIN — CEFTRIAXONE 1 G: 1 INJECTION, SOLUTION INTRAVENOUS at 03:01

## 2021-01-29 RX ADMIN — POTASSIUM PHOSPHATE, MONOBASIC AND POTASSIUM PHOSPHATE, DIBASIC 20 MMOL: 224; 236 INJECTION, SOLUTION INTRAVENOUS at 09:01

## 2021-01-29 RX ADMIN — ONDANSETRON 4 MG: 2 INJECTION INTRAMUSCULAR; INTRAVENOUS at 09:01

## 2021-01-29 RX ADMIN — VANCOMYCIN HYDROCHLORIDE 1750 MG: 10 INJECTION, POWDER, LYOPHILIZED, FOR SOLUTION INTRAVENOUS at 06:01

## 2021-01-30 PROBLEM — R18.8 ABDOMINAL FLUID COLLECTION: Status: ACTIVE | Noted: 2021-01-21

## 2021-01-30 LAB
ALBUMIN SERPL BCP-MCNC: 2.3 G/DL (ref 3.5–5.2)
ANION GAP SERPL CALC-SCNC: 13 MMOL/L (ref 8–16)
ANISOCYTOSIS BLD QL SMEAR: SLIGHT
BACTERIA FLD AEROBE CULT: ABNORMAL
BASOPHILS # BLD AUTO: 0.05 K/UL (ref 0–0.2)
BASOPHILS NFR BLD: 0.3 % (ref 0–1.9)
BUN SERPL-MCNC: 7 MG/DL (ref 8–23)
CALCIUM SERPL-MCNC: 8.7 MG/DL (ref 8.7–10.5)
CHLORIDE SERPL-SCNC: 103 MMOL/L (ref 95–110)
CO2 SERPL-SCNC: 25 MMOL/L (ref 23–29)
CREAT SERPL-MCNC: 0.8 MG/DL (ref 0.5–1.4)
DIFFERENTIAL METHOD: ABNORMAL
EOSINOPHIL # BLD AUTO: 0.1 K/UL (ref 0–0.5)
EOSINOPHIL NFR BLD: 0.5 % (ref 0–8)
ERYTHROCYTE [DISTWIDTH] IN BLOOD BY AUTOMATED COUNT: 17.4 % (ref 11.5–14.5)
EST. GFR  (AFRICAN AMERICAN): >60 ML/MIN/1.73 M^2
EST. GFR  (NON AFRICAN AMERICAN): >60 ML/MIN/1.73 M^2
GLUCOSE SERPL-MCNC: 77 MG/DL (ref 70–110)
GRAM STN SPEC: ABNORMAL
GRAM STN SPEC: ABNORMAL
HCT VFR BLD AUTO: 33.3 % (ref 40–54)
HGB BLD-MCNC: 11.2 G/DL (ref 14–18)
HYPOCHROMIA BLD QL SMEAR: ABNORMAL
IMM GRANULOCYTES # BLD AUTO: 0.18 K/UL (ref 0–0.04)
IMM GRANULOCYTES NFR BLD AUTO: 0.9 % (ref 0–0.5)
LYMPHOCYTES # BLD AUTO: 2.5 K/UL (ref 1–4.8)
LYMPHOCYTES NFR BLD: 13.1 % (ref 18–48)
MCH RBC QN AUTO: 29.2 PG (ref 27–31)
MCHC RBC AUTO-ENTMCNC: 33.6 G/DL (ref 32–36)
MCV RBC AUTO: 87 FL (ref 82–98)
MONOCYTES # BLD AUTO: 3.3 K/UL (ref 0.3–1)
MONOCYTES NFR BLD: 17.4 % (ref 4–15)
NEUTROPHILS # BLD AUTO: 12.8 K/UL (ref 1.8–7.7)
NEUTROPHILS NFR BLD: 67.8 % (ref 38–73)
NRBC BLD-RTO: 0 /100 WBC
PHOSPHATE SERPL-MCNC: 2.5 MG/DL (ref 2.7–4.5)
PLATELET # BLD AUTO: 621 K/UL (ref 150–350)
PLATELET BLD QL SMEAR: ABNORMAL
PMV BLD AUTO: 10.6 FL (ref 9.2–12.9)
POLYCHROMASIA BLD QL SMEAR: ABNORMAL
POTASSIUM SERPL-SCNC: 3.3 MMOL/L (ref 3.5–5.1)
RBC # BLD AUTO: 3.84 M/UL (ref 4.6–6.2)
SODIUM SERPL-SCNC: 141 MMOL/L (ref 136–145)
TARGETS BLD QL SMEAR: ABNORMAL
VANCOMYCIN TROUGH SERPL-MCNC: 35.4 UG/ML (ref 10–22)
WBC # BLD AUTO: 18.95 K/UL (ref 3.9–12.7)

## 2021-01-30 PROCEDURE — 99233 PR SUBSEQUENT HOSPITAL CARE,LEVL III: ICD-10-PCS | Mod: ,,, | Performed by: UROLOGY

## 2021-01-30 PROCEDURE — 11000001 HC ACUTE MED/SURG PRIVATE ROOM

## 2021-01-30 PROCEDURE — 99233 SBSQ HOSP IP/OBS HIGH 50: CPT | Mod: ,,, | Performed by: INTERNAL MEDICINE

## 2021-01-30 PROCEDURE — 63600175 PHARM REV CODE 636 W HCPCS: Performed by: INTERNAL MEDICINE

## 2021-01-30 PROCEDURE — 25000003 PHARM REV CODE 250: Performed by: PHYSICIAN ASSISTANT

## 2021-01-30 PROCEDURE — 80202 ASSAY OF VANCOMYCIN: CPT

## 2021-01-30 PROCEDURE — 63600175 PHARM REV CODE 636 W HCPCS: Performed by: PHYSICIAN ASSISTANT

## 2021-01-30 PROCEDURE — 25000003 PHARM REV CODE 250: Performed by: NURSE PRACTITIONER

## 2021-01-30 PROCEDURE — 99900035 HC TECH TIME PER 15 MIN (STAT)

## 2021-01-30 PROCEDURE — 25000003 PHARM REV CODE 250: Performed by: INTERNAL MEDICINE

## 2021-01-30 PROCEDURE — 80069 RENAL FUNCTION PANEL: CPT

## 2021-01-30 PROCEDURE — 99233 SBSQ HOSP IP/OBS HIGH 50: CPT | Mod: ,,, | Performed by: UROLOGY

## 2021-01-30 PROCEDURE — 36415 COLL VENOUS BLD VENIPUNCTURE: CPT

## 2021-01-30 PROCEDURE — 94761 N-INVAS EAR/PLS OXIMETRY MLT: CPT

## 2021-01-30 PROCEDURE — 85025 COMPLETE CBC W/AUTO DIFF WBC: CPT

## 2021-01-30 PROCEDURE — 99233 PR SUBSEQUENT HOSPITAL CARE,LEVL III: ICD-10-PCS | Mod: ,,, | Performed by: INTERNAL MEDICINE

## 2021-01-30 RX ORDER — LORAZEPAM 1 MG/1
2 TABLET ORAL EVERY 8 HOURS PRN
Status: DISCONTINUED | OUTPATIENT
Start: 2021-01-30 | End: 2021-02-01 | Stop reason: HOSPADM

## 2021-01-30 RX ORDER — CEFEPIME HYDROCHLORIDE 1 G/50ML
2 INJECTION, SOLUTION INTRAVENOUS
Status: DISCONTINUED | OUTPATIENT
Start: 2021-01-30 | End: 2021-01-30

## 2021-01-30 RX ADMIN — ENOXAPARIN SODIUM 40 MG: 40 INJECTION SUBCUTANEOUS at 06:01

## 2021-01-30 RX ADMIN — CEFTRIAXONE 2 G: 2 INJECTION, SOLUTION INTRAVENOUS at 01:01

## 2021-01-30 RX ADMIN — FAMOTIDINE 20 MG: 20 TABLET ORAL at 09:01

## 2021-01-30 RX ADMIN — ATENOLOL 100 MG: 25 TABLET ORAL at 09:01

## 2021-01-30 RX ADMIN — VANCOMYCIN HYDROCHLORIDE 1750 MG: 10 INJECTION, POWDER, LYOPHILIZED, FOR SOLUTION INTRAVENOUS at 05:01

## 2021-01-30 RX ADMIN — LORAZEPAM 2 MG: 1 TABLET ORAL at 11:01

## 2021-01-30 RX ADMIN — CEFEPIME HYDROCHLORIDE 2 G: 2 INJECTION, SOLUTION INTRAVENOUS at 09:01

## 2021-01-30 RX ADMIN — ACETAMINOPHEN 650 MG: 325 TABLET, FILM COATED ORAL at 05:01

## 2021-01-30 RX ADMIN — POTASSIUM PHOSPHATE, MONOBASIC AND POTASSIUM PHOSPHATE, DIBASIC 20 MMOL: 224; 236 INJECTION, SOLUTION, CONCENTRATE INTRAVENOUS at 10:01

## 2021-01-30 RX ADMIN — OXYCODONE HYDROCHLORIDE AND ACETAMINOPHEN 1 TABLET: 10; 325 TABLET ORAL at 04:01

## 2021-01-30 RX ADMIN — FAMOTIDINE 20 MG: 20 TABLET ORAL at 10:01

## 2021-01-30 RX ADMIN — SIMETHICONE 80 MG: 80 TABLET, CHEWABLE ORAL at 09:01

## 2021-01-30 RX ADMIN — ONDANSETRON 4 MG: 2 INJECTION INTRAMUSCULAR; INTRAVENOUS at 02:01

## 2021-01-30 RX ADMIN — AMLODIPINE BESYLATE 5 MG: 5 TABLET ORAL at 09:01

## 2021-01-31 LAB
ALBUMIN SERPL BCP-MCNC: 2.2 G/DL (ref 3.5–5.2)
ANION GAP SERPL CALC-SCNC: 14 MMOL/L (ref 8–16)
ANISOCYTOSIS BLD QL SMEAR: SLIGHT
BASOPHILS # BLD AUTO: 0.11 K/UL (ref 0–0.2)
BASOPHILS NFR BLD: 0.6 % (ref 0–1.9)
BUN SERPL-MCNC: 8 MG/DL (ref 8–23)
CALCIUM SERPL-MCNC: 8.2 MG/DL (ref 8.7–10.5)
CHLORIDE SERPL-SCNC: 103 MMOL/L (ref 95–110)
CO2 SERPL-SCNC: 23 MMOL/L (ref 23–29)
CREAT SERPL-MCNC: 0.8 MG/DL (ref 0.5–1.4)
DIFFERENTIAL METHOD: ABNORMAL
EOSINOPHIL # BLD AUTO: 0.1 K/UL (ref 0–0.5)
EOSINOPHIL NFR BLD: 0.7 % (ref 0–8)
ERYTHROCYTE [DISTWIDTH] IN BLOOD BY AUTOMATED COUNT: 17.5 % (ref 11.5–14.5)
EST. GFR  (AFRICAN AMERICAN): >60 ML/MIN/1.73 M^2
EST. GFR  (NON AFRICAN AMERICAN): >60 ML/MIN/1.73 M^2
GLUCOSE SERPL-MCNC: 63 MG/DL (ref 70–110)
HCT VFR BLD AUTO: 31.7 % (ref 40–54)
HGB BLD-MCNC: 10.6 G/DL (ref 14–18)
HYPOCHROMIA BLD QL SMEAR: ABNORMAL
IMM GRANULOCYTES # BLD AUTO: 0.1 K/UL (ref 0–0.04)
IMM GRANULOCYTES NFR BLD AUTO: 0.6 % (ref 0–0.5)
LYMPHOCYTES # BLD AUTO: 2.7 K/UL (ref 1–4.8)
LYMPHOCYTES NFR BLD: 15.3 % (ref 18–48)
MCH RBC QN AUTO: 29.3 PG (ref 27–31)
MCHC RBC AUTO-ENTMCNC: 33.4 G/DL (ref 32–36)
MCV RBC AUTO: 88 FL (ref 82–98)
MONOCYTES # BLD AUTO: 2.7 K/UL (ref 0.3–1)
MONOCYTES NFR BLD: 15.5 % (ref 4–15)
NEUTROPHILS # BLD AUTO: 11.8 K/UL (ref 1.8–7.7)
NEUTROPHILS NFR BLD: 67.3 % (ref 38–73)
NRBC BLD-RTO: 0 /100 WBC
PHOSPHATE SERPL-MCNC: 2.1 MG/DL (ref 2.7–4.5)
PLATELET # BLD AUTO: 642 K/UL (ref 150–350)
PLATELET BLD QL SMEAR: ABNORMAL
PMV BLD AUTO: 10.2 FL (ref 9.2–12.9)
POIKILOCYTOSIS BLD QL SMEAR: SLIGHT
POLYCHROMASIA BLD QL SMEAR: ABNORMAL
POTASSIUM SERPL-SCNC: 3 MMOL/L (ref 3.5–5.1)
RBC # BLD AUTO: 3.62 M/UL (ref 4.6–6.2)
SODIUM SERPL-SCNC: 140 MMOL/L (ref 136–145)
TARGETS BLD QL SMEAR: ABNORMAL
WBC # BLD AUTO: 17.59 K/UL (ref 3.9–12.7)

## 2021-01-31 PROCEDURE — 94761 N-INVAS EAR/PLS OXIMETRY MLT: CPT

## 2021-01-31 PROCEDURE — 36415 COLL VENOUS BLD VENIPUNCTURE: CPT

## 2021-01-31 PROCEDURE — 99233 SBSQ HOSP IP/OBS HIGH 50: CPT | Mod: ,,, | Performed by: INTERNAL MEDICINE

## 2021-01-31 PROCEDURE — 76937 US GUIDE VASCULAR ACCESS: CPT

## 2021-01-31 PROCEDURE — 25000003 PHARM REV CODE 250: Performed by: INTERNAL MEDICINE

## 2021-01-31 PROCEDURE — 63600175 PHARM REV CODE 636 W HCPCS: Performed by: INTERNAL MEDICINE

## 2021-01-31 PROCEDURE — 25000003 PHARM REV CODE 250: Performed by: NURSE PRACTITIONER

## 2021-01-31 PROCEDURE — 11000001 HC ACUTE MED/SURG PRIVATE ROOM

## 2021-01-31 PROCEDURE — C1751 CATH, INF, PER/CENT/MIDLINE: HCPCS

## 2021-01-31 PROCEDURE — 80069 RENAL FUNCTION PANEL: CPT

## 2021-01-31 PROCEDURE — 25000003 PHARM REV CODE 250: Performed by: UROLOGY

## 2021-01-31 PROCEDURE — 85025 COMPLETE CBC W/AUTO DIFF WBC: CPT

## 2021-01-31 PROCEDURE — 36410 VNPNXR 3YR/> PHY/QHP DX/THER: CPT

## 2021-01-31 PROCEDURE — 99233 PR SUBSEQUENT HOSPITAL CARE,LEVL III: ICD-10-PCS | Mod: ,,, | Performed by: INTERNAL MEDICINE

## 2021-01-31 RX ADMIN — LORAZEPAM 2 MG: 1 TABLET ORAL at 10:01

## 2021-01-31 RX ADMIN — CEFTRIAXONE 2 G: 2 INJECTION, SOLUTION INTRAVENOUS at 01:01

## 2021-01-31 RX ADMIN — Medication 6 MG: at 10:01

## 2021-01-31 RX ADMIN — FAMOTIDINE 20 MG: 20 TABLET ORAL at 10:01

## 2021-01-31 RX ADMIN — ENOXAPARIN SODIUM 40 MG: 40 INJECTION SUBCUTANEOUS at 04:01

## 2021-01-31 RX ADMIN — POTASSIUM PHOSPHATE, MONOBASIC AND POTASSIUM PHOSPHATE, DIBASIC 30 MMOL: 224; 236 INJECTION, SOLUTION, CONCENTRATE INTRAVENOUS at 02:01

## 2021-01-31 RX ADMIN — LACTOBACILLUS ACIDOPHILUS / LACTOBACILLUS BULGARICUS 1 EACH: 100 MILLION CFU STRENGTH GRANULES at 08:01

## 2021-02-01 ENCOUNTER — TELEPHONE (OUTPATIENT)
Dept: UROLOGY | Facility: CLINIC | Age: 66
End: 2021-02-01

## 2021-02-01 VITALS
RESPIRATION RATE: 20 BRPM | OXYGEN SATURATION: 96 % | DIASTOLIC BLOOD PRESSURE: 68 MMHG | HEIGHT: 73 IN | WEIGHT: 200.38 LBS | BODY MASS INDEX: 26.56 KG/M2 | SYSTOLIC BLOOD PRESSURE: 136 MMHG | TEMPERATURE: 99 F | HEART RATE: 72 BPM

## 2021-02-01 LAB
ALBUMIN SERPL BCP-MCNC: 2.1 G/DL (ref 3.5–5.2)
ANION GAP SERPL CALC-SCNC: 13 MMOL/L (ref 8–16)
ANISOCYTOSIS BLD QL SMEAR: SLIGHT
BACTERIA SPEC ANAEROBE CULT: NORMAL
BASOPHILS # BLD AUTO: 0.13 K/UL (ref 0–0.2)
BASOPHILS NFR BLD: 0.9 % (ref 0–1.9)
BUN SERPL-MCNC: 7 MG/DL (ref 8–23)
CALCIUM SERPL-MCNC: 8.3 MG/DL (ref 8.7–10.5)
CHLORIDE SERPL-SCNC: 104 MMOL/L (ref 95–110)
CO2 SERPL-SCNC: 25 MMOL/L (ref 23–29)
CREAT SERPL-MCNC: 0.8 MG/DL (ref 0.5–1.4)
DIFFERENTIAL METHOD: ABNORMAL
EOSINOPHIL # BLD AUTO: 0.2 K/UL (ref 0–0.5)
EOSINOPHIL NFR BLD: 1.2 % (ref 0–8)
ERYTHROCYTE [DISTWIDTH] IN BLOOD BY AUTOMATED COUNT: 17.5 % (ref 11.5–14.5)
EST. GFR  (AFRICAN AMERICAN): >60 ML/MIN/1.73 M^2
EST. GFR  (NON AFRICAN AMERICAN): >60 ML/MIN/1.73 M^2
GLUCOSE SERPL-MCNC: 59 MG/DL (ref 70–110)
HCT VFR BLD AUTO: 29 % (ref 40–54)
HGB BLD-MCNC: 9.5 G/DL (ref 14–18)
HYPOCHROMIA BLD QL SMEAR: ABNORMAL
IMM GRANULOCYTES # BLD AUTO: 0.12 K/UL (ref 0–0.04)
IMM GRANULOCYTES NFR BLD AUTO: 0.8 % (ref 0–0.5)
LYMPHOCYTES # BLD AUTO: 2.6 K/UL (ref 1–4.8)
LYMPHOCYTES NFR BLD: 17 % (ref 18–48)
MCH RBC QN AUTO: 29.1 PG (ref 27–31)
MCHC RBC AUTO-ENTMCNC: 32.8 G/DL (ref 32–36)
MCV RBC AUTO: 89 FL (ref 82–98)
MONOCYTES # BLD AUTO: 2.7 K/UL (ref 0.3–1)
MONOCYTES NFR BLD: 17.4 % (ref 4–15)
NEUTROPHILS # BLD AUTO: 9.6 K/UL (ref 1.8–7.7)
NEUTROPHILS NFR BLD: 62.7 % (ref 38–73)
NRBC BLD-RTO: 0 /100 WBC
PHOSPHATE SERPL-MCNC: 2.8 MG/DL (ref 2.7–4.5)
PLATELET # BLD AUTO: 605 K/UL (ref 150–350)
PLATELET BLD QL SMEAR: ABNORMAL
PMV BLD AUTO: 11.1 FL (ref 9.2–12.9)
POLYCHROMASIA BLD QL SMEAR: ABNORMAL
POTASSIUM SERPL-SCNC: 3.2 MMOL/L (ref 3.5–5.1)
RBC # BLD AUTO: 3.27 M/UL (ref 4.6–6.2)
SODIUM SERPL-SCNC: 142 MMOL/L (ref 136–145)
TARGETS BLD QL SMEAR: ABNORMAL
WBC # BLD AUTO: 15.25 K/UL (ref 3.9–12.7)

## 2021-02-01 PROCEDURE — 85025 COMPLETE CBC W/AUTO DIFF WBC: CPT

## 2021-02-01 PROCEDURE — 99232 PR SUBSEQUENT HOSPITAL CARE,LEVL II: ICD-10-PCS | Mod: GC,,, | Performed by: UROLOGY

## 2021-02-01 PROCEDURE — 25000003 PHARM REV CODE 250: Performed by: NURSE PRACTITIONER

## 2021-02-01 PROCEDURE — 99232 SBSQ HOSP IP/OBS MODERATE 35: CPT | Mod: GC,,, | Performed by: UROLOGY

## 2021-02-01 PROCEDURE — 25000003 PHARM REV CODE 250: Performed by: INTERNAL MEDICINE

## 2021-02-01 PROCEDURE — 94761 N-INVAS EAR/PLS OXIMETRY MLT: CPT

## 2021-02-01 PROCEDURE — 63600175 PHARM REV CODE 636 W HCPCS: Performed by: INTERNAL MEDICINE

## 2021-02-01 PROCEDURE — 80069 RENAL FUNCTION PANEL: CPT

## 2021-02-01 PROCEDURE — 99239 PR HOSPITAL DISCHARGE DAY,>30 MIN: ICD-10-PCS | Mod: ,,, | Performed by: INTERNAL MEDICINE

## 2021-02-01 PROCEDURE — 25500020 PHARM REV CODE 255: Performed by: INTERNAL MEDICINE

## 2021-02-01 PROCEDURE — 25000003 PHARM REV CODE 250: Performed by: PHYSICIAN ASSISTANT

## 2021-02-01 PROCEDURE — 99239 HOSP IP/OBS DSCHRG MGMT >30: CPT | Mod: ,,, | Performed by: INTERNAL MEDICINE

## 2021-02-01 RX ORDER — AMOXICILLIN AND CLAVULANATE POTASSIUM 875; 125 MG/1; MG/1
1 TABLET, FILM COATED ORAL 2 TIMES DAILY
Qty: 20 TABLET | Refills: 0 | Status: SHIPPED | OUTPATIENT
Start: 2021-02-01 | End: 2021-02-11

## 2021-02-01 RX ORDER — POTASSIUM CHLORIDE 20 MEQ/1
20 TABLET, EXTENDED RELEASE ORAL ONCE
Status: COMPLETED | OUTPATIENT
Start: 2021-02-01 | End: 2021-02-01

## 2021-02-01 RX ADMIN — FAMOTIDINE 20 MG: 20 TABLET ORAL at 10:02

## 2021-02-01 RX ADMIN — OXYCODONE HYDROCHLORIDE AND ACETAMINOPHEN 1 TABLET: 10; 325 TABLET ORAL at 10:02

## 2021-02-01 RX ADMIN — AMLODIPINE BESYLATE 5 MG: 5 TABLET ORAL at 12:02

## 2021-02-01 RX ADMIN — POTASSIUM CHLORIDE 20 MEQ: 1500 TABLET, EXTENDED RELEASE ORAL at 12:02

## 2021-02-01 RX ADMIN — LACTOBACILLUS ACIDOPHILUS / LACTOBACILLUS BULGARICUS 1 EACH: 100 MILLION CFU STRENGTH GRANULES at 12:02

## 2021-02-01 RX ADMIN — CEFTRIAXONE 2 G: 2 INJECTION, SOLUTION INTRAVENOUS at 12:02

## 2021-02-01 RX ADMIN — ATENOLOL 100 MG: 25 TABLET ORAL at 12:02

## 2021-02-01 RX ADMIN — DIATRIZOATE MEGLUMINE 50 ML: 300 INJECTION, SOLUTION INTRAVENOUS at 08:02

## 2021-02-02 ENCOUNTER — PATIENT OUTREACH (OUTPATIENT)
Dept: ADMINISTRATIVE | Facility: CLINIC | Age: 66
End: 2021-02-02

## 2021-02-08 ENCOUNTER — TELEPHONE (OUTPATIENT)
Dept: UROLOGY | Facility: CLINIC | Age: 66
End: 2021-02-08

## 2021-02-10 ENCOUNTER — OFFICE VISIT (OUTPATIENT)
Dept: WOUND CARE | Facility: CLINIC | Age: 66
End: 2021-02-10
Payer: MEDICARE

## 2021-02-10 VITALS
DIASTOLIC BLOOD PRESSURE: 62 MMHG | TEMPERATURE: 97 F | SYSTOLIC BLOOD PRESSURE: 118 MMHG | HEART RATE: 90 BPM | RESPIRATION RATE: 18 BRPM

## 2021-02-10 DIAGNOSIS — L89.312 DECUBITUS ULCER OF RIGHT BUTTOCK, STAGE 2: ICD-10-CM

## 2021-02-10 DIAGNOSIS — L89.212 PRESSURE INJURY OF RIGHT THIGH, STAGE 2: Primary | ICD-10-CM

## 2021-02-10 DIAGNOSIS — G82.20 PARAPLEGIA: ICD-10-CM

## 2021-02-10 DIAGNOSIS — L89.152 PRESSURE INJURY OF SACRAL REGION, STAGE 2: ICD-10-CM

## 2021-02-10 PROCEDURE — 99213 OFFICE O/P EST LOW 20 MIN: CPT | Mod: 95,,, | Performed by: NURSE PRACTITIONER

## 2021-02-10 PROCEDURE — 99213 PR OFFICE/OUTPT VISIT, EST, LEVL III, 20-29 MIN: ICD-10-PCS | Mod: 95,,, | Performed by: NURSE PRACTITIONER

## 2021-02-25 ENCOUNTER — OFFICE VISIT (OUTPATIENT)
Dept: UROLOGY | Facility: CLINIC | Age: 66
End: 2021-02-25
Payer: MEDICARE

## 2021-02-25 VITALS
SYSTOLIC BLOOD PRESSURE: 115 MMHG | HEIGHT: 73 IN | HEART RATE: 97 BPM | DIASTOLIC BLOOD PRESSURE: 68 MMHG | BODY MASS INDEX: 26.56 KG/M2 | WEIGHT: 200.38 LBS

## 2021-02-25 DIAGNOSIS — N32.2 VESICOCUTANEOUS FISTULA: ICD-10-CM

## 2021-02-25 DIAGNOSIS — N13.30 HYDRONEPHROSIS OF RIGHT KIDNEY: ICD-10-CM

## 2021-02-25 DIAGNOSIS — N30.00 ACUTE CYSTITIS WITHOUT HEMATURIA: Primary | ICD-10-CM

## 2021-02-25 DIAGNOSIS — N31.9 NEUROGENIC BLADDER: ICD-10-CM

## 2021-02-25 PROCEDURE — 99214 OFFICE O/P EST MOD 30 MIN: CPT | Mod: S$GLB,,, | Performed by: NURSE PRACTITIONER

## 2021-02-25 PROCEDURE — 87186 SC STD MICRODIL/AGAR DIL: CPT

## 2021-02-25 PROCEDURE — 87088 URINE BACTERIA CULTURE: CPT

## 2021-02-25 PROCEDURE — 99214 PR OFFICE/OUTPT VISIT, EST, LEVL IV, 30-39 MIN: ICD-10-PCS | Mod: S$GLB,,, | Performed by: NURSE PRACTITIONER

## 2021-02-25 PROCEDURE — 87086 URINE CULTURE/COLONY COUNT: CPT

## 2021-02-25 PROCEDURE — 87077 CULTURE AEROBIC IDENTIFY: CPT

## 2021-02-25 RX ORDER — DOXYCYCLINE 100 MG/1
100 CAPSULE ORAL EVERY 12 HOURS
Qty: 14 CAPSULE | Refills: 0 | Status: SHIPPED | OUTPATIENT
Start: 2021-02-25 | End: 2021-03-04

## 2021-03-01 DIAGNOSIS — N30.00 ACUTE CYSTITIS WITHOUT HEMATURIA: Primary | ICD-10-CM

## 2021-03-01 LAB — BACTERIA UR CULT: ABNORMAL

## 2021-03-01 RX ORDER — CIPROFLOXACIN 500 MG/1
500 TABLET ORAL 2 TIMES DAILY
Qty: 14 TABLET | Refills: 0 | Status: SHIPPED | OUTPATIENT
Start: 2021-03-01 | End: 2021-03-08

## 2021-03-05 DIAGNOSIS — S37.20XS INJURY OF BLADDER, SEQUELA: Primary | ICD-10-CM

## 2021-03-12 ENCOUNTER — HOSPITAL ENCOUNTER (OUTPATIENT)
Facility: OTHER | Age: 66
Discharge: HOME | End: 2021-03-12
Attending: RADIOLOGY | Admitting: RADIOLOGY
Payer: MEDICARE

## 2021-03-12 ENCOUNTER — HOSPITAL ENCOUNTER (OUTPATIENT)
Dept: RADIOLOGY | Facility: OTHER | Age: 66
Discharge: HOME | End: 2021-03-12
Attending: NURSE PRACTITIONER | Admitting: RADIOLOGY
Payer: MEDICARE

## 2021-03-12 VITALS
SYSTOLIC BLOOD PRESSURE: 137 MMHG | HEIGHT: 73 IN | OXYGEN SATURATION: 99 % | TEMPERATURE: 97 F | RESPIRATION RATE: 18 BRPM | DIASTOLIC BLOOD PRESSURE: 81 MMHG | WEIGHT: 200 LBS | HEART RATE: 75 BPM | BODY MASS INDEX: 26.51 KG/M2

## 2021-03-12 DIAGNOSIS — N32.2 VESICOCUTANEOUS FISTULA: ICD-10-CM

## 2021-03-12 DIAGNOSIS — S37.20XS INJURY OF BLADDER, SEQUELA: ICD-10-CM

## 2021-03-12 DIAGNOSIS — K63.2 ABDOMINAL WALL FISTULA: ICD-10-CM

## 2021-03-12 PROCEDURE — 25500020 PHARM REV CODE 255: Performed by: NURSE PRACTITIONER

## 2021-03-12 PROCEDURE — 72194 CT CYSTOGRAPHY W/O CONTRAST: ICD-10-PCS | Mod: 26,,, | Performed by: RADIOLOGY

## 2021-03-12 PROCEDURE — 25000003 PHARM REV CODE 250: Performed by: RADIOLOGY

## 2021-03-12 PROCEDURE — 72194 CT PELVIS W/O & W/DYE: CPT | Mod: TC

## 2021-03-12 PROCEDURE — 72194 CT PELVIS W/O & W/DYE: CPT | Mod: 26,,, | Performed by: RADIOLOGY

## 2021-03-12 RX ORDER — LORAZEPAM 0.5 MG/1
0.5 TABLET ORAL ONCE
Status: COMPLETED | OUTPATIENT
Start: 2021-03-12 | End: 2021-03-12

## 2021-03-12 RX ADMIN — DIATRIZOATE MEGLUMINE 50 ML: 300 INJECTION, SOLUTION INTRAVENOUS at 02:03

## 2021-03-12 RX ADMIN — LORAZEPAM 0.5 MG: 0.5 TABLET ORAL at 11:03

## 2021-04-06 ENCOUNTER — TELEPHONE (OUTPATIENT)
Dept: WOUND CARE | Facility: CLINIC | Age: 66
End: 2021-04-06

## 2021-04-07 ENCOUNTER — TELEPHONE (OUTPATIENT)
Dept: WOUND CARE | Facility: CLINIC | Age: 66
End: 2021-04-07

## 2021-04-14 ENCOUNTER — TELEPHONE (OUTPATIENT)
Dept: WOUND CARE | Facility: CLINIC | Age: 66
End: 2021-04-14

## 2021-04-14 DIAGNOSIS — L89.310 DECUBITUS ULCER OF RIGHT BUTTOCK, UNSTAGEABLE: Primary | ICD-10-CM

## 2021-04-21 ENCOUNTER — OFFICE VISIT (OUTPATIENT)
Dept: SURGERY | Facility: CLINIC | Age: 66
End: 2021-04-21
Payer: MEDICARE

## 2021-04-21 VITALS
TEMPERATURE: 98 F | HEART RATE: 78 BPM | WEIGHT: 199.94 LBS | SYSTOLIC BLOOD PRESSURE: 136 MMHG | BODY MASS INDEX: 26.5 KG/M2 | DIASTOLIC BLOOD PRESSURE: 67 MMHG | HEIGHT: 73 IN

## 2021-04-21 DIAGNOSIS — L89.310 DECUBITUS ULCER OF RIGHT BUTTOCK, UNSTAGEABLE: ICD-10-CM

## 2021-04-21 PROCEDURE — 99999 PR PBB SHADOW E&M-EST. PATIENT-LVL V: ICD-10-PCS | Mod: PBBFAC,,, | Performed by: SURGERY

## 2021-04-21 PROCEDURE — 99999 PR PBB SHADOW E&M-EST. PATIENT-LVL V: CPT | Mod: PBBFAC,,, | Performed by: SURGERY

## 2021-04-21 PROCEDURE — 99215 OFFICE O/P EST HI 40 MIN: CPT | Mod: PBBFAC | Performed by: SURGERY

## 2021-04-21 PROCEDURE — 99203 OFFICE O/P NEW LOW 30 MIN: CPT | Mod: S$PBB,,, | Performed by: SURGERY

## 2021-04-21 PROCEDURE — 99203 PR OFFICE/OUTPT VISIT, NEW, LEVL III, 30-44 MIN: ICD-10-PCS | Mod: S$PBB,,, | Performed by: SURGERY

## 2021-04-28 ENCOUNTER — PATIENT MESSAGE (OUTPATIENT)
Dept: SURGERY | Facility: CLINIC | Age: 66
End: 2021-04-28

## 2021-04-28 ENCOUNTER — TELEPHONE (OUTPATIENT)
Dept: SURGERY | Facility: CLINIC | Age: 66
End: 2021-04-28

## 2021-04-28 ENCOUNTER — PATIENT MESSAGE (OUTPATIENT)
Dept: RESEARCH | Facility: HOSPITAL | Age: 66
End: 2021-04-28

## 2021-04-29 ENCOUNTER — NURSE TRIAGE (OUTPATIENT)
Dept: ADMINISTRATIVE | Facility: CLINIC | Age: 66
End: 2021-04-29

## 2021-04-29 ENCOUNTER — TELEPHONE (OUTPATIENT)
Dept: SURGERY | Facility: CLINIC | Age: 66
End: 2021-04-29

## 2021-04-29 ENCOUNTER — HOSPITAL ENCOUNTER (OUTPATIENT)
Facility: HOSPITAL | Age: 66
Discharge: HOME | End: 2021-04-29
Attending: SURGERY | Admitting: SURGERY
Payer: MEDICARE

## 2021-04-29 DIAGNOSIS — L89.159 SACRAL DECUBITUS ULCER: ICD-10-CM

## 2021-04-29 DIAGNOSIS — Z01.818 PRE-OP TESTING: ICD-10-CM

## 2021-04-29 DIAGNOSIS — L89.310 DECUBITUS ULCER OF RIGHT BUTTOCK, UNSTAGEABLE: Primary | ICD-10-CM

## 2021-04-29 LAB — SARS-COV-2 RDRP RESP QL NAA+PROBE: NEGATIVE

## 2021-04-29 PROCEDURE — 99499 NO LOS: ICD-10-PCS | Mod: ,,, | Performed by: SURGERY

## 2021-04-29 PROCEDURE — 99499 UNLISTED E&M SERVICE: CPT | Mod: ,,, | Performed by: SURGERY

## 2021-04-29 PROCEDURE — U0002 COVID-19 LAB TEST NON-CDC: HCPCS | Performed by: SURGERY

## 2021-04-29 RX ORDER — CLINDAMYCIN PHOSPHATE 900 MG/50ML
900 INJECTION, SOLUTION INTRAVENOUS
Status: CANCELLED | OUTPATIENT
Start: 2021-04-29

## 2021-04-29 RX ORDER — ONDANSETRON 2 MG/ML
4 INJECTION INTRAMUSCULAR; INTRAVENOUS EVERY 12 HOURS PRN
Status: CANCELLED | OUTPATIENT
Start: 2021-04-29

## 2021-04-29 RX ORDER — SODIUM CHLORIDE 9 MG/ML
INJECTION, SOLUTION INTRAVENOUS CONTINUOUS
Status: CANCELLED | OUTPATIENT
Start: 2021-04-29

## 2021-04-30 ENCOUNTER — HOSPITAL ENCOUNTER (OUTPATIENT)
Facility: HOSPITAL | Age: 66
Discharge: HOME | End: 2021-05-01
Attending: SURGERY | Admitting: SURGERY
Payer: MEDICARE

## 2021-04-30 ENCOUNTER — ANESTHESIA EVENT (OUTPATIENT)
Dept: SURGERY | Facility: HOSPITAL | Age: 66
End: 2021-04-30
Payer: MEDICARE

## 2021-04-30 ENCOUNTER — ANESTHESIA (OUTPATIENT)
Dept: SURGERY | Facility: HOSPITAL | Age: 66
End: 2021-04-30
Payer: MEDICARE

## 2021-04-30 DIAGNOSIS — L89.159 SACRAL DECUBITUS ULCER: Primary | ICD-10-CM

## 2021-04-30 PROCEDURE — 25000003 PHARM REV CODE 250: Performed by: STUDENT IN AN ORGANIZED HEALTH CARE EDUCATION/TRAINING PROGRAM

## 2021-04-30 PROCEDURE — 37000008 HC ANESTHESIA 1ST 15 MINUTES: Performed by: SURGERY

## 2021-04-30 PROCEDURE — 11043 DBRDMT MUSC&/FSCA 1ST 20/<: CPT | Mod: GC,,, | Performed by: SURGERY

## 2021-04-30 PROCEDURE — 94761 N-INVAS EAR/PLS OXIMETRY MLT: CPT

## 2021-04-30 PROCEDURE — G0378 HOSPITAL OBSERVATION PER HR: HCPCS

## 2021-04-30 PROCEDURE — 36000707: Performed by: SURGERY

## 2021-04-30 PROCEDURE — 71000015 HC POSTOP RECOV 1ST HR: Performed by: SURGERY

## 2021-04-30 PROCEDURE — 25000003 PHARM REV CODE 250: Performed by: NURSE ANESTHETIST, CERTIFIED REGISTERED

## 2021-04-30 PROCEDURE — 11043 PR DEBRIDEMENT, SKIN, SUB-Q TISSUE,MUSCLE,=<20 SQ CM: ICD-10-PCS | Mod: GC,,, | Performed by: SURGERY

## 2021-04-30 PROCEDURE — 37000009 HC ANESTHESIA EA ADD 15 MINS: Performed by: SURGERY

## 2021-04-30 PROCEDURE — 11046 PR DEB MUSC/FASCIA ADD-ON: ICD-10-PCS | Mod: ,,, | Performed by: SURGERY

## 2021-04-30 PROCEDURE — 71000033 HC RECOVERY, INTIAL HOUR: Performed by: SURGERY

## 2021-04-30 PROCEDURE — D9220A PRA ANESTHESIA: Mod: CRNA,,, | Performed by: NURSE ANESTHETIST, CERTIFIED REGISTERED

## 2021-04-30 PROCEDURE — 97606 PR NEG PRESS WOUND THERAPY (NPWT) W/NON-DISPOSABLE WOUND VAC DEVICE (DME), >=50 CM: ICD-10-PCS | Mod: GC,,, | Performed by: SURGERY

## 2021-04-30 PROCEDURE — D9220A PRA ANESTHESIA: Mod: ANES,,, | Performed by: ANESTHESIOLOGY

## 2021-04-30 PROCEDURE — 71000016 HC POSTOP RECOV ADDL HR: Performed by: SURGERY

## 2021-04-30 PROCEDURE — 36000706: Performed by: SURGERY

## 2021-04-30 PROCEDURE — D9220A PRA ANESTHESIA: ICD-10-PCS | Mod: CRNA,,, | Performed by: NURSE ANESTHETIST, CERTIFIED REGISTERED

## 2021-04-30 PROCEDURE — 11046 DBRDMT MUSC&/FSCA EA ADDL: CPT | Mod: ,,, | Performed by: SURGERY

## 2021-04-30 PROCEDURE — D9220A PRA ANESTHESIA: ICD-10-PCS | Mod: ANES,,, | Performed by: ANESTHESIOLOGY

## 2021-04-30 PROCEDURE — 97606 NEG PRS WND THER DME>50 SQCM: CPT | Mod: GC,,, | Performed by: SURGERY

## 2021-04-30 PROCEDURE — 63600175 PHARM REV CODE 636 W HCPCS: Performed by: NURSE ANESTHETIST, CERTIFIED REGISTERED

## 2021-04-30 RX ORDER — CLINDAMYCIN PHOSPHATE 900 MG/50ML
900 INJECTION, SOLUTION INTRAVENOUS
Status: COMPLETED | OUTPATIENT
Start: 2021-04-30 | End: 2021-04-30

## 2021-04-30 RX ORDER — SODIUM CHLORIDE 0.9 % (FLUSH) 0.9 %
10 SYRINGE (ML) INJECTION
Status: DISCONTINUED | OUTPATIENT
Start: 2021-04-30 | End: 2021-05-01 | Stop reason: HOSPADM

## 2021-04-30 RX ORDER — FENTANYL CITRATE 50 UG/ML
25 INJECTION, SOLUTION INTRAMUSCULAR; INTRAVENOUS EVERY 5 MIN PRN
Status: DISCONTINUED | OUTPATIENT
Start: 2021-04-30 | End: 2021-04-30 | Stop reason: HOSPADM

## 2021-04-30 RX ORDER — HYDROCODONE BITARTRATE AND ACETAMINOPHEN 5; 325 MG/1; MG/1
1 TABLET ORAL EVERY 4 HOURS PRN
Status: DISCONTINUED | OUTPATIENT
Start: 2021-04-30 | End: 2021-05-01 | Stop reason: HOSPADM

## 2021-04-30 RX ORDER — ONDANSETRON 2 MG/ML
INJECTION INTRAMUSCULAR; INTRAVENOUS
Status: DISCONTINUED | OUTPATIENT
Start: 2021-04-30 | End: 2021-04-30

## 2021-04-30 RX ORDER — PROPOFOL 10 MG/ML
VIAL (ML) INTRAVENOUS
Status: DISCONTINUED | OUTPATIENT
Start: 2021-04-30 | End: 2021-04-30

## 2021-04-30 RX ORDER — ONDANSETRON 2 MG/ML
4 INJECTION INTRAMUSCULAR; INTRAVENOUS EVERY 12 HOURS PRN
Status: DISCONTINUED | OUTPATIENT
Start: 2021-04-30 | End: 2021-04-30

## 2021-04-30 RX ORDER — ROCURONIUM BROMIDE 10 MG/ML
INJECTION, SOLUTION INTRAVENOUS
Status: DISCONTINUED | OUTPATIENT
Start: 2021-04-30 | End: 2021-04-30

## 2021-04-30 RX ORDER — FENTANYL CITRATE 50 UG/ML
INJECTION, SOLUTION INTRAMUSCULAR; INTRAVENOUS
Status: DISCONTINUED | OUTPATIENT
Start: 2021-04-30 | End: 2021-04-30

## 2021-04-30 RX ORDER — MIDAZOLAM HYDROCHLORIDE 1 MG/ML
INJECTION, SOLUTION INTRAMUSCULAR; INTRAVENOUS
Status: DISCONTINUED | OUTPATIENT
Start: 2021-04-30 | End: 2021-04-30

## 2021-04-30 RX ORDER — CIPROFLOXACIN 500 MG/1
500 TABLET ORAL 2 TIMES DAILY
Status: DISCONTINUED | OUTPATIENT
Start: 2021-04-30 | End: 2021-05-01 | Stop reason: HOSPADM

## 2021-04-30 RX ORDER — ONDANSETRON 2 MG/ML
4 INJECTION INTRAMUSCULAR; INTRAVENOUS DAILY PRN
Status: DISCONTINUED | OUTPATIENT
Start: 2021-04-30 | End: 2021-04-30 | Stop reason: HOSPADM

## 2021-04-30 RX ORDER — SODIUM CHLORIDE 9 MG/ML
INJECTION, SOLUTION INTRAVENOUS CONTINUOUS
Status: DISCONTINUED | OUTPATIENT
Start: 2021-04-30 | End: 2021-04-30

## 2021-04-30 RX ORDER — LIDOCAINE HYDROCHLORIDE 20 MG/ML
INJECTION INTRAVENOUS
Status: DISCONTINUED | OUTPATIENT
Start: 2021-04-30 | End: 2021-04-30

## 2021-04-30 RX ADMIN — HYDROCODONE BITARTRATE AND ACETAMINOPHEN 1 TABLET: 5; 325 TABLET ORAL at 12:04

## 2021-04-30 RX ADMIN — MIDAZOLAM HYDROCHLORIDE 2 MG: 1 INJECTION, SOLUTION INTRAMUSCULAR; INTRAVENOUS at 11:04

## 2021-04-30 RX ADMIN — SODIUM CHLORIDE: 0.9 INJECTION, SOLUTION INTRAVENOUS at 11:04

## 2021-04-30 RX ADMIN — LIDOCAINE HYDROCHLORIDE 100 MG: 20 INJECTION, SOLUTION INTRAVENOUS at 11:04

## 2021-04-30 RX ADMIN — CLINDAMYCIN IN 5 PERCENT DEXTROSE 900 MG: 18 INJECTION, SOLUTION INTRAVENOUS at 11:04

## 2021-04-30 RX ADMIN — CIPROFLOXACIN 500 MG: 500 TABLET, FILM COATED ORAL at 09:04

## 2021-04-30 RX ADMIN — PROPOFOL 200 MG: 10 INJECTION, EMULSION INTRAVENOUS at 11:04

## 2021-04-30 RX ADMIN — GLYCOPYRROLATE 0.2 MG: 0.2 INJECTION, SOLUTION INTRAMUSCULAR; INTRAVITREAL at 11:04

## 2021-04-30 RX ADMIN — SUGAMMADEX 200 MG: 100 INJECTION, SOLUTION INTRAVENOUS at 11:04

## 2021-04-30 RX ADMIN — ROCURONIUM BROMIDE 50 MG: 10 INJECTION, SOLUTION INTRAVENOUS at 11:04

## 2021-04-30 RX ADMIN — ONDANSETRON 4 MG: 2 INJECTION, SOLUTION INTRAMUSCULAR; INTRAVENOUS at 11:04

## 2021-04-30 RX ADMIN — FENTANYL CITRATE 50 MCG: 50 INJECTION, SOLUTION INTRAMUSCULAR; INTRAVENOUS at 11:04

## 2021-05-01 VITALS
DIASTOLIC BLOOD PRESSURE: 71 MMHG | OXYGEN SATURATION: 89 % | HEIGHT: 73 IN | SYSTOLIC BLOOD PRESSURE: 104 MMHG | RESPIRATION RATE: 18 BRPM | TEMPERATURE: 98 F | WEIGHT: 196 LBS | BODY MASS INDEX: 25.98 KG/M2 | HEART RATE: 68 BPM

## 2021-05-01 PROCEDURE — G0378 HOSPITAL OBSERVATION PER HR: HCPCS

## 2021-05-01 PROCEDURE — 25000003 PHARM REV CODE 250: Performed by: STUDENT IN AN ORGANIZED HEALTH CARE EDUCATION/TRAINING PROGRAM

## 2021-05-01 RX ADMIN — CIPROFLOXACIN 500 MG: 500 TABLET, FILM COATED ORAL at 10:05

## 2021-05-01 RX ADMIN — HYDROCODONE BITARTRATE AND ACETAMINOPHEN 1 TABLET: 5; 325 TABLET ORAL at 10:05

## 2021-05-01 RX ADMIN — HYDROCODONE BITARTRATE AND ACETAMINOPHEN 1 TABLET: 5; 325 TABLET ORAL at 03:05

## 2021-05-18 ENCOUNTER — PATIENT MESSAGE (OUTPATIENT)
Dept: WOUND CARE | Facility: CLINIC | Age: 66
End: 2021-05-18

## 2021-09-28 NOTE — ASSESSMENT & PLAN NOTE
- As under sepsis.   PT was told by her insurance company that she needs pre authorization for her upcoming surgery with Dr. Larsen. Patient was unsure who to discuss this with. Please call her to discuss.   Writer verified that pre-certification was met with [facility].   Per Case Request Referral - Pre-certification was required and was approved for CPT Code 31317 for surgery date of 10/06/21. Authorization # [id number].     Call placed to patient to inform her of the above information.   Patient states understanding and has no additional questions or concerns.    ok for shower in 24 h, no hot tub for 1 week

## 2021-10-21 ENCOUNTER — OFFICE VISIT (OUTPATIENT)
Dept: UROLOGY | Facility: CLINIC | Age: 66
End: 2021-10-21
Payer: MEDICARE

## 2021-10-21 VITALS
BODY MASS INDEX: 25.98 KG/M2 | WEIGHT: 196 LBS | SYSTOLIC BLOOD PRESSURE: 110 MMHG | DIASTOLIC BLOOD PRESSURE: 60 MMHG | HEIGHT: 73 IN

## 2021-10-21 DIAGNOSIS — R39.198 SLOW URINARY STREAM: ICD-10-CM

## 2021-10-21 DIAGNOSIS — Z90.5 SOLITARY KIDNEY, ACQUIRED: ICD-10-CM

## 2021-10-21 DIAGNOSIS — N31.9 NEUROGENIC BLADDER: ICD-10-CM

## 2021-10-21 DIAGNOSIS — N13.39 OTHER HYDRONEPHROSIS: Primary | ICD-10-CM

## 2021-10-21 PROCEDURE — 99214 PR OFFICE/OUTPT VISIT, EST, LEVL IV, 30-39 MIN: ICD-10-PCS | Mod: S$GLB,,, | Performed by: NURSE PRACTITIONER

## 2021-10-21 PROCEDURE — 87086 URINE CULTURE/COLONY COUNT: CPT | Performed by: NURSE PRACTITIONER

## 2021-10-21 PROCEDURE — 99214 OFFICE O/P EST MOD 30 MIN: CPT | Mod: S$GLB,,, | Performed by: NURSE PRACTITIONER

## 2021-10-21 RX ORDER — TAMSULOSIN HYDROCHLORIDE 0.4 MG/1
0.4 CAPSULE ORAL DAILY
Qty: 30 CAPSULE | Refills: 11 | Status: SHIPPED | OUTPATIENT
Start: 2021-10-21 | End: 2022-10-21

## 2021-10-21 RX ORDER — SODIUM CHLORIDE 9 MG/ML
INJECTION, SOLUTION INTRAVENOUS CONTINUOUS
Status: CANCELLED | OUTPATIENT
Start: 2021-10-21

## 2021-10-21 RX ORDER — CIPROFLOXACIN 2 MG/ML
400 INJECTION, SOLUTION INTRAVENOUS
Status: CANCELLED | OUTPATIENT
Start: 2021-10-21

## 2021-10-22 ENCOUNTER — TELEPHONE (OUTPATIENT)
Dept: UROLOGY | Facility: CLINIC | Age: 66
End: 2021-10-22

## 2021-10-22 LAB
BACTERIA UR CULT: NORMAL
BACTERIA UR CULT: NORMAL

## 2021-10-25 ENCOUNTER — PATIENT MESSAGE (OUTPATIENT)
Dept: UROLOGY | Facility: CLINIC | Age: 66
End: 2021-10-25
Payer: MEDICARE

## 2021-10-25 DIAGNOSIS — R82.90 ABNORMAL URINALYSIS: Primary | ICD-10-CM

## 2021-10-25 RX ORDER — NITROFURANTOIN 25; 75 MG/1; MG/1
100 CAPSULE ORAL 2 TIMES DAILY
Qty: 14 CAPSULE | Refills: 0 | Status: SHIPPED | OUTPATIENT
Start: 2021-10-25 | End: 2021-11-01

## 2021-10-29 ENCOUNTER — HOSPITAL ENCOUNTER (OUTPATIENT)
Dept: RADIOLOGY | Facility: OTHER | Age: 66
Discharge: HOME | End: 2021-10-29
Attending: NURSE PRACTITIONER
Payer: MEDICARE

## 2021-10-29 ENCOUNTER — HOSPITAL ENCOUNTER (OUTPATIENT)
Dept: PREADMISSION TESTING | Facility: OTHER | Age: 66
Discharge: HOME | End: 2021-10-29
Attending: NURSE PRACTITIONER
Payer: MEDICARE

## 2021-10-29 ENCOUNTER — ANESTHESIA EVENT (OUTPATIENT)
Dept: SURGERY | Facility: OTHER | Age: 66
End: 2021-10-29
Payer: MEDICARE

## 2021-10-29 VITALS
DIASTOLIC BLOOD PRESSURE: 70 MMHG | WEIGHT: 196 LBS | BODY MASS INDEX: 25.98 KG/M2 | HEART RATE: 66 BPM | OXYGEN SATURATION: 100 % | RESPIRATION RATE: 16 BRPM | HEIGHT: 73 IN | SYSTOLIC BLOOD PRESSURE: 132 MMHG | TEMPERATURE: 98 F

## 2021-10-29 DIAGNOSIS — N13.39 OTHER HYDRONEPHROSIS: ICD-10-CM

## 2021-10-29 PROCEDURE — 74176 CT RENAL STONE STUDY ABD PELVIS WO: ICD-10-PCS | Mod: 26,,, | Performed by: RADIOLOGY

## 2021-10-29 PROCEDURE — 74176 CT ABD & PELVIS W/O CONTRAST: CPT | Mod: TC

## 2021-10-29 PROCEDURE — 74176 CT ABD & PELVIS W/O CONTRAST: CPT | Mod: 26,,, | Performed by: RADIOLOGY

## 2021-10-29 RX ORDER — SODIUM CHLORIDE, SODIUM LACTATE, POTASSIUM CHLORIDE, CALCIUM CHLORIDE 600; 310; 30; 20 MG/100ML; MG/100ML; MG/100ML; MG/100ML
INJECTION, SOLUTION INTRAVENOUS CONTINUOUS
Status: CANCELLED | OUTPATIENT
Start: 2021-10-29

## 2021-10-29 RX ORDER — LIDOCAINE HYDROCHLORIDE 10 MG/ML
0.5 INJECTION, SOLUTION EPIDURAL; INFILTRATION; INTRACAUDAL; PERINEURAL ONCE
Status: CANCELLED | OUTPATIENT
Start: 2021-10-29 | End: 2021-10-29

## 2021-11-01 ENCOUNTER — TELEPHONE (OUTPATIENT)
Dept: UROLOGY | Facility: CLINIC | Age: 66
End: 2021-11-01
Payer: MEDICARE

## 2021-11-02 ENCOUNTER — ANESTHESIA (OUTPATIENT)
Dept: SURGERY | Facility: OTHER | Age: 66
End: 2021-11-02
Payer: MEDICARE

## 2021-11-02 ENCOUNTER — HOSPITAL ENCOUNTER (OUTPATIENT)
Facility: OTHER | Age: 66
Discharge: HOME | End: 2021-11-02
Attending: UROLOGY | Admitting: UROLOGY
Payer: MEDICARE

## 2021-11-02 VITALS
SYSTOLIC BLOOD PRESSURE: 130 MMHG | HEART RATE: 78 BPM | WEIGHT: 196 LBS | BODY MASS INDEX: 25.98 KG/M2 | TEMPERATURE: 98 F | RESPIRATION RATE: 16 BRPM | OXYGEN SATURATION: 100 % | DIASTOLIC BLOOD PRESSURE: 74 MMHG | HEIGHT: 73 IN

## 2021-11-02 DIAGNOSIS — N31.9 NEUROGENIC BLADDER: ICD-10-CM

## 2021-11-02 DIAGNOSIS — N13.39 OTHER HYDRONEPHROSIS: ICD-10-CM

## 2021-11-02 DIAGNOSIS — Z96.0 URETERAL STENT RETAINED: Primary | ICD-10-CM

## 2021-11-02 DIAGNOSIS — N13.30 HYDRONEPHROSIS OF RIGHT KIDNEY: ICD-10-CM

## 2021-11-02 DIAGNOSIS — N35.814 OTHER STRICTURE OF ANTERIOR URETHRA IN MALE: ICD-10-CM

## 2021-11-02 PROCEDURE — 25000003 PHARM REV CODE 250: Performed by: UROLOGY

## 2021-11-02 PROCEDURE — 25000003 PHARM REV CODE 250: Performed by: REGISTERED NURSE

## 2021-11-02 PROCEDURE — 37000008 HC ANESTHESIA 1ST 15 MINUTES: Performed by: UROLOGY

## 2021-11-02 PROCEDURE — 74420 PR  X-RAY RETROGRADE PYELOGRAM: ICD-10-PCS | Mod: 26,,, | Performed by: UROLOGY

## 2021-11-02 PROCEDURE — 37000009 HC ANESTHESIA EA ADD 15 MINS: Performed by: UROLOGY

## 2021-11-02 PROCEDURE — 71000015 HC POSTOP RECOV 1ST HR: Performed by: UROLOGY

## 2021-11-02 PROCEDURE — 71000033 HC RECOVERY, INTIAL HOUR: Performed by: UROLOGY

## 2021-11-02 PROCEDURE — 52332 CYSTOSCOPY AND TREATMENT: CPT | Mod: RT,,, | Performed by: UROLOGY

## 2021-11-02 PROCEDURE — 36000707: Performed by: UROLOGY

## 2021-11-02 PROCEDURE — 63600175 PHARM REV CODE 636 W HCPCS: Performed by: NURSE PRACTITIONER

## 2021-11-02 PROCEDURE — C1769 GUIDE WIRE: HCPCS | Performed by: UROLOGY

## 2021-11-02 PROCEDURE — 52332 PR CYSTOSCOPY,INSERT URETERAL STENT: ICD-10-PCS | Mod: RT,,, | Performed by: UROLOGY

## 2021-11-02 PROCEDURE — 74420 UROGRAPHY RTRGR +-KUB: CPT | Mod: 26,,, | Performed by: UROLOGY

## 2021-11-02 PROCEDURE — 27201423 OPTIME MED/SURG SUP & DEVICES STERILE SUPPLY: Performed by: UROLOGY

## 2021-11-02 PROCEDURE — 63600175 PHARM REV CODE 636 W HCPCS: Performed by: REGISTERED NURSE

## 2021-11-02 PROCEDURE — 36000706: Performed by: UROLOGY

## 2021-11-02 PROCEDURE — C2617 STENT, NON-COR, TEM W/O DEL: HCPCS | Performed by: UROLOGY

## 2021-11-02 PROCEDURE — 71000016 HC POSTOP RECOV ADDL HR: Performed by: UROLOGY

## 2021-11-02 DEVICE — STENT URETERAL UNIV 6FR 28CM
Type: IMPLANTABLE DEVICE | Site: URETER | Status: NON-FUNCTIONAL
Removed: 2023-08-01

## 2021-11-02 RX ORDER — PROPOFOL 10 MG/ML
VIAL (ML) INTRAVENOUS
Status: DISCONTINUED | OUTPATIENT
Start: 2021-11-02 | End: 2021-11-02

## 2021-11-02 RX ORDER — TRAMADOL HYDROCHLORIDE 50 MG/1
50 TABLET ORAL EVERY 6 HOURS PRN
Qty: 5 TABLET | Refills: 0 | Status: SHIPPED | OUTPATIENT
Start: 2021-11-02 | End: 2023-01-01

## 2021-11-02 RX ORDER — PHENYLEPHRINE HYDROCHLORIDE 10 MG/ML
INJECTION INTRAVENOUS
Status: DISCONTINUED | OUTPATIENT
Start: 2021-11-02 | End: 2021-11-02

## 2021-11-02 RX ORDER — OXYCODONE HYDROCHLORIDE 5 MG/1
5 TABLET ORAL
Status: DISCONTINUED | OUTPATIENT
Start: 2021-11-02 | End: 2021-11-02 | Stop reason: HOSPADM

## 2021-11-02 RX ORDER — LIDOCAINE HYDROCHLORIDE 10 MG/ML
0.5 INJECTION, SOLUTION EPIDURAL; INFILTRATION; INTRACAUDAL; PERINEURAL ONCE
Status: DISCONTINUED | OUTPATIENT
Start: 2021-11-02 | End: 2021-11-02 | Stop reason: HOSPADM

## 2021-11-02 RX ORDER — CIPROFLOXACIN 2 MG/ML
400 INJECTION, SOLUTION INTRAVENOUS
Status: COMPLETED | OUTPATIENT
Start: 2021-11-02 | End: 2021-11-02

## 2021-11-02 RX ORDER — FENTANYL CITRATE 50 UG/ML
INJECTION, SOLUTION INTRAMUSCULAR; INTRAVENOUS
Status: DISCONTINUED | OUTPATIENT
Start: 2021-11-02 | End: 2021-11-02

## 2021-11-02 RX ORDER — LIDOCAINE HCL/PF 100 MG/5ML
SYRINGE (ML) INTRAVENOUS
Status: DISCONTINUED | OUTPATIENT
Start: 2021-11-02 | End: 2021-11-02

## 2021-11-02 RX ORDER — PROCHLORPERAZINE EDISYLATE 5 MG/ML
5 INJECTION INTRAMUSCULAR; INTRAVENOUS EVERY 30 MIN PRN
Status: DISCONTINUED | OUTPATIENT
Start: 2021-11-02 | End: 2021-11-02 | Stop reason: HOSPADM

## 2021-11-02 RX ORDER — ONDANSETRON 2 MG/ML
INJECTION INTRAMUSCULAR; INTRAVENOUS
Status: DISCONTINUED | OUTPATIENT
Start: 2021-11-02 | End: 2021-11-02

## 2021-11-02 RX ORDER — SODIUM CHLORIDE, SODIUM LACTATE, POTASSIUM CHLORIDE, CALCIUM CHLORIDE 600; 310; 30; 20 MG/100ML; MG/100ML; MG/100ML; MG/100ML
INJECTION, SOLUTION INTRAVENOUS CONTINUOUS
Status: DISCONTINUED | OUTPATIENT
Start: 2021-11-02 | End: 2021-11-02 | Stop reason: HOSPADM

## 2021-11-02 RX ORDER — DEXAMETHASONE SODIUM PHOSPHATE 4 MG/ML
INJECTION, SOLUTION INTRA-ARTICULAR; INTRALESIONAL; INTRAMUSCULAR; INTRAVENOUS; SOFT TISSUE
Status: DISCONTINUED | OUTPATIENT
Start: 2021-11-02 | End: 2021-11-02

## 2021-11-02 RX ORDER — HYDROMORPHONE HYDROCHLORIDE 2 MG/ML
0.4 INJECTION, SOLUTION INTRAMUSCULAR; INTRAVENOUS; SUBCUTANEOUS EVERY 5 MIN PRN
Status: DISCONTINUED | OUTPATIENT
Start: 2021-11-02 | End: 2021-11-02 | Stop reason: HOSPADM

## 2021-11-02 RX ORDER — SODIUM CHLORIDE 9 MG/ML
INJECTION, SOLUTION INTRAVENOUS CONTINUOUS
Status: DISCONTINUED | OUTPATIENT
Start: 2021-11-02 | End: 2021-11-02 | Stop reason: HOSPADM

## 2021-11-02 RX ORDER — SODIUM CHLORIDE 0.9 % (FLUSH) 0.9 %
3 SYRINGE (ML) INJECTION
Status: DISCONTINUED | OUTPATIENT
Start: 2021-11-02 | End: 2021-11-02 | Stop reason: HOSPADM

## 2021-11-02 RX ORDER — MIDAZOLAM HYDROCHLORIDE 1 MG/ML
INJECTION INTRAMUSCULAR; INTRAVENOUS
Status: DISCONTINUED | OUTPATIENT
Start: 2021-11-02 | End: 2021-11-02

## 2021-11-02 RX ORDER — ATROPA BELLADONNA AND OPIUM 16.2; 6 MG/1; MG/1
SUPPOSITORY RECTAL
Status: DISCONTINUED | OUTPATIENT
Start: 2021-11-02 | End: 2021-11-02 | Stop reason: HOSPADM

## 2021-11-02 RX ORDER — MEPERIDINE HYDROCHLORIDE 25 MG/ML
12.5 INJECTION INTRAMUSCULAR; INTRAVENOUS; SUBCUTANEOUS ONCE AS NEEDED
Status: DISCONTINUED | OUTPATIENT
Start: 2021-11-02 | End: 2021-11-02 | Stop reason: HOSPADM

## 2021-11-02 RX ADMIN — MIDAZOLAM HYDROCHLORIDE 2 MG: 1 INJECTION, SOLUTION INTRAMUSCULAR; INTRAVENOUS at 12:11

## 2021-11-02 RX ADMIN — CARBOXYMETHYLCELLULOSE SODIUM 1 DROP: 2.5 SOLUTION/ DROPS OPHTHALMIC at 12:11

## 2021-11-02 RX ADMIN — PHENYLEPHRINE HYDROCHLORIDE 100 MCG: 10 INJECTION INTRAVENOUS at 12:11

## 2021-11-02 RX ADMIN — ONDANSETRON HYDROCHLORIDE 4 MG: 2 INJECTION INTRAMUSCULAR; INTRAVENOUS at 12:11

## 2021-11-02 RX ADMIN — LIDOCAINE HYDROCHLORIDE 60 MG: 20 INJECTION, SOLUTION INTRAVENOUS at 12:11

## 2021-11-02 RX ADMIN — GLYCOPYRROLATE 0.2 MG: 0.2 INJECTION, SOLUTION INTRAMUSCULAR; INTRAVITREAL at 12:11

## 2021-11-02 RX ADMIN — PROPOFOL 150 MG: 10 INJECTION, EMULSION INTRAVENOUS at 12:11

## 2021-11-02 RX ADMIN — FENTANYL CITRATE 50 MCG: 50 INJECTION, SOLUTION INTRAMUSCULAR; INTRAVENOUS at 12:11

## 2021-11-02 RX ADMIN — DEXAMETHASONE SODIUM PHOSPHATE 8 MG: 4 INJECTION, SOLUTION INTRAMUSCULAR; INTRAVENOUS at 12:11

## 2021-11-02 RX ADMIN — PHENYLEPHRINE HYDROCHLORIDE 100 MCG: 10 INJECTION INTRAVENOUS at 01:11

## 2021-11-02 RX ADMIN — CIPROFLOXACIN 400 MG: 2 INJECTION, SOLUTION INTRAVENOUS at 12:11

## 2021-11-10 ENCOUNTER — OFFICE VISIT (OUTPATIENT)
Dept: UROLOGY | Facility: CLINIC | Age: 66
End: 2021-11-10
Payer: MEDICARE

## 2021-11-10 VITALS
HEART RATE: 75 BPM | SYSTOLIC BLOOD PRESSURE: 121 MMHG | WEIGHT: 196 LBS | BODY MASS INDEX: 25.98 KG/M2 | HEIGHT: 73 IN | DIASTOLIC BLOOD PRESSURE: 69 MMHG

## 2021-11-10 DIAGNOSIS — N39.0 RECURRENT UTI: ICD-10-CM

## 2021-11-10 DIAGNOSIS — N13.39 OTHER HYDRONEPHROSIS: Primary | ICD-10-CM

## 2021-11-10 DIAGNOSIS — N35.919 STRICTURE OF MALE URETHRA, UNSPECIFIED STRICTURE TYPE: ICD-10-CM

## 2021-11-10 DIAGNOSIS — Z90.5 SOLITARY KIDNEY, ACQUIRED: ICD-10-CM

## 2021-11-10 PROCEDURE — 51700 IRRIGATION OF BLADDER: CPT | Mod: S$GLB,,, | Performed by: NURSE PRACTITIONER

## 2021-11-10 PROCEDURE — 99214 OFFICE O/P EST MOD 30 MIN: CPT | Mod: 25,S$GLB,, | Performed by: NURSE PRACTITIONER

## 2021-11-10 PROCEDURE — 51700 PR IRRIGATION, BLADDER: ICD-10-PCS | Mod: S$GLB,,, | Performed by: NURSE PRACTITIONER

## 2021-11-10 PROCEDURE — 99214 PR OFFICE/OUTPT VISIT, EST, LEVL IV, 30-39 MIN: ICD-10-PCS | Mod: 25,S$GLB,, | Performed by: NURSE PRACTITIONER

## 2021-11-10 RX ORDER — TRAMADOL HYDROCHLORIDE 50 MG/1
50 TABLET ORAL EVERY 6 HOURS
Qty: 10 TABLET | Refills: 0 | Status: SHIPPED | OUTPATIENT
Start: 2021-11-10 | End: 2023-01-01

## 2021-12-01 ENCOUNTER — PATIENT MESSAGE (OUTPATIENT)
Dept: UROLOGY | Facility: CLINIC | Age: 66
End: 2021-12-01
Payer: MEDICARE

## 2021-12-01 DIAGNOSIS — N39.0 RECURRENT UTI: Primary | ICD-10-CM

## 2021-12-06 ENCOUNTER — TELEPHONE (OUTPATIENT)
Dept: UROLOGY | Facility: CLINIC | Age: 66
End: 2021-12-06
Payer: MEDICARE

## 2021-12-07 ENCOUNTER — TELEPHONE (OUTPATIENT)
Dept: UROLOGY | Facility: CLINIC | Age: 66
End: 2021-12-07
Payer: MEDICARE

## 2021-12-08 ENCOUNTER — TELEPHONE (OUTPATIENT)
Dept: UROLOGY | Facility: CLINIC | Age: 66
End: 2021-12-08
Payer: MEDICARE

## 2021-12-08 DIAGNOSIS — N30.00 ACUTE CYSTITIS WITHOUT HEMATURIA: Primary | ICD-10-CM

## 2021-12-08 RX ORDER — CIPROFLOXACIN 500 MG/1
500 TABLET ORAL 2 TIMES DAILY
Qty: 20 TABLET | Refills: 0 | Status: SHIPPED | OUTPATIENT
Start: 2021-12-08 | End: 2021-12-18

## 2022-01-03 ENCOUNTER — TELEPHONE (OUTPATIENT)
Dept: UROLOGY | Facility: CLINIC | Age: 67
End: 2022-01-03
Payer: MEDICARE

## 2022-01-03 NOTE — TELEPHONE ENCOUNTER
Patient nurse isabella stated the patient pcp order lab and it was done at [facility]. Nurse batista informed to contact the pcp to see if he will give a script to the patient do to he is in pain. Nurse batista informed to have [facility] fax labs number given. Isabella stated that the pcp stated have the urologist order the medication.

## 2022-01-03 NOTE — TELEPHONE ENCOUNTER
----- Message from Rosalinda Sahu sent at 1/3/2022  3:38 PM CST -----  Name of Who is Calling: Isabella (Formerly [facility])         What is the request in detail: Isabella is calling to see if the office received the patient results and also discuss the problems the patient is having. Please advise         Can the clinic reply by [facility]: No         What Number [facility] Back [facility]: [phone]

## 2022-01-19 ENCOUNTER — PATIENT OUTREACH (OUTPATIENT)
Dept: ADMINISTRATIVE | Facility: HOSPITAL | Age: 67
End: 2022-01-19
Payer: MEDICARE

## 2022-01-19 NOTE — PROGRESS NOTES
Non-compliant report chart audits. Chart review completed for  test overdue (mammograms, Colonoscopies, pap smears, DM labs, and/or EYE EXAMs)  06/18/2020    Care Everywhere and media, updates requested and reviewed.      [facility] and [facility] reviewed.     HM updated with external [facility] report.

## 2022-01-28 DIAGNOSIS — R39.9 UTI SYMPTOMS: Primary | ICD-10-CM

## 2022-01-28 RX ORDER — NITROFURANTOIN 25; 75 MG/1; MG/1
100 CAPSULE ORAL 2 TIMES DAILY
Qty: 14 CAPSULE | Refills: 0 | Status: SHIPPED | OUTPATIENT
Start: 2022-01-28 | End: 2022-02-04

## 2022-02-02 DIAGNOSIS — N13.39 OTHER HYDRONEPHROSIS: Primary | ICD-10-CM

## 2022-11-02 ENCOUNTER — LAB VISIT (OUTPATIENT)
Dept: LAB | Facility: OTHER | Age: 67
End: 2022-11-02
Attending: INTERNAL MEDICINE

## 2022-11-02 DIAGNOSIS — E78.01 HYPERLIPIDEMIA TYPE II: ICD-10-CM

## 2022-11-02 DIAGNOSIS — Z12.5 SCREENING FOR PROSTATE CANCER: ICD-10-CM

## 2022-11-02 DIAGNOSIS — R73.9 ELEVATED BLOOD SUGAR: ICD-10-CM

## 2022-11-02 DIAGNOSIS — I10 ESSENTIAL HYPERTENSION: ICD-10-CM

## 2022-11-02 DIAGNOSIS — R53.83 FATIGUE, UNSPECIFIED TYPE: ICD-10-CM

## 2022-11-02 LAB
ALBUMIN SERPL BCP-MCNC: 3.1 G/DL (ref 3.5–5.2)
ALP SERPL-CCNC: 66 U/L (ref 55–135)
ALT SERPL W/O P-5'-P-CCNC: 13 U/L (ref 10–44)
ANION GAP SERPL CALC-SCNC: 9 MMOL/L (ref 8–16)
AST SERPL-CCNC: 13 U/L (ref 10–40)
BASOPHILS # BLD AUTO: 0.05 K/UL (ref 0–0.2)
BASOPHILS NFR BLD: 0.4 % (ref 0–1.9)
BILIRUB SERPL-MCNC: 0.3 MG/DL (ref 0.1–1)
BUN SERPL-MCNC: 21 MG/DL (ref 8–23)
CALCIUM SERPL-MCNC: 9.9 MG/DL (ref 8.7–10.5)
CHLORIDE SERPL-SCNC: 102 MMOL/L (ref 95–110)
CHOLEST SERPL-MCNC: 84 MG/DL (ref 120–199)
CHOLEST/HDLC SERPL: 2.4 {RATIO} (ref 2–5)
CO2 SERPL-SCNC: 31 MMOL/L (ref 23–29)
COMPLEXED PSA SERPL-MCNC: 1.4 NG/ML (ref 0–4)
CREAT SERPL-MCNC: 1.2 MG/DL (ref 0.5–1.4)
DIFFERENTIAL METHOD: ABNORMAL
EOSINOPHIL # BLD AUTO: 0.5 K/UL (ref 0–0.5)
EOSINOPHIL NFR BLD: 3.6 % (ref 0–8)
ERYTHROCYTE [DISTWIDTH] IN BLOOD BY AUTOMATED COUNT: 19.6 % (ref 11.5–14.5)
EST. GFR  (NO RACE VARIABLE): >60 ML/MIN/1.73 M^2
ESTIMATED AVG GLUCOSE: 97 MG/DL (ref 68–131)
GLUCOSE SERPL-MCNC: 101 MG/DL (ref 70–110)
HBA1C MFR BLD: 5 % (ref 4–5.6)
HCT VFR BLD AUTO: 36.2 % (ref 40–54)
HDLC SERPL-MCNC: 35 MG/DL (ref 40–75)
HDLC SERPL: 41.7 % (ref 20–50)
HGB BLD-MCNC: 11.1 G/DL (ref 14–18)
IMM GRANULOCYTES # BLD AUTO: 0.03 K/UL (ref 0–0.04)
IMM GRANULOCYTES NFR BLD AUTO: 0.2 % (ref 0–0.5)
LDLC SERPL CALC-MCNC: 31 MG/DL (ref 63–159)
LYMPHOCYTES # BLD AUTO: 4 K/UL (ref 1–4.8)
LYMPHOCYTES NFR BLD: 31.6 % (ref 18–48)
MCH RBC QN AUTO: 24.2 PG (ref 27–31)
MCHC RBC AUTO-ENTMCNC: 30.7 G/DL (ref 32–36)
MCV RBC AUTO: 79 FL (ref 82–98)
MONOCYTES # BLD AUTO: 1.4 K/UL (ref 0.3–1)
MONOCYTES NFR BLD: 11.1 % (ref 4–15)
NEUTROPHILS # BLD AUTO: 6.8 K/UL (ref 1.8–7.7)
NEUTROPHILS NFR BLD: 53.1 % (ref 38–73)
NONHDLC SERPL-MCNC: 49 MG/DL
NRBC BLD-RTO: 0 /100 WBC
PLATELET # BLD AUTO: 552 K/UL (ref 150–450)
PMV BLD AUTO: 9.7 FL (ref 9.2–12.9)
POTASSIUM SERPL-SCNC: 3.2 MMOL/L (ref 3.5–5.1)
PROT SERPL-MCNC: 8.6 G/DL (ref 6–8.4)
RBC # BLD AUTO: 4.58 M/UL (ref 4.6–6.2)
SODIUM SERPL-SCNC: 142 MMOL/L (ref 136–145)
T4 SERPL-MCNC: 7 UG/DL (ref 4.5–11.5)
TRIGL SERPL-MCNC: 90 MG/DL (ref 30–150)
TSH SERPL DL<=0.005 MIU/L-ACNC: 1.77 UIU/ML (ref 0.4–4)
WBC # BLD AUTO: 12.74 K/UL (ref 3.9–12.7)

## 2022-11-02 PROCEDURE — 80061 LIPID PANEL: CPT | Performed by: INTERNAL MEDICINE

## 2022-11-02 PROCEDURE — 80053 COMPREHEN METABOLIC PANEL: CPT | Performed by: INTERNAL MEDICINE

## 2022-11-02 PROCEDURE — 36415 COLL VENOUS BLD VENIPUNCTURE: CPT | Performed by: INTERNAL MEDICINE

## 2022-11-02 PROCEDURE — 85025 COMPLETE CBC W/AUTO DIFF WBC: CPT | Performed by: INTERNAL MEDICINE

## 2022-11-02 PROCEDURE — 83036 HEMOGLOBIN GLYCOSYLATED A1C: CPT | Performed by: INTERNAL MEDICINE

## 2022-11-02 PROCEDURE — 84153 ASSAY OF PSA TOTAL: CPT | Performed by: INTERNAL MEDICINE

## 2022-11-02 PROCEDURE — 84443 ASSAY THYROID STIM HORMONE: CPT | Performed by: INTERNAL MEDICINE

## 2022-11-02 PROCEDURE — 84436 ASSAY OF TOTAL THYROXINE: CPT | Performed by: INTERNAL MEDICINE

## 2023-01-01 ENCOUNTER — HOSPITAL ENCOUNTER (OUTPATIENT)
Dept: RADIOLOGY | Facility: OTHER | Age: 68
Discharge: HOME | End: 2023-08-11
Attending: NURSE PRACTITIONER
Payer: MEDICARE

## 2023-01-01 ENCOUNTER — HOSPITAL ENCOUNTER (OUTPATIENT)
Dept: RADIOLOGY | Facility: OTHER | Age: 68
Discharge: HOME | End: 2023-07-31
Attending: NURSE PRACTITIONER
Payer: MEDICARE

## 2023-01-01 ENCOUNTER — OFFICE VISIT (OUTPATIENT)
Dept: UROLOGY | Facility: CLINIC | Age: 68
End: 2023-01-01
Payer: MEDICARE

## 2023-01-01 ENCOUNTER — HOSPITAL ENCOUNTER (OUTPATIENT)
Facility: OTHER | Age: 68
Discharge: HOME | End: 2023-08-01
Attending: UROLOGY | Admitting: UROLOGY
Payer: MEDICARE

## 2023-01-01 ENCOUNTER — TELEPHONE (OUTPATIENT)
Dept: UROLOGY | Facility: CLINIC | Age: 68
End: 2023-01-01
Payer: MEDICARE

## 2023-01-01 ENCOUNTER — ANESTHESIA (OUTPATIENT)
Dept: SURGERY | Facility: OTHER | Age: 68
End: 2023-01-01
Payer: MEDICARE

## 2023-01-01 ENCOUNTER — ANESTHESIA EVENT (OUTPATIENT)
Dept: SURGERY | Facility: OTHER | Age: 68
End: 2023-01-01
Payer: MEDICARE

## 2023-01-01 VITALS
DIASTOLIC BLOOD PRESSURE: 57 MMHG | WEIGHT: 190.06 LBS | HEART RATE: 70 BPM | HEIGHT: 73 IN | BODY MASS INDEX: 25.19 KG/M2 | OXYGEN SATURATION: 99 % | SYSTOLIC BLOOD PRESSURE: 106 MMHG

## 2023-01-01 VITALS
TEMPERATURE: 98 F | HEART RATE: 71 BPM | SYSTOLIC BLOOD PRESSURE: 106 MMHG | HEIGHT: 73 IN | OXYGEN SATURATION: 100 % | DIASTOLIC BLOOD PRESSURE: 56 MMHG | BODY MASS INDEX: 25.18 KG/M2 | WEIGHT: 190 LBS | RESPIRATION RATE: 18 BRPM

## 2023-01-01 VITALS
HEIGHT: 73 IN | WEIGHT: 190.06 LBS | SYSTOLIC BLOOD PRESSURE: 107 MMHG | OXYGEN SATURATION: 100 % | DIASTOLIC BLOOD PRESSURE: 57 MMHG | BODY MASS INDEX: 25.19 KG/M2 | HEART RATE: 66 BPM

## 2023-01-01 DIAGNOSIS — Z90.5 SOLITARY KIDNEY, ACQUIRED: ICD-10-CM

## 2023-01-01 DIAGNOSIS — Z90.5 SOLITARY KIDNEY, ACQUIRED: Primary | ICD-10-CM

## 2023-01-01 DIAGNOSIS — N32.89 BLADDER SPASMS: Primary | ICD-10-CM

## 2023-01-01 DIAGNOSIS — Z96.0 RETAINED URETERAL STENT: Primary | ICD-10-CM

## 2023-01-01 DIAGNOSIS — N13.30 HYDRONEPHROSIS, UNSPECIFIED HYDRONEPHROSIS TYPE: ICD-10-CM

## 2023-01-01 DIAGNOSIS — N31.9 NEUROGENIC BLADDER: Primary | ICD-10-CM

## 2023-01-01 DIAGNOSIS — Z96.0 RETAINED URETERAL STENT: ICD-10-CM

## 2023-01-01 DIAGNOSIS — R82.90 ABNORMAL URINALYSIS: Primary | ICD-10-CM

## 2023-01-01 DIAGNOSIS — Z01.818 PRE-OP TESTING: ICD-10-CM

## 2023-01-01 DIAGNOSIS — N39.0 FREQUENT UTI: ICD-10-CM

## 2023-01-01 LAB
ANION GAP SERPL CALC-SCNC: 12 MMOL/L (ref 8–16)
BACTERIA UR CULT: NORMAL
BACTERIA UR CULT: NORMAL
BUN SERPL-MCNC: 22 MG/DL (ref 8–23)
CALCIUM SERPL-MCNC: 9.4 MG/DL (ref 8.7–10.5)
CHLORIDE SERPL-SCNC: 99 MMOL/L (ref 95–110)
CO2 SERPL-SCNC: 25 MMOL/L (ref 23–29)
CREAT SERPL-MCNC: 1.4 MG/DL (ref 0.5–1.4)
EST. GFR  (NO RACE VARIABLE): 55 ML/MIN/1.73 M^2
GLUCOSE SERPL-MCNC: 110 MG/DL (ref 70–110)
POTASSIUM SERPL-SCNC: 3.4 MMOL/L (ref 3.5–5.1)
SODIUM SERPL-SCNC: 136 MMOL/L (ref 136–145)

## 2023-01-01 PROCEDURE — 36000706: Performed by: UROLOGY

## 2023-01-01 PROCEDURE — D9220A PRA ANESTHESIA: ICD-10-PCS | Mod: CRNA,,, | Performed by: NURSE ANESTHETIST, CERTIFIED REGISTERED

## 2023-01-01 PROCEDURE — 74420 PR  X-RAY RETROGRADE PYELOGRAM: ICD-10-PCS | Mod: 26,,, | Performed by: UROLOGY

## 2023-01-01 PROCEDURE — 99214 PR OFFICE/OUTPT VISIT, EST, LEVL IV, 30-39 MIN: ICD-10-PCS | Mod: S$GLB,,, | Performed by: NURSE PRACTITIONER

## 2023-01-01 PROCEDURE — 25000003 PHARM REV CODE 250: Performed by: NURSE ANESTHETIST, CERTIFIED REGISTERED

## 2023-01-01 PROCEDURE — 80048 BASIC METABOLIC PNL TOTAL CA: CPT | Performed by: UROLOGY

## 2023-01-01 PROCEDURE — 74176 CT ABD & PELVIS W/O CONTRAST: CPT | Mod: 26,,, | Performed by: RADIOLOGY

## 2023-01-01 PROCEDURE — 99213 OFFICE O/P EST LOW 20 MIN: CPT | Mod: S$GLB,,, | Performed by: NURSE PRACTITIONER

## 2023-01-01 PROCEDURE — 71000033 HC RECOVERY, INTIAL HOUR: Performed by: UROLOGY

## 2023-01-01 PROCEDURE — 87086 URINE CULTURE/COLONY COUNT: CPT | Performed by: NURSE PRACTITIONER

## 2023-01-01 PROCEDURE — C1769 GUIDE WIRE: HCPCS | Performed by: UROLOGY

## 2023-01-01 PROCEDURE — 74420 UROGRAPHY RTRGR +-KUB: CPT | Mod: 26,,, | Performed by: UROLOGY

## 2023-01-01 PROCEDURE — 52310 CYSTOSCOPY AND TREATMENT: CPT | Mod: RT,,, | Performed by: UROLOGY

## 2023-01-01 PROCEDURE — D9220A PRA ANESTHESIA: ICD-10-PCS | Mod: ANES,,, | Performed by: ANESTHESIOLOGY

## 2023-01-01 PROCEDURE — 74176 CT ABD & PELVIS W/O CONTRAST: CPT | Mod: TC

## 2023-01-01 PROCEDURE — D9220A PRA ANESTHESIA: Mod: CRNA,,, | Performed by: NURSE ANESTHETIST, CERTIFIED REGISTERED

## 2023-01-01 PROCEDURE — 74176 CT RENAL STONE STUDY ABD PELVIS WO: ICD-10-PCS | Mod: 26,,, | Performed by: RADIOLOGY

## 2023-01-01 PROCEDURE — 25000003 PHARM REV CODE 250: Performed by: NURSE PRACTITIONER

## 2023-01-01 PROCEDURE — 76770 US RETROPERITONEAL COMPLETE: ICD-10-PCS | Mod: 26,,, | Performed by: RADIOLOGY

## 2023-01-01 PROCEDURE — 99213 PR OFFICE/OUTPT VISIT, EST, LEVL III, 20-29 MIN: ICD-10-PCS | Mod: S$GLB,,, | Performed by: NURSE PRACTITIONER

## 2023-01-01 PROCEDURE — 37000009 HC ANESTHESIA EA ADD 15 MINS: Performed by: UROLOGY

## 2023-01-01 PROCEDURE — 37000008 HC ANESTHESIA 1ST 15 MINUTES: Performed by: UROLOGY

## 2023-01-01 PROCEDURE — 36000707: Performed by: UROLOGY

## 2023-01-01 PROCEDURE — 25500020 PHARM REV CODE 255: Performed by: UROLOGY

## 2023-01-01 PROCEDURE — C1758 CATHETER, URETERAL: HCPCS | Performed by: UROLOGY

## 2023-01-01 PROCEDURE — 76770 US EXAM ABDO BACK WALL COMP: CPT | Mod: TC

## 2023-01-01 PROCEDURE — 63600175 PHARM REV CODE 636 W HCPCS: Performed by: NURSE ANESTHETIST, CERTIFIED REGISTERED

## 2023-01-01 PROCEDURE — 63600175 PHARM REV CODE 636 W HCPCS: Performed by: ANESTHESIOLOGY

## 2023-01-01 PROCEDURE — 36415 COLL VENOUS BLD VENIPUNCTURE: CPT | Performed by: UROLOGY

## 2023-01-01 PROCEDURE — 76770 US EXAM ABDO BACK WALL COMP: CPT | Mod: 26,,, | Performed by: RADIOLOGY

## 2023-01-01 PROCEDURE — 71000016 HC POSTOP RECOV ADDL HR: Performed by: UROLOGY

## 2023-01-01 PROCEDURE — 52310 PR CYSTOSCOPY,REMV CALCULUS,SIMPLE: ICD-10-PCS | Mod: RT,,, | Performed by: UROLOGY

## 2023-01-01 PROCEDURE — D9220A PRA ANESTHESIA: Mod: ANES,,, | Performed by: ANESTHESIOLOGY

## 2023-01-01 PROCEDURE — 99214 OFFICE O/P EST MOD 30 MIN: CPT | Mod: S$GLB,,, | Performed by: NURSE PRACTITIONER

## 2023-01-01 PROCEDURE — 71000015 HC POSTOP RECOV 1ST HR: Performed by: UROLOGY

## 2023-01-01 RX ORDER — SODIUM CHLORIDE 9 MG/ML
INJECTION, SOLUTION INTRAVENOUS CONTINUOUS
Status: DISCONTINUED | OUTPATIENT
Start: 2023-01-01 | End: 2023-01-01 | Stop reason: HOSPADM

## 2023-01-01 RX ORDER — PROCHLORPERAZINE EDISYLATE 5 MG/ML
5 INJECTION INTRAMUSCULAR; INTRAVENOUS EVERY 30 MIN PRN
Status: DISCONTINUED | OUTPATIENT
Start: 2023-01-01 | End: 2023-01-01 | Stop reason: HOSPADM

## 2023-01-01 RX ORDER — LIDOCAINE HYDROCHLORIDE 20 MG/ML
INJECTION INTRAVENOUS
Status: DISCONTINUED | OUTPATIENT
Start: 2023-01-01 | End: 2023-01-01

## 2023-01-01 RX ORDER — SODIUM CHLORIDE 0.9 % (FLUSH) 0.9 %
3 SYRINGE (ML) INJECTION
Status: DISCONTINUED | OUTPATIENT
Start: 2023-01-01 | End: 2023-01-01 | Stop reason: HOSPADM

## 2023-01-01 RX ORDER — PROPOFOL 10 MG/ML
VIAL (ML) INTRAVENOUS
Status: DISCONTINUED | OUTPATIENT
Start: 2023-01-01 | End: 2023-01-01

## 2023-01-01 RX ORDER — FENTANYL CITRATE 50 UG/ML
INJECTION, SOLUTION INTRAMUSCULAR; INTRAVENOUS
Status: DISCONTINUED | OUTPATIENT
Start: 2023-01-01 | End: 2023-01-01

## 2023-01-01 RX ORDER — MEPERIDINE HYDROCHLORIDE 25 MG/ML
12.5 INJECTION INTRAMUSCULAR; INTRAVENOUS; SUBCUTANEOUS ONCE AS NEEDED
Status: DISCONTINUED | OUTPATIENT
Start: 2023-01-01 | End: 2023-01-01 | Stop reason: HOSPADM

## 2023-01-01 RX ORDER — HYDROMORPHONE HYDROCHLORIDE 2 MG/ML
0.4 INJECTION, SOLUTION INTRAMUSCULAR; INTRAVENOUS; SUBCUTANEOUS EVERY 5 MIN PRN
Status: DISCONTINUED | OUTPATIENT
Start: 2023-01-01 | End: 2023-01-01 | Stop reason: HOSPADM

## 2023-01-01 RX ORDER — CIPROFLOXACIN 250 MG/1
500 TABLET, FILM COATED ORAL
Status: CANCELLED | OUTPATIENT
Start: 2023-01-01

## 2023-01-01 RX ORDER — OXYCODONE HYDROCHLORIDE 5 MG/1
5 TABLET ORAL
Status: DISCONTINUED | OUTPATIENT
Start: 2023-01-01 | End: 2023-01-01 | Stop reason: HOSPADM

## 2023-01-01 RX ORDER — NITROFURANTOIN 25; 75 MG/1; MG/1
100 CAPSULE ORAL 2 TIMES DAILY
Qty: 14 CAPSULE | Refills: 0 | Status: SHIPPED | OUTPATIENT
Start: 2023-01-01 | End: 2023-01-01

## 2023-01-01 RX ORDER — PHENYLEPHRINE HYDROCHLORIDE 10 MG/ML
INJECTION INTRAVENOUS
Status: DISCONTINUED | OUTPATIENT
Start: 2023-01-01 | End: 2023-01-01

## 2023-01-01 RX ORDER — SODIUM CHLORIDE 9 MG/ML
INJECTION, SOLUTION INTRAVENOUS CONTINUOUS
Status: CANCELLED | OUTPATIENT
Start: 2023-01-01

## 2023-01-01 RX ORDER — CIPROFLOXACIN 500 MG/1
500 TABLET ORAL
Status: COMPLETED | OUTPATIENT
Start: 2023-01-01 | End: 2023-01-01

## 2023-01-01 RX ORDER — SODIUM CHLORIDE, SODIUM LACTATE, POTASSIUM CHLORIDE, CALCIUM CHLORIDE 600; 310; 30; 20 MG/100ML; MG/100ML; MG/100ML; MG/100ML
INJECTION, SOLUTION INTRAVENOUS CONTINUOUS
Status: DISCONTINUED | OUTPATIENT
Start: 2023-01-01 | End: 2023-01-01 | Stop reason: HOSPADM

## 2023-01-01 RX ORDER — GENTAMICIN SULFATE 40 MG/ML
INJECTION, SOLUTION INTRAMUSCULAR; INTRAVENOUS
Status: DISCONTINUED | OUTPATIENT
Start: 2023-01-01 | End: 2023-01-01

## 2023-01-01 RX ORDER — OXYBUTYNIN CHLORIDE 10 MG/1
10 TABLET, EXTENDED RELEASE ORAL DAILY
Qty: 30 TABLET | Refills: 11 | Status: SHIPPED | OUTPATIENT
Start: 2023-01-01 | End: 2024-01-01 | Stop reason: SINTOL

## 2023-01-01 RX ADMIN — GENTAMICIN SULFATE 80 MG: 40 INJECTION, SOLUTION INTRAMUSCULAR; INTRAVENOUS at 10:08

## 2023-01-01 RX ADMIN — PHENYLEPHRINE HYDROCHLORIDE 100 MCG: 10 INJECTION INTRAVENOUS at 10:08

## 2023-01-01 RX ADMIN — CIPROFLOXACIN 500 MG: 500 TABLET, FILM COATED ORAL at 08:08

## 2023-01-01 RX ADMIN — FENTANYL CITRATE 100 MCG: 50 INJECTION, SOLUTION INTRAMUSCULAR; INTRAVENOUS at 10:08

## 2023-01-01 RX ADMIN — PROPOFOL 180 MG: 10 INJECTION, EMULSION INTRAVENOUS at 10:08

## 2023-01-01 RX ADMIN — SODIUM CHLORIDE, SODIUM LACTATE, POTASSIUM CHLORIDE, AND CALCIUM CHLORIDE: 600; 310; 30; 20 INJECTION, SOLUTION INTRAVENOUS at 10:08

## 2023-01-01 RX ADMIN — LIDOCAINE HYDROCHLORIDE 100 MG: 20 INJECTION, SOLUTION INTRAVENOUS at 10:08

## 2023-07-18 NOTE — Clinical Note
Bina- please schedule CT stone study  Kenyetta- please schedule cysto with stent exchange with Dr. Adams next available after the CT scan. His next OR dates with openings are August 1, 2,8,16, or the 22nd. Let me know the date you pick so I can place orders. Consent is on your desk. Thanks

## 2023-07-18 NOTE — H&P
"Subjective:      Maciel Contreras is a 68 y.o. male who returns today regarding his ureteral stent.    The patient has an extensive medical history.      He has a neurogenic bladder and solitary right kidney 2/2 SCI from [facility] many years ago.  His bladder is managed with an indwelling garcia catheter- changed monthly by home health (last exchanged yesterday).    He developed a resistant/persistent UTIs in 2021. In the ED his creatinine was noted to be 4.4 and WBC was 35,000. CT showed "Small pneumoperitoneum.  Fluid and air bubbles tracking in the anterior of the abdomen and upper pelvis. Possibility of phlegmonous tissue and or abscess should be considered.Thick-walled urinary bladder.  Differential considerations may include cystitis, neoplasia, adjacent inflammatory condition, history of radiation therapy, or other etiology.  Associated right moderate hydroureteronephrosis. Multiple foci of nondependent air within the urinary bladder.  The possibility of fistulous communication cannot be entirely excluded.  Other causes of air in the urinary bladder include instrumentation with a Garcia catheter and gas-forming organism."    He is s/p cysto with stent placement with Dr. Streeter on 1/22/21. 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 The patient then underwent IR placement of perc drain for this fluid collection on 1/27.      Follow up CT cystogram in IR on 3/12/21 showed "no further evidence of bladder injury.  Previously identified abdominal fluid collection resolved."  Perc drain removed in IR. He was lost to follow up.     He returned to the clinic on 10/21/21 again with frequent urinary infections and slow urinary stream.    S/p cysto with urethral dilation and retrograde pyelogram/stent exchange with Dr. Adams on 11/2/21. Successful VT on 11/10/21. He was advised to self catheterize once daily " "for dilation and for complete bladder emptying.    He "no showed" scheduled visits on 11/17/22 and 2/3/22 that were set up to discuss stent management.    Voiding per indwelling garcia- changed monthly by home health. He is aware that he has a ureteral stent in place- but states that he "just couldn't get in to the office." Denies flank pain and fever/chills.     The following portions of the patient's history were reviewed and updated as appropriate: allergies, current medications, past family history, past medical history, past social history, past surgical history and problem list.    Review of Systems  Constitutional: no fever or chills  ENT: no nasal congestion or sore throat  Respiratory: no cough or shortness of breath  Cardiovascular: no chest pain or palpitations  Gastrointestinal: no nausea or vomiting, tolerating diet  Genitourinary: as per HPI  Hematologic/Lymphatic: no easy bruising or lymphadenopathy  Musculoskeletal: no arthralgias or myalgias  Neurological: no seizures or tremors  Behavioral/Psych: no auditory or visual hallucinations     Objective:   Vitals:   Vitals:    07/18/23 0917   BP: (!) 106/57   Pulse: 70     Physical Exam   General: alert and oriented, no acute distress  Head: normocephalic, atraumatic  Neck: supple, normal ROM  Respiratory: Symmetric expansion, non-labored breathing  Cardiovascular: regular rate and rhythm  Abdomen: soft, non tender, non distended  Genitourinary: garcia to gravity with clear, danie urine  Skin: normal coloration and turgor, no rashes, no suspicious skin lesions noted  Neuro: alert and oriented x3,WC bound  Psych: normal judgment and insight, normal mood/affect, and non-anxious    Lab Review   Urinalysis demonstrates : no sample   Lab Results   Component Value Date    WBC 20.47 (H) 07/06/2023    HGB 10.7 (L) 07/06/2023    HCT 34.1 (L) 07/06/2023    MCV 77 (L) 07/06/2023     (H) 07/06/2023     Lab Results   Component Value Date    CREATININE 1.3 " 07/06/2023    BUN 22 07/06/2023     Lab Results   Component Value Date    PSA 1.4 07/06/2023     Imaging  None    Assessment:     1. Retained ureteral stent    2. Solitary kidney, acquired    3. Hydronephrosis, unspecified hydronephrosis type    4. Frequent UTI      Plan:   Maciel was seen today for follow up stent.    Diagnoses and all orders for this visit:    Retained ureteral stent  -     CT Renal Stone Study ABD Pelvis WO; Future  -     Urine culture    Solitary kidney, acquired    Hydronephrosis, unspecified hydronephrosis type    Frequent UTI    Plan:  --CT renal stone study to evaluate stent for placement/calcifications. Suspect severe encrustation.   --Urine culture today  --Will proceed with cysto with R retrograde pyelogram +/- ureteral stent exchange +/- ureteroscopy with LL pending CT results

## 2023-07-18 NOTE — PROGRESS NOTES
"Subjective:      Maciel Contreras is a 68 y.o. male who returns today regarding his ureteral stent.    The patient has an extensive medical history.      He has a neurogenic bladder and solitary right kidney 2/2 SCI from [facility] many years ago.  His bladder is managed with an indwelling garcia catheter- changed monthly by home health (last exchanged yesterday).    He developed a resistant/persistent UTIs in 2021. In the ED his creatinine was noted to be 4.4 and WBC was 35,000. CT showed "Small pneumoperitoneum.  Fluid and air bubbles tracking in the anterior of the abdomen and upper pelvis. Possibility of phlegmonous tissue and or abscess should be considered.Thick-walled urinary bladder.  Differential considerations may include cystitis, neoplasia, adjacent inflammatory condition, history of radiation therapy, or other etiology.  Associated right moderate hydroureteronephrosis. Multiple foci of nondependent air within the urinary bladder.  The possibility of fistulous communication cannot be entirely excluded.  Other causes of air in the urinary bladder include instrumentation with a Garcia catheter and gas-forming organism."    He is s/p cysto with stent placement with Dr. Streeter on 1/22/21. 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 The patient then underwent IR placement of perc drain for this fluid collection on 1/27.      Follow up CT cystogram in IR on 3/12/21 showed "no further evidence of bladder injury.  Previously identified abdominal fluid collection resolved."  Perc drain removed in IR. He was lost to follow up.     He returned to the clinic on 10/21/21 again with frequent urinary infections and slow urinary stream.    S/p cysto with urethral dilation and retrograde pyelogram/stent exchange with Dr. Adams on 11/2/21. Successful VT on 11/10/21. He was advised to self catheterize once daily " "for dilation and for complete bladder emptying.    He "no showed" scheduled visits on 11/17/22 and 2/3/22 that were set up to discuss stent management.    Voiding per indwelling garcia- changed monthly by home health. He is aware that he has a ureteral stent in place- but states that he "just couldn't get in to the office." Denies flank pain and fever/chills.     The following portions of the patient's history were reviewed and updated as appropriate: allergies, current medications, past family history, past medical history, past social history, past surgical history and problem list.    Review of Systems  Constitutional: no fever or chills  ENT: no nasal congestion or sore throat  Respiratory: no cough or shortness of breath  Cardiovascular: no chest pain or palpitations  Gastrointestinal: no nausea or vomiting, tolerating diet  Genitourinary: as per HPI  Hematologic/Lymphatic: no easy bruising or lymphadenopathy  Musculoskeletal: no arthralgias or myalgias  Neurological: no seizures or tremors  Behavioral/Psych: no auditory or visual hallucinations     Objective:   Vitals:   Vitals:    07/18/23 0917   BP: (!) 106/57   Pulse: 70     Physical Exam   General: alert and oriented, no acute distress  Head: normocephalic, atraumatic  Neck: supple, normal ROM  Respiratory: Symmetric expansion, non-labored breathing  Cardiovascular: regular rate and rhythm  Abdomen: soft, non tender, non distended  Genitourinary: garcia to gravity with clear, danie urine  Skin: normal coloration and turgor, no rashes, no suspicious skin lesions noted  Neuro: alert and oriented x3,WC bound  Psych: normal judgment and insight, normal mood/affect, and non-anxious    Lab Review   Urinalysis demonstrates : no sample   Lab Results   Component Value Date    WBC 20.47 (H) 07/06/2023    HGB 10.7 (L) 07/06/2023    HCT 34.1 (L) 07/06/2023    MCV 77 (L) 07/06/2023     (H) 07/06/2023     Lab Results   Component Value Date    CREATININE 1.3 " 07/06/2023    BUN 22 07/06/2023     Lab Results   Component Value Date    PSA 1.4 07/06/2023     Imaging  None    Assessment:     1. Retained ureteral stent    2. Solitary kidney, acquired    3. Hydronephrosis, unspecified hydronephrosis type    4. Frequent UTI      Plan:   Maciel was seen today for follow up stent.    Diagnoses and all orders for this visit:    Retained ureteral stent  -     CT Renal Stone Study ABD Pelvis WO; Future  -     Urine culture    Solitary kidney, acquired    Hydronephrosis, unspecified hydronephrosis type    Frequent UTI    Plan:  --CT renal stone study to evaluate stent for placement/calcifications. Suspect severe encrustation.   --Urine culture today  --Will proceed with cysto with R retrograde pyelogram +/- ureteral stent exchange +/- ureteroscopy with LL pending CT results

## 2023-07-18 NOTE — H&P (VIEW-ONLY)
"Subjective:      Maciel Contreras is a 68 y.o. male who returns today regarding his ureteral stent.    The patient has an extensive medical history.      He has a neurogenic bladder and solitary right kidney 2/2 SCI from [facility] many years ago.  His bladder is managed with an indwelling garcia catheter- changed monthly by home health (last exchanged yesterday).    He developed a resistant/persistent UTIs in 2021. In the ED his creatinine was noted to be 4.4 and WBC was 35,000. CT showed "Small pneumoperitoneum.  Fluid and air bubbles tracking in the anterior of the abdomen and upper pelvis. Possibility of phlegmonous tissue and or abscess should be considered.Thick-walled urinary bladder.  Differential considerations may include cystitis, neoplasia, adjacent inflammatory condition, history of radiation therapy, or other etiology.  Associated right moderate hydroureteronephrosis. Multiple foci of nondependent air within the urinary bladder.  The possibility of fistulous communication cannot be entirely excluded.  Other causes of air in the urinary bladder include instrumentation with a Garcia catheter and gas-forming organism."    He is s/p cysto with stent placement with Dr. Streeter on 1/22/21. 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 The patient then underwent IR placement of perc drain for this fluid collection on 1/27.      Follow up CT cystogram in IR on 3/12/21 showed "no further evidence of bladder injury.  Previously identified abdominal fluid collection resolved."  Perc drain removed in IR. He was lost to follow up.     He returned to the clinic on 10/21/21 again with frequent urinary infections and slow urinary stream.    S/p cysto with urethral dilation and retrograde pyelogram/stent exchange with Dr. Adams on 11/2/21. Successful VT on 11/10/21. He was advised to self catheterize once daily " "for dilation and for complete bladder emptying.    He "no showed" scheduled visits on 11/17/22 and 2/3/22 that were set up to discuss stent management.    Voiding per indwelling garcia- changed monthly by home health. He is aware that he has a ureteral stent in place- but states that he "just couldn't get in to the office." Denies flank pain and fever/chills.     The following portions of the patient's history were reviewed and updated as appropriate: allergies, current medications, past family history, past medical history, past social history, past surgical history and problem list.    Review of Systems  Constitutional: no fever or chills  ENT: no nasal congestion or sore throat  Respiratory: no cough or shortness of breath  Cardiovascular: no chest pain or palpitations  Gastrointestinal: no nausea or vomiting, tolerating diet  Genitourinary: as per HPI  Hematologic/Lymphatic: no easy bruising or lymphadenopathy  Musculoskeletal: no arthralgias or myalgias  Neurological: no seizures or tremors  Behavioral/Psych: no auditory or visual hallucinations     Objective:   Vitals:   Vitals:    07/18/23 0917   BP: (!) 106/57   Pulse: 70     Physical Exam   General: alert and oriented, no acute distress  Head: normocephalic, atraumatic  Neck: supple, normal ROM  Respiratory: Symmetric expansion, non-labored breathing  Cardiovascular: regular rate and rhythm  Abdomen: soft, non tender, non distended  Genitourinary: garcia to gravity with clear, danie urine  Skin: normal coloration and turgor, no rashes, no suspicious skin lesions noted  Neuro: alert and oriented x3,WC bound  Psych: normal judgment and insight, normal mood/affect, and non-anxious    Lab Review   Urinalysis demonstrates : no sample   Lab Results   Component Value Date    WBC 20.47 (H) 07/06/2023    HGB 10.7 (L) 07/06/2023    HCT 34.1 (L) 07/06/2023    MCV 77 (L) 07/06/2023     (H) 07/06/2023     Lab Results   Component Value Date    CREATININE 1.3 " 07/06/2023    BUN 22 07/06/2023     Lab Results   Component Value Date    PSA 1.4 07/06/2023     Imaging  None    Assessment:     1. Retained ureteral stent    2. Solitary kidney, acquired    3. Hydronephrosis, unspecified hydronephrosis type    4. Frequent UTI      Plan:   Maciel was seen today for follow up stent.    Diagnoses and all orders for this visit:    Retained ureteral stent  -     CT Renal Stone Study ABD Pelvis WO; Future  -     Urine culture    Solitary kidney, acquired    Hydronephrosis, unspecified hydronephrosis type    Frequent UTI    Plan:  --CT renal stone study to evaluate stent for placement/calcifications. Suspect severe encrustation.   --Urine culture today  --Will proceed with cysto with R retrograde pyelogram +/- ureteral stent exchange +/- ureteroscopy with LL pending CT results

## 2023-07-31 NOTE — PRE-PROCEDURE INSTRUCTIONS
Pre admit phone call completed.    Instructions given to patient about NPO status as follows:     The evening before surgery do not eat anything after 9 p.m. ( this includes hard candy, chewing gum and mints).  You may only have GATORADE, POWERADE AND WATER from 9 p.m. until you leave your home. DO NOT  DRINK ANY LIQUIDS ON THE WAY TO THE HOSPITAL.      Patient was also instructed on the below information:    Park in the Parking lot behind the hospital or in the [employer] [facility] across the street from the parking lot.  Parking is complimentary.  If you will be discharged the same day as your procedure, please arrange for a responsible adult to drive you home or  to accompany you if traveling by taxi.  YOU WILL NOT BE PERMITTED TO DRIVE OR TO LEAVE THE HOSPITAL ALONE AFTER SURGERY.  It is strongly recommended that you arrange for someone to remain with you for the first 24 hrs following your surgery.    Patient verbalized understanding of above instructions.

## 2023-08-01 NOTE — BRIEF OP NOTE
[facility] - Surgery ([place])  Brief Operative Note    Surgery Date: 8/1/2023     Surgeon(s) and Role:     * Juan Adams MD - Primary    Assisting Surgeon: None    Pre-op Diagnosis:  Retained ureteral stent [Z96.0]  Solitary kidney, acquired [Z90.5]  Hydronephrosis, unspecified hydronephrosis type [N13.30]    Post-op Diagnosis:  Post-Op Diagnosis Codes:     * Retained ureteral stent [Z96.0]     * Solitary kidney, acquired [Z90.5]     * Hydronephrosis, unspecified hydronephrosis type [N13.30]    Procedure(s) (LRB):  CYSTOSCOPY, WITH RETROGRADE PYELOGRAM AND URETERAL STENT INSERTION (Right)  CYSTOSCOPY, WITH URETERAL STENT REMOVAL (Right)  CYSTOURETEROSCOPY,WITH HOLMIUM LASER LITHOTRIPSY OF URETERAL CALCULUS and basketing (Right)    Anesthesia: General    Operative Findings:   Mild right sided hydroureter  Brisk efflux of contrast after stent removal    Estimated Blood Loss: * No values recorded between 8/1/2023 10:50 AM and 8/1/2023 11:06 AM *         Specimens:   Specimen (24h ago, onward)      None              Discharge Note    OUTCOME: Patient tolerated treatment/procedure well without complication and is now ready for discharge.    DISPOSITION: Home or Self Care    FINAL DIAGNOSIS:  Ureteral stent retained    FOLLOWUP: In clinic    DISCHARGE INSTRUCTIONS:    Discharge Procedure Orders   US Retroperitoneal Complete   Standing Status: Future Standing Exp. Date: 08/01/24     Order Specific Question Answer Comments   May the Radiologist modify the order per protocol to meet the clinical needs of the patient? Yes    Release to patient Immediate      BASIC METABOLIC PANEL   Standing Status: Future Standing Exp. Date: 09/29/24     Notify your health care provider if you experience any of the following:  temperature >100.4     Notify your health care provider if you experience any of the following:  persistent nausea and vomiting or diarrhea     Notify your health care provider if you experience any of the  following:  severe uncontrolled pain     Notify your health care provider if you experience any of the following:  difficulty breathing or increased cough     Notify your health care provider if you experience any of the following:  severe persistent headache     Notify your health care provider if you experience any of the following:  persistent dizziness, light-headedness, or visual disturbances     Notify your health care provider if you experience any of the following:  increased confusion or weakness

## 2023-08-01 NOTE — PATIENT INSTRUCTIONS
Post Cystoscopy Instructions  Do not strain to have a bowel movement  No strenuous exercise x 7 days  No driving while you are on narcotic pain medications or if your garcia  catheter is in place    You can expect:  To pass stone fragments if you had a stone procedure  Have pain when you void from your stent if you have a stent in place  See blood in your urine if you have a stent in place    If you have a catheter, please return to the ER if your catheter stops draining or you are having abdominal pain.    Call the doctor if:  Temperature is greater than 101F  Persistent vomiting and inability to keep food down  Inability to void if you do not have a catheter

## 2023-08-01 NOTE — ANESTHESIA POSTPROCEDURE EVALUATION
Anesthesia Post Evaluation    Patient: Maciel Contreras    Procedure(s) Performed: Procedure(s) (LRB):  CYSTOSCOPY, WITH URETERAL STENT REMOVAL (Right)  CYSTOSCOPY, WITH RETROGRADE PYELOGRAM (Right)  CYSTOURETHROSCOPY (Right)    Final Anesthesia Type: general      Patient location during evaluation: PACU  Patient participation: Yes- Able to Participate  Level of consciousness: awake and alert  Post-procedure vital signs: reviewed and stable  Pain management: adequate  Airway patency: patent    PONV status at discharge: No PONV  Anesthetic complications: no      Cardiovascular status: blood pressure returned to baseline  Respiratory status: unassisted, spontaneous ventilation and room air  Hydration status: euvolemic  Follow-up not needed.          Vitals Value Taken Time   /65 08/01/23 1200   Temp 36.6 °C (97.9 °F) 08/01/23 1200   Pulse 71 08/01/23 1200   Resp 18 08/01/23 1200   SpO2 99 % 08/01/23 1200         Event Time   Out of Recovery 11:52:35         Pain/Eduar Score: Pain Rating Prior to Med Admin: 0 (8/1/2023 11:43 AM)  Edaur Score: 10 (8/1/2023 12:00 PM)

## 2023-08-01 NOTE — TRANSFER OF CARE
"Anesthesia Transfer of Care Note    Patient: Maciel Contreras    Procedure(s) Performed: Procedure(s) (LRB):  CYSTOSCOPY, WITH RETROGRADE PYELOGRAM AND URETERAL STENT INSERTION (Right)  CYSTOSCOPY, WITH URETERAL STENT REMOVAL (Right)  CYSTOURETEROSCOPY,WITH HOLMIUM LASER LITHOTRIPSY OF URETERAL CALCULUS and basketing (Right)    Patient location: PACU    Anesthesia Type: general    Transport from OR: Transported from OR on 6-10 L/min O2 by face mask with adequate spontaneous ventilation    Post pain: adequate analgesia    Post assessment: no apparent anesthetic complications and tolerated procedure well    Post vital signs: stable    Level of consciousness: awake, oriented and alert    Nausea/Vomiting: no nausea/vomiting    Complications: none    Transfer of care protocol was followed      Last vitals:   Visit Vitals  /63   Pulse 79   Temp 37.2 °C (99 °F) (Oral)   Resp 16   Ht 6' 1" (1.854 m)   Wt 86.2 kg (190 lb)   SpO2 100%   BMI 25.07 kg/m²     "

## 2023-08-01 NOTE — ANESTHESIA PREPROCEDURE EVALUATION
08/01/2023  Maciel Contreras is a 68 y.o., male.      Pre-op Assessment    I have reviewed the Patient Summary Reports.     I have reviewed the Nursing Notes. I have reviewed the NPO Status.   I have reviewed the Medications.     Review of Systems  Anesthesia Hx:  No problems with previous Anesthesia  Denies Family Hx of Anesthesia complications.   Denies Personal Hx of Anesthesia complications.   Social:  Smoker 1/4 ppd   Hematology/Oncology:     Oncology Normal    -- Anemia:   EENT/Dental:EENT/Dental Normal   Cardiovascular:   Exercise tolerance: good Hypertension    Pulmonary:  Pulmonary Normal    Renal/:   Chronic Renal Disease, CKD    Musculoskeletal:  Musculoskeletal Normal    Neurological:  Neurology Normal Paraplegic from [facility]   Endocrine:  Endocrine Normal    Dermatological:  Skin Normal    Psych:   anxiety depression          Physical Exam  General: Well nourished, Cooperative, Oriented and Alert    Airway:  Mouth Opening: Normal  TM Distance: Normal  Neck ROM: Normal ROM    Dental:  Intact, Partial Dentures        Anesthesia Plan  Type of Anesthesia, risks & benefits discussed:    Anesthesia Type: Gen Supraglottic Airway  Intra-op Monitoring Plan: Standard ASA Monitors  Post Op Pain Control Plan: multimodal analgesia  Induction:  IV  Airway Plan: Video and Direct  Informed Consent: Informed consent signed with the Patient and all parties understand the risks and agree with anesthesia plan.  All questions answered.   ASA Score: 3  Day of Surgery Review of History & Physical: H&P Update referred to the surgeon/provider.  Anesthesia Plan Notes: Labs ok. LMA    Ready For Surgery From Anesthesia Perspective.     .

## 2023-08-01 NOTE — ANESTHESIA PROCEDURE NOTES
Intubation    Date/Time: 8/1/2023 10:38 AM    Performed by: Shivani Gaxiola CRNA  Authorized by: Adama Diez MD    Intubation:     Induction:  Intravenous    Intubated:  Postinduction    Mask Ventilation:  Easy mask    Attempts:  1    Attempted By:  CRNA    Difficult Airway Encountered?: No      Complications:  None    Airway Device:  Supraglottic airway/LMA    Airway Device Size:  5.0    Secured at:  The lips    Placement Verified By:  Capnometry    Complicating Factors:  None    Findings Post-Intubation:  BS equal bilateral and atraumatic/condition of teeth unchanged

## 2023-08-01 NOTE — PLAN OF CARE
Maciel Contreras has met all discharge criteria from Phase II. Vital Signs are stable. Discharge instructions given, patient verbalized understanding. Discharged from facility via wheelchair in stable condition.

## 2023-08-01 NOTE — OP NOTE
[facility]  Operative Note    Date: 08/01/2023    Pre-Op Diagnosis:   Right hydronephrosis, retained right ureteral stent    Post-Op Diagnosis: same    Procedure(s) Performed:   1.  Cystoscopy with Right JJ ureteral stent removal  2.  Right retrograde pyelogram  Fluoro < 1 h    Specimen(s): None    Staff Surgeon: Juan Adams MD    Assistant Surgeon: Timur Guthrie MD    Anesthesia: General LMA anesthesia    Indications: Maciel Contreras is a 68 y.o. male with Right hydronephrosis, currently with a 6 x 28 Right double J ureteral stent.     Findings:   Right ureteral stent removed without issue, no encrustation  Right retrograde pyelogram with mild hydroureter  Brisk efflux of contrast from right ureteral orifice on stent removal  Decision made not to replace stent    Estimated Blood Loss: min    Drains:   1.  16 Fr garcia catheter    Procedure in Detail:  After risks, benefits and possible complications of the procedure were explained, the patient elected to undergo the procedure and informed consent was obtained.  All questions were answered in the adama-operative area. The patient was transferred to the cystoscopy suite and placed on the fluoroscopy table in the supine position.  SCDs were applied and working. Time out was performed, adama-procedural antibiotics were given. Anesthesia was administered.  After adequate anesthesia the patient was placed in dorsal lithotomy position and prepped and draped in the usual sterile fashion.     A rigid cystoscope in a 22 Fr sheath was introduced into the patients bladder per urethra.  This passed easily.  The entire urethra was visualized and revealed no strictures or masses.  Formal cystoscopy was performed which showed the right and left ureteral orifices in the normal anatomic position effluxing clear urine.  There were no masses or lesions seen, no bladder stones, no diverticuli and no trabeculations.      Our attention was turned to the patients right  ureteral orifice which had a stent in place.  A motion was advanced up the right ureteral orifice alongside the stent to the level of the expected renal pelvis. This was confirmed using fluoro.  We then used graspers to remove the stent.  This was done under continuous fluoro to ensure the wire stayed in place.  We inspected the stent and it was found to be intact.  We then passed a 5 Fr ureteral catheter over the wire into the distal ureter. Contrast was instilled which showed normal proximal ureter and renal pelvis with mild hydroureter at about the level of the mid and distal ureter. The 5 Fr was removed and brisk efflux was noted from the right ureteral orifice. We opted not to replace the stent given these findings.    The patient's bladder was drained and the procedure was terminated.  The patient tolerated the procedure well and was transferred to the recovery room in stable condition.  16 Fr garcia catheter was replaced.    Follow up care: The patient will follow up with Stephanie Bennett in 1 week with a BMP and renal ultrasound prior.    Timur Guthrie MD    Attestation:  I have reviewed the notes, assessments, and/or procedures documented by Dr. Guthrie and I concur with her/his documentation of Maciel Contreras.     I was present for the entire procedure.    Judson Adams MD

## 2023-08-23 NOTE — PROGRESS NOTES
"Subjective:      Maciel Contreras is a 68 y.o. male who returns today regarding his ureteral stent.    The patient has an extensive medical history.      He has a neurogenic bladder and solitary right kidney 2/2 SCI from [facility] many years ago.  His bladder is managed with an indwelling garcia catheter- changed monthly by home health (last exchanged yesterday).     He developed a resistant/persistent UTIs in 2021. In the ED his creatinine was noted to be 4.4 and WBC was 35,000. CT showed "Small pneumoperitoneum.  Fluid and air bubbles tracking in the anterior of the abdomen and upper pelvis. Possibility of phlegmonous tissue and or abscess should be considered.Thick-walled urinary bladder.  Differential considerations may include cystitis, neoplasia, adjacent inflammatory condition, history of radiation therapy, or other etiology.  Associated right moderate hydroureteronephrosis. Multiple foci of nondependent air within the urinary bladder.  The possibility of fistulous communication cannot be entirely excluded.  Other causes of air in the urinary bladder include instrumentation with a Garcia catheter and gas-forming organism."     He is s/p cysto with stent placement with Dr. Streeter on 1/22/21. 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 The patient then underwent IR placement of perc drain for this fluid collection on 1/27.      Follow up CT cystogram in IR on 3/12/21 showed "no further evidence of bladder injury.  Previously identified abdominal fluid collection resolved."  Perc drain removed in IR. He was lost to follow up.     He returned to the clinic on 10/21/21 again with frequent urinary infections and slow urinary stream.     S/p cysto with urethral dilation and retrograde pyelogram/stent exchange with Dr. Adams on 11/2/21. Successful VT on 11/10/21. He was advised to self catheterize once " "daily for dilation and for complete bladder emptying.     He "no showed" scheduled visits on 11/17/22 and 2/3/22 that were set up to discuss stent management.    Returned to the clinic in July. He is now s/p cysto with R ureteral stent removal with Dr. Adams on 8/1/23.   "Right retrograde pyelogram with mild hydroureter  Brisk efflux of contrast from right ureteral orifice on stent removal  Decision made not to replace stent"     Returns today with BERT and BMP.     Doing well. Denies fever/chills and flank pain.     The following portions of the patient's history were reviewed and updated as appropriate: allergies, current medications, past family history, past medical history, past social history, past surgical history and problem list.    Review of Systems  Constitutional: no fever or chills  ENT: no nasal congestion or sore throat  Respiratory: no cough or shortness of breath  Cardiovascular: no chest pain or palpitations  Gastrointestinal: no nausea or vomiting, tolerating diet  Genitourinary: as per HPI  Hematologic/Lymphatic: no easy bruising or lymphadenopathy  Musculoskeletal: no arthralgias or myalgias  Neurological: no seizures or tremors  Behavioral/Psych: no auditory or visual hallucinations     Objective:   Vitals: BP (!) 107/57 (BP Location: Left arm, Patient Position: Sitting, BP Method: Large (Automatic))   Pulse 66   Ht 6' 1" (1.854 m)   Wt 86.2 kg (190 lb 0.6 oz)   SpO2 100%   BMI 25.07 kg/m²     Physical Exam   General: alert and oriented, no acute distress  Head: normocephalic, atraumatic  Neck: supple, normal ROM  Respiratory: Symmetric expansion, non-labored breathing  Cardiovascular: regular rate and rhythm  Abdomen: soft, non tender, non distended  Genitourinary: garcia to gravity   Skin: normal coloration and turgor, no rashes, no suspicious skin lesions noted  Neuro: alert and oriented x3, WC bound  Psych: normal judgment and insight, normal mood/affect, and non-anxious    Lab Review " "  Urinalysis demonstrates: no sample, garcia  Lab Results   Component Value Date    WBC 20.47 (H) 07/06/2023    HGB 10.7 (L) 07/06/2023    HCT 34.1 (L) 07/06/2023    MCV 77 (L) 07/06/2023     (H) 07/06/2023     Lab Results   Component Value Date    CREATININE 1.4 08/11/2023    BUN 20 08/11/2023     Lab Results   Component Value Date    PSA 1.4 07/06/2023     Imaging   (all images personally reviewed; agree with report below)R  BERT- "Right kidney: The right kidney measures 12.2 cm. No cortical thinning. No loss of corticomedullary distinction. Resistive index measures 0.71.  Simple cyst measures 1.8 cm.  No renal stone. No hydronephrosis.  Left kidney: Surgically absent.  The bladder is partially distended at the time of scanning and has an unremarkable appearance.  Impression:  Prior left nephrectomy.  Simple right renal cyst with no acute process seen"    Assessment:     1. Neurogenic bladder    2. Solitary kidney, acquired      Plan:   Maciel was seen today for follow-up.    Diagnoses and all orders for this visit:    Neurogenic bladder    Solitary kidney, acquired    Plan:  --Creatinine remains at baseline and no hydro noted on US  --Maintain garcia with exchanges monthly per home health  --Follow up in 6 months   "

## 2023-12-01 NOTE — TELEPHONE ENCOUNTER
Spoke with [facility]. It is not advisable to increase the size of the catheter to prevent leakage. Rx for ditropan to help with the spasms/leakage.    ----- Message from Marisabel Man MA sent at 12/1/2023  1:35 PM CST -----  Please Advise.    Thanks  Marisabel  ----- Message -----  From: Chantal Kohli  Sent: 12/1/2023   1:29 PM CST  To: [facility] Staff    Name of Who is Calling: [facility] Nurse on behalf of SUSANA BURNETT [[id number]]              What is the request in detail: Nurse requesting a call back to discuss catheter leaking and wanted to see if he needed to change to a bigger size.               Can the clinic reply by [facility]: No              What Number to Call Back if not in [facility]: [phone]

## 2024-01-01 ENCOUNTER — TELEPHONE (OUTPATIENT)
Dept: UROLOGY | Facility: CLINIC | Age: 69
End: 2024-01-01
Payer: MEDICARE

## 2024-01-01 ENCOUNTER — PATIENT MESSAGE (OUTPATIENT)
Dept: UROLOGY | Facility: CLINIC | Age: 69
End: 2024-01-01

## 2024-01-01 ENCOUNTER — TELEPHONE (OUTPATIENT)
Dept: WOUND CARE | Facility: HOSPITAL | Age: 69
End: 2024-01-01
Payer: MEDICARE

## 2024-01-01 ENCOUNTER — OFFICE VISIT (OUTPATIENT)
Dept: UROLOGY | Facility: CLINIC | Age: 69
End: 2024-01-01
Payer: MEDICARE

## 2024-01-01 ENCOUNTER — HOSPITAL ENCOUNTER (INPATIENT)
Facility: HOSPITAL | Age: 69
LOS: 1 days | DRG: 698 | End: 2024-02-29
Attending: EMERGENCY MEDICINE | Admitting: INTERNAL MEDICINE
Payer: MEDICARE

## 2024-01-01 ENCOUNTER — PATIENT MESSAGE (OUTPATIENT)
Dept: UROLOGY | Facility: CLINIC | Age: 69
End: 2024-01-01
Payer: MEDICARE

## 2024-01-01 VITALS
WEIGHT: 190.06 LBS | OXYGEN SATURATION: 99 % | DIASTOLIC BLOOD PRESSURE: 73 MMHG | RESPIRATION RATE: 16 BRPM | SYSTOLIC BLOOD PRESSURE: 136 MMHG | HEIGHT: 73 IN | BODY MASS INDEX: 25.19 KG/M2 | HEART RATE: 62 BPM

## 2024-01-01 DIAGNOSIS — B96.1 BACTEREMIA DUE TO KLEBSIELLA PNEUMONIAE: ICD-10-CM

## 2024-01-01 DIAGNOSIS — D65 DIC (DISSEMINATED INTRAVASCULAR COAGULATION): ICD-10-CM

## 2024-01-01 DIAGNOSIS — T83.511A URINARY TRACT INFECTION ASSOCIATED WITH INDWELLING URETHRAL CATHETER, INITIAL ENCOUNTER: ICD-10-CM

## 2024-01-01 DIAGNOSIS — R57.9 SHOCK: ICD-10-CM

## 2024-01-01 DIAGNOSIS — N17.0 ATN (ACUTE TUBULAR NECROSIS): ICD-10-CM

## 2024-01-01 DIAGNOSIS — R65.21 SEPTIC SHOCK: ICD-10-CM

## 2024-01-01 DIAGNOSIS — A41.9 SEPTIC SHOCK: ICD-10-CM

## 2024-01-01 DIAGNOSIS — E16.2 HYPOGLYCEMIA: ICD-10-CM

## 2024-01-01 DIAGNOSIS — N32.81 OAB (OVERACTIVE BLADDER): Primary | ICD-10-CM

## 2024-01-01 DIAGNOSIS — R78.81 BACTEREMIA DUE TO KLEBSIELLA PNEUMONIAE: ICD-10-CM

## 2024-01-01 DIAGNOSIS — L89.303 PRESSURE INJURY OF BUTTOCK, STAGE 3, UNSPECIFIED LATERALITY: ICD-10-CM

## 2024-01-01 DIAGNOSIS — R31.9 HEMATURIA, UNSPECIFIED TYPE: ICD-10-CM

## 2024-01-01 DIAGNOSIS — G93.40 ACUTE ENCEPHALOPATHY: ICD-10-CM

## 2024-01-01 DIAGNOSIS — N39.0 URINARY TRACT INFECTION ASSOCIATED WITH INDWELLING URETHRAL CATHETER, INITIAL ENCOUNTER: ICD-10-CM

## 2024-01-01 DIAGNOSIS — Z90.5 SOLITARY KIDNEY, ACQUIRED: ICD-10-CM

## 2024-01-01 DIAGNOSIS — R57.0 CARDIOGENIC SHOCK: ICD-10-CM

## 2024-01-01 DIAGNOSIS — G82.20 PARAPLEGIA: ICD-10-CM

## 2024-01-01 DIAGNOSIS — A41.9 SEPSIS, DUE TO UNSPECIFIED ORGANISM, UNSPECIFIED WHETHER ACUTE ORGAN DYSFUNCTION PRESENT: ICD-10-CM

## 2024-01-01 DIAGNOSIS — R07.9 CHEST PAIN: ICD-10-CM

## 2024-01-01 DIAGNOSIS — I95.9 HYPOTENSION: Primary | ICD-10-CM

## 2024-01-01 DIAGNOSIS — L89.893 PRESSURE INJURY OF FOOT, STAGE 3, UNSPECIFIED LATERALITY: ICD-10-CM

## 2024-01-01 DIAGNOSIS — I50.21 ACUTE SYSTOLIC HEART FAILURE: ICD-10-CM

## 2024-01-01 DIAGNOSIS — I73.9 PERIPHERAL VASCULAR DISEASE: ICD-10-CM

## 2024-01-01 DIAGNOSIS — N31.9 NEUROGENIC BLADDER: ICD-10-CM

## 2024-01-01 DIAGNOSIS — N32.81 OAB (OVERACTIVE BLADDER): ICD-10-CM

## 2024-01-01 DIAGNOSIS — N32.89 BLADDER SPASMS: Primary | ICD-10-CM

## 2024-01-01 LAB
ABO + RH BLD: NORMAL
ACINETOBACTER CALCOACETICUS/BAUMANNII COMPLEX: NOT DETECTED
ADENOVIRUS: NOT DETECTED
ALBUMIN SERPL BCP-MCNC: 2.1 G/DL (ref 3.5–5.2)
ALBUMIN SERPL BCP-MCNC: 2.1 G/DL (ref 3.5–5.2)
ALBUMIN SERPL BCP-MCNC: 2.5 G/DL (ref 3.5–5.2)
ALLENS TEST: ABNORMAL
ALP SERPL-CCNC: 162 U/L (ref 55–135)
ALP SERPL-CCNC: 95 U/L (ref 55–135)
ALP SERPL-CCNC: 97 U/L (ref 55–135)
ALT SERPL W/O P-5'-P-CCNC: 19 U/L (ref 10–44)
ALT SERPL W/O P-5'-P-CCNC: 22 U/L (ref 10–44)
ALT SERPL W/O P-5'-P-CCNC: 33 U/L (ref 10–44)
ANION GAP SERPL CALC-SCNC: 15 MMOL/L (ref 8–16)
ANION GAP SERPL CALC-SCNC: 18 MMOL/L (ref 8–16)
ANION GAP SERPL CALC-SCNC: 18 MMOL/L (ref 8–16)
ANION GAP SERPL CALC-SCNC: ABNORMAL MMOL/L (ref 8–16)
ANISOCYTOSIS BLD QL SMEAR: SLIGHT
APTT PPP: 100.5 SEC (ref 21–32)
APTT PPP: 56.7 SEC (ref 21–32)
APTT PPP: 63.8 SEC (ref 21–32)
APTT PPP: 68.7 SEC (ref 21–32)
AST SERPL-CCNC: 35 U/L (ref 10–40)
AST SERPL-CCNC: 54 U/L (ref 10–40)
AST SERPL-CCNC: 75 U/L (ref 10–40)
BACTEROIDES FRAGILIS: NOT DETECTED
BASOPHILS # BLD AUTO: 0.12 K/UL (ref 0–0.2)
BASOPHILS # BLD AUTO: ABNORMAL K/UL (ref 0–0.2)
BASOPHILS # BLD AUTO: ABNORMAL K/UL (ref 0–0.2)
BASOPHILS NFR BLD: 0 % (ref 0–1.9)
BASOPHILS NFR BLD: 0.4 % (ref 0–1.9)
BILIRUB SERPL-MCNC: 1 MG/DL (ref 0.1–1)
BILIRUB SERPL-MCNC: 1.4 MG/DL (ref 0.1–1)
BILIRUB SERPL-MCNC: 1.8 MG/DL (ref 0.1–1)
BLD GP AB SCN CELLS X3 SERPL QL: NORMAL
BLD PROD TYP BPU: NORMAL
BLOOD UNIT EXPIRATION DATE: NORMAL
BLOOD UNIT TYPE CODE: 5100
BLOOD UNIT TYPE: NORMAL
BORDETELLA PARAPERTUSSIS (IS1001): NOT DETECTED
BORDETELLA PERTUSSIS (PTXP): NOT DETECTED
BSA FOR ECHO PROCEDURE: 2.04 M2
BUN SERPL-MCNC: 30 MG/DL (ref 6–30)
BUN SERPL-MCNC: 33 MG/DL (ref 8–23)
BUN SERPL-MCNC: 33 MG/DL (ref 8–23)
BUN SERPL-MCNC: 34 MG/DL (ref 8–23)
BUN SERPL-MCNC: 35 MG/DL (ref 8–23)
BURR CELLS BLD QL SMEAR: ABNORMAL
CA-I BLDV-SCNC: 1.04 MMOL/L (ref 1.06–1.42)
CALCIUM SERPL-MCNC: 7.4 MG/DL (ref 8.7–10.5)
CALCIUM SERPL-MCNC: 7.5 MG/DL (ref 8.7–10.5)
CALCIUM SERPL-MCNC: 8.5 MG/DL (ref 8.7–10.5)
CALCIUM SERPL-MCNC: 8.8 MG/DL (ref 8.7–10.5)
CANDIDA ALBICANS: NOT DETECTED
CANDIDA AURIS: NOT DETECTED
CANDIDA GLABRATA: NOT DETECTED
CANDIDA KRUSEI: NOT DETECTED
CANDIDA PARAPSILOSIS: NOT DETECTED
CANDIDA TROPICALIS: NOT DETECTED
CHLAMYDIA PNEUMONIAE: NOT DETECTED
CHLORIDE SERPL-SCNC: 102 MMOL/L (ref 95–110)
CHLORIDE SERPL-SCNC: 103 MMOL/L (ref 95–110)
CHLORIDE SERPL-SCNC: 103 MMOL/L (ref 95–110)
CHLORIDE SERPL-SCNC: 104 MMOL/L (ref 95–110)
CHLORIDE SERPL-SCNC: 98 MMOL/L (ref 95–110)
CO2 SERPL-SCNC: 14 MMOL/L (ref 23–29)
CO2 SERPL-SCNC: 14 MMOL/L (ref 23–29)
CO2 SERPL-SCNC: 17 MMOL/L (ref 23–29)
CO2 SERPL-SCNC: <5 MMOL/L (ref 23–29)
CODING SYSTEM: NORMAL
CORONAVIRUS 229E, COMMON COLD VIRUS: NOT DETECTED
CORONAVIRUS HKU1, COMMON COLD VIRUS: NOT DETECTED
CORONAVIRUS NL63, COMMON COLD VIRUS: NOT DETECTED
CORONAVIRUS OC43, COMMON COLD VIRUS: NOT DETECTED
CREAT SERPL-MCNC: 1.9 MG/DL (ref 0.5–1.4)
CREAT SERPL-MCNC: 2 MG/DL (ref 0.5–1.4)
CREAT SERPL-MCNC: 2.1 MG/DL (ref 0.5–1.4)
CREAT SERPL-MCNC: 2.3 MG/DL (ref 0.5–1.4)
CREAT SERPL-MCNC: 2.3 MG/DL (ref 0.5–1.4)
CROSSMATCH INTERPRETATION: NORMAL
CRYPTOCOCCUS NEOFORMANS/GATTII: NOT DETECTED
CTX-M GENE (ESBL PRODUCER): DETECTED
CV ECHO LV RWT: 0.4 CM
D DIMER PPP IA.FEU-MCNC: >33 MG/L FEU
DELSYS: ABNORMAL
DELSYS: ABNORMAL
DIFFERENTIAL METHOD BLD: ABNORMAL
DISPENSE STATUS: NORMAL
DOHLE BOD BLD QL SMEAR: PRESENT
E WAVE DECELERATION TIME: 91.6 MSEC
E/A RATIO: 2.78
E/E' RATIO: 13.64 M/S
ECHO LV POSTERIOR WALL: 0.83 CM (ref 0.6–1.1)
EJECTION FRACTION: 10 %
ENTEROBACTER CLOACAE COMPLEX: NOT DETECTED
ENTEROBACTERALES: ABNORMAL
ENTEROCOCCUS FAECALIS: NOT DETECTED
ENTEROCOCCUS FAECIUM: NOT DETECTED
EOSINOPHIL # BLD AUTO: 0 K/UL (ref 0–0.5)
EOSINOPHIL # BLD AUTO: ABNORMAL K/UL (ref 0–0.5)
EOSINOPHIL # BLD AUTO: ABNORMAL K/UL (ref 0–0.5)
EOSINOPHIL NFR BLD: 0 % (ref 0–8)
EOSINOPHIL NFR BLD: 0.1 % (ref 0–8)
ERYTHROCYTE [DISTWIDTH] IN BLOOD BY AUTOMATED COUNT: 21.3 % (ref 11.5–14.5)
ERYTHROCYTE [DISTWIDTH] IN BLOOD BY AUTOMATED COUNT: 21.4 % (ref 11.5–14.5)
ERYTHROCYTE [DISTWIDTH] IN BLOOD BY AUTOMATED COUNT: 21.6 % (ref 11.5–14.5)
ERYTHROCYTE [DISTWIDTH] IN BLOOD BY AUTOMATED COUNT: 21.7 % (ref 11.5–14.5)
ERYTHROCYTE [DISTWIDTH] IN BLOOD BY AUTOMATED COUNT: 21.9 % (ref 11.5–14.5)
ERYTHROCYTE [SEDIMENTATION RATE] IN BLOOD BY WESTERGREN METHOD: 24 MM/H
ERYTHROCYTE [SEDIMENTATION RATE] IN BLOOD BY WESTERGREN METHOD: 24 MM/H
ESCHERICHIA COLI: NOT DETECTED
EST. GFR  (NO RACE VARIABLE): 30.6 ML/MIN/1.73 M^2
EST. GFR  (NO RACE VARIABLE): 30.6 ML/MIN/1.73 M^2
EST. GFR  (NO RACE VARIABLE): 34.1 ML/MIN/1.73 M^2
EST. GFR  (NO RACE VARIABLE): 38.4 ML/MIN/1.73 M^2
FACT IX ACT/NOR PPP: 81 % (ref 65–145)
FACT V ACT/NOR PPP: 33 % (ref 60–145)
FACT VIII ACT/NOR PPP: 25 % (ref 60–170)
FACT XI ACT/NOR PPP: 74 % (ref 55–145)
FIBRINOGEN PPP-MCNC: 81 MG/DL (ref 182–400)
FIBRINOGEN PPP-MCNC: <70 MG/DL (ref 182–400)
FIBRINOGEN PPP-MCNC: <70 MG/DL (ref 182–400)
FIO2: 100
FIO2: 50
FLUBV RNA NPH QL NAA+NON-PROBE: NOT DETECTED
FRACTIONAL SHORTENING: 3 % (ref 28–44)
GIANT PLATELETS BLD QL SMEAR: PRESENT
GLUCOSE SERPL-MCNC: 158 MG/DL (ref 70–110)
GLUCOSE SERPL-MCNC: 174 MG/DL (ref 70–110)
GLUCOSE SERPL-MCNC: 31 MG/DL (ref 70–110)
GLUCOSE SERPL-MCNC: 47 MG/DL (ref 70–110)
GLUCOSE SERPL-MCNC: 50 MG/DL (ref 70–110)
HAEMOPHILUS INFLUENZAE: NOT DETECTED
HAPTOGLOB SERPL-MCNC: 152 MG/DL (ref 30–250)
HCO3 UR-SCNC: 18.1 MMOL/L (ref 24–28)
HCO3 UR-SCNC: 18.3 MMOL/L (ref 24–28)
HCO3 UR-SCNC: 7 MMOL/L (ref 24–28)
HCT VFR BLD AUTO: 26.5 % (ref 40–54)
HCT VFR BLD AUTO: 32.8 % (ref 40–54)
HCT VFR BLD AUTO: 35 % (ref 40–54)
HCT VFR BLD AUTO: 35.3 % (ref 40–54)
HCT VFR BLD AUTO: 35.5 % (ref 40–54)
HCT VFR BLD CALC: 26 %PCV (ref 36–54)
HCT VFR BLD CALC: 39 %PCV (ref 36–54)
HGB BLD-MCNC: 10.1 G/DL (ref 14–18)
HGB BLD-MCNC: 10.8 G/DL (ref 14–18)
HGB BLD-MCNC: 11.2 G/DL (ref 14–18)
HGB BLD-MCNC: 11.4 G/DL (ref 14–18)
HGB BLD-MCNC: 8.2 G/DL (ref 14–18)
HPIV1 RNA NPH QL NAA+NON-PROBE: NOT DETECTED
HPIV2 RNA NPH QL NAA+NON-PROBE: NOT DETECTED
HPIV3 RNA NPH QL NAA+NON-PROBE: NOT DETECTED
HPIV4 RNA NPH QL NAA+NON-PROBE: NOT DETECTED
HUMAN METAPNEUMOVIRUS: NOT DETECTED
HYPOCHROMIA BLD QL SMEAR: ABNORMAL
HYPOCHROMIA BLD QL SMEAR: ABNORMAL
IMM GRANULOCYTES # BLD AUTO: 0.78 K/UL (ref 0–0.04)
IMM GRANULOCYTES # BLD AUTO: ABNORMAL K/UL (ref 0–0.04)
IMM GRANULOCYTES NFR BLD AUTO: 2.9 % (ref 0–0.5)
IMM GRANULOCYTES NFR BLD AUTO: ABNORMAL % (ref 0–0.5)
IMP GENE (CARBAPENEM RESISTANT): NOT DETECTED
INFLUENZA A (SUBTYPES H1,H1-2009,H3): NOT DETECTED
INR PPP: 1.9 (ref 0.8–1.2)
INR PPP: 1.9 (ref 0.8–1.2)
INR PPP: 2 (ref 0.8–1.2)
INR PPP: >10 (ref 0.8–1.2)
INR PPP: >10 (ref 0.8–1.2)
INTERVENTRICULAR SEPTUM: 0.95 CM (ref 0.6–1.1)
KLEBSIELLA AEROGENES: NOT DETECTED
KLEBSIELLA OXYTOCA: NOT DETECTED
KLEBSIELLA PNEUMONIAE GROUP: DETECTED
KPC RESISTANCE GENE (CARBAPENEM): NOT DETECTED
LA MAJOR: 4.95 CM
LA MINOR: 4.99 CM
LA WIDTH: 3.49 CM
LACTATE SERPL-SCNC: 5.9 MMOL/L (ref 0.5–2.2)
LACTATE SERPL-SCNC: 6.4 MMOL/L (ref 0.5–2.2)
LACTATE SERPL-SCNC: >12 MMOL/L (ref 0.5–2.2)
LDH SERPL L TO P-CCNC: 329 U/L (ref 110–260)
LDH SERPL L TO P-CCNC: 7.61 MMOL/L (ref 0.5–2.2)
LEFT ATRIUM SIZE: 4.32 CM
LEFT ATRIUM VOLUME INDEX MOD: 29.3 ML/M2
LEFT ATRIUM VOLUME INDEX: 31.2 ML/M2
LEFT ATRIUM VOLUME MOD: 59.75 CM3
LEFT ATRIUM VOLUME: 63.69 CM3
LEFT INTERNAL DIMENSION IN SYSTOLE: 4 CM (ref 2.1–4)
LEFT VENTRICLE DIASTOLIC VOLUME INDEX: 36.99 ML/M2
LEFT VENTRICLE DIASTOLIC VOLUME: 75.45 ML
LEFT VENTRICLE MASS INDEX: 56 G/M2
LEFT VENTRICLE SYSTOLIC VOLUME INDEX: 34.3 ML/M2
LEFT VENTRICLE SYSTOLIC VOLUME: 69.9 ML
LEFT VENTRICULAR INTERNAL DIMENSION IN DIASTOLE: 4.13 CM (ref 3.5–6)
LEFT VENTRICULAR MASS: 113.74 G
LISTERIA MONOCYTOGENES: NOT DETECTED
LV LATERAL E/E' RATIO: 15 M/S
LV SEPTAL E/E' RATIO: 12.5 M/S
LYMPHOCYTES # BLD AUTO: 1.1 K/UL (ref 1–4.8)
LYMPHOCYTES # BLD AUTO: ABNORMAL K/UL (ref 1–4.8)
LYMPHOCYTES # BLD AUTO: ABNORMAL K/UL (ref 1–4.8)
LYMPHOCYTES NFR BLD: 0.5 % (ref 18–48)
LYMPHOCYTES NFR BLD: 2.5 % (ref 18–48)
LYMPHOCYTES NFR BLD: 3.5 % (ref 18–48)
LYMPHOCYTES NFR BLD: 4.1 % (ref 18–48)
LYMPHOCYTES NFR BLD: 6 % (ref 18–48)
MAGNESIUM SERPL-MCNC: 1.7 MG/DL (ref 1.6–2.6)
MAGNESIUM SERPL-MCNC: 1.8 MG/DL (ref 1.6–2.6)
MCH RBC QN AUTO: 23.2 PG (ref 27–31)
MCH RBC QN AUTO: 24.8 PG (ref 27–31)
MCH RBC QN AUTO: 26 PG (ref 27–31)
MCH RBC QN AUTO: 26.5 PG (ref 27–31)
MCH RBC QN AUTO: 26.5 PG (ref 27–31)
MCHC RBC AUTO-ENTMCNC: 30.6 G/DL (ref 32–36)
MCHC RBC AUTO-ENTMCNC: 30.8 G/DL (ref 32–36)
MCHC RBC AUTO-ENTMCNC: 30.9 G/DL (ref 32–36)
MCHC RBC AUTO-ENTMCNC: 32 G/DL (ref 32–36)
MCHC RBC AUTO-ENTMCNC: 32.1 G/DL (ref 32–36)
MCR-1: NOT DETECTED
MCV RBC AUTO: 76 FL (ref 82–98)
MCV RBC AUTO: 80 FL (ref 82–98)
MCV RBC AUTO: 81 FL (ref 82–98)
MCV RBC AUTO: 82 FL (ref 82–98)
MCV RBC AUTO: 86 FL (ref 82–98)
MEC A/C AND MREJ (MRSA): ABNORMAL
MEC A/C: ABNORMAL
METAMYELOCYTES NFR BLD MANUAL: 1.5 %
METAMYELOCYTES NFR BLD MANUAL: 17 %
METAMYELOCYTES NFR BLD MANUAL: 2 %
METAMYELOCYTES NFR BLD MANUAL: 3 %
MIN VOL: 13.5
MIN VOL: 13.5
MODE: ABNORMAL
MODE: ABNORMAL
MONOCYTES # BLD AUTO: 0.1 K/UL (ref 0.3–1)
MONOCYTES # BLD AUTO: ABNORMAL K/UL (ref 0.3–1)
MONOCYTES # BLD AUTO: ABNORMAL K/UL (ref 0.3–1)
MONOCYTES NFR BLD: 0 % (ref 4–15)
MONOCYTES NFR BLD: 0.4 % (ref 4–15)
MONOCYTES NFR BLD: 1 % (ref 4–15)
MONOCYTES NFR BLD: 2 % (ref 4–15)
MONOCYTES NFR BLD: 6 % (ref 4–15)
MV PEAK A VEL: 0.27 M/S
MV PEAK E VEL: 0.75 M/S
MV STENOSIS PRESSURE HALF TIME: 26.56 MS
MV VALVE AREA P 1/2 METHOD: 8.28 CM2
MYCOPLASMA PNEUMONIAE: NOT DETECTED
MYELOCYTES NFR BLD MANUAL: 0.5 %
MYELOCYTES NFR BLD MANUAL: 1 %
MYELOCYTES NFR BLD MANUAL: 1 %
MYELOCYTES NFR BLD MANUAL: 9 %
NDM GENE (CARBAPENEM RESISTANT): NOT DETECTED
NEISSERIA MENINGITIDIS: NOT DETECTED
NEUTROPHILS # BLD AUTO: 24.7 K/UL (ref 1.8–7.7)
NEUTROPHILS NFR BLD: 39 % (ref 38–73)
NEUTROPHILS NFR BLD: 75.5 % (ref 38–73)
NEUTROPHILS NFR BLD: 78 % (ref 38–73)
NEUTROPHILS NFR BLD: 86.5 % (ref 38–73)
NEUTROPHILS NFR BLD: 92.1 % (ref 38–73)
NEUTS BAND NFR BLD MANUAL: 11 %
NEUTS BAND NFR BLD MANUAL: 14.5 %
NEUTS BAND NFR BLD MANUAL: 16 %
NEUTS BAND NFR BLD MANUAL: 23 %
NRBC BLD-RTO: 0 /100 WBC
NUM UNITS TRANS PACKED RBC: NORMAL
OHS QRS DURATION: 84 MS
OHS QRS DURATION: 92 MS
OHS QRS DURATION: 96 MS
OHS QTC CALCULATION: 425 MS
OHS QTC CALCULATION: 443 MS
OHS QTC CALCULATION: 464 MS
OVALOCYTES BLD QL SMEAR: ABNORMAL
OXA-48-LIKE (CARBAPENEM RESISTANT): NOT DETECTED
PATH REV BLD -IMP: NORMAL
PATH REV BLD -IMP: NORMAL
PCO2 BLDA: 29.9 MMHG (ref 35–45)
PCO2 BLDA: 35.9 MMHG (ref 35–45)
PCO2 BLDA: 37.9 MMHG (ref 35–45)
PEEP: 5
PEEP: 5
PH SMN: 6.89 [PH] (ref 7.35–7.45)
PH SMN: 7.29 [PH] (ref 7.35–7.45)
PH SMN: 7.39 [PH] (ref 7.35–7.45)
PHOSPHATE SERPL-MCNC: 4 MG/DL (ref 2.7–4.5)
PIP: 21
PIP: 21
PLATELET # BLD AUTO: 103 K/UL (ref 150–450)
PLATELET # BLD AUTO: 122 K/UL (ref 150–450)
PLATELET # BLD AUTO: 67 K/UL (ref 150–450)
PLATELET # BLD AUTO: 96 K/UL (ref 150–450)
PLATELET # BLD AUTO: 96 K/UL (ref 150–450)
PLATELET BLD QL SMEAR: ABNORMAL
PMV BLD AUTO: 10.4 FL (ref 9.2–12.9)
PMV BLD AUTO: 9.2 FL (ref 9.2–12.9)
PMV BLD AUTO: 9.2 FL (ref 9.2–12.9)
PMV BLD AUTO: 9.5 FL (ref 9.2–12.9)
PMV BLD AUTO: 9.8 FL (ref 9.2–12.9)
PO2 BLDA: 44 MMHG (ref 40–60)
PO2 BLDA: 444 MMHG (ref 80–100)
PO2 BLDA: 49 MMHG (ref 40–60)
POC BE: -26 MMOL/L
POC BE: -7 MMOL/L
POC BE: -8 MMOL/L
POC IONIZED CALCIUM: 1.1 MMOL/L (ref 1.06–1.42)
POC IONIZED CALCIUM: >2.5 MMOL/L (ref 1.06–1.42)
POC PTINR: 2.8 (ref 0.9–1.2)
POC PTWBT: 31.5 SEC (ref 9.7–14.3)
POC SATURATED O2: 100 % (ref 95–100)
POC SATURATED O2: 49 % (ref 95–100)
POC SATURATED O2: 80 % (ref 95–100)
POC TCO2 (MEASURED): 19 MMOL/L (ref 23–29)
POC TCO2: 19 MMOL/L (ref 23–27)
POC TCO2: 19 MMOL/L (ref 24–29)
POC TCO2: 8 MMOL/L (ref 24–29)
POCT GLUCOSE: 124 MG/DL (ref 70–110)
POCT GLUCOSE: 154 MG/DL (ref 70–110)
POCT GLUCOSE: 174 MG/DL (ref 70–110)
POCT GLUCOSE: 210 MG/DL (ref 70–110)
POCT GLUCOSE: 51 MG/DL (ref 70–110)
POCT GLUCOSE: 53 MG/DL (ref 70–110)
POCT GLUCOSE: 98 MG/DL (ref 70–110)
POCT GLUCOSE: <20 MG/DL (ref 70–110)
POIKILOCYTOSIS BLD QL SMEAR: ABNORMAL
POIKILOCYTOSIS BLD QL SMEAR: SLIGHT
POIKILOCYTOSIS BLD QL SMEAR: SLIGHT
POTASSIUM BLD-SCNC: 3 MMOL/L (ref 3.5–5.1)
POTASSIUM BLD-SCNC: 4.1 MMOL/L (ref 3.5–5.1)
POTASSIUM SERPL-SCNC: 3.1 MMOL/L (ref 3.5–5.1)
POTASSIUM SERPL-SCNC: 3.5 MMOL/L (ref 3.5–5.1)
POTASSIUM SERPL-SCNC: 3.6 MMOL/L (ref 3.5–5.1)
POTASSIUM SERPL-SCNC: 4.8 MMOL/L (ref 3.5–5.1)
PROCALCITONIN SERPL IA-MCNC: 215.18 NG/ML
PROT SERPL-MCNC: 5.5 G/DL (ref 6–8.4)
PROT SERPL-MCNC: 5.6 G/DL (ref 6–8.4)
PROT SERPL-MCNC: 6.8 G/DL (ref 6–8.4)
PROTEUS SPECIES: NOT DETECTED
PROTHROMBIN TIME: 19.6 SEC (ref 9–12.5)
PROTHROMBIN TIME: 20 SEC (ref 9–12.5)
PROTHROMBIN TIME: 20.8 SEC (ref 9–12.5)
PROTHROMBIN TIME: >100 SEC (ref 9–12.5)
PROTHROMBIN TIME: >100 SEC (ref 9–12.5)
PSEUDOMONAS AERUGINOSA: NOT DETECTED
RA MAJOR: 4.4 CM
RA WIDTH: 3.53 CM
RBC # BLD AUTO: 3.3 M/UL (ref 4.6–6.2)
RBC # BLD AUTO: 3.81 M/UL (ref 4.6–6.2)
RBC # BLD AUTO: 4.31 M/UL (ref 4.6–6.2)
RBC # BLD AUTO: 4.31 M/UL (ref 4.6–6.2)
RBC # BLD AUTO: 4.65 M/UL (ref 4.6–6.2)
RESPIRATORY INFECTION PANEL SOURCE: ABNORMAL
RETICS/RBC NFR AUTO: 0.5 % (ref 0.4–2)
RIGHT VENTRICULAR END-DIASTOLIC DIMENSION: 3.09 CM
RSV RNA NPH QL NAA+NON-PROBE: NOT DETECTED
RV+EV RNA NPH QL NAA+NON-PROBE: DETECTED
SALMONELLA SP: NOT DETECTED
SAMPLE: ABNORMAL
SARS-COV-2 RNA RESP QL NAA+PROBE: NOT DETECTED
SERRATIA MARCESCENS: NOT DETECTED
SITE: ABNORMAL
SODIUM BLD-SCNC: 138 MMOL/L (ref 136–145)
SODIUM BLD-SCNC: 141 MMOL/L (ref 136–145)
SODIUM SERPL-SCNC: 130 MMOL/L (ref 136–145)
SODIUM SERPL-SCNC: 131 MMOL/L (ref 136–145)
SODIUM SERPL-SCNC: 134 MMOL/L (ref 136–145)
SODIUM SERPL-SCNC: 139 MMOL/L (ref 136–145)
SP02: 100
SP02: 100
SPECIMEN OUTDATE: NORMAL
SPHEROCYTES BLD QL SMEAR: ABNORMAL
STAPHYLOCOCCUS AUREUS: NOT DETECTED
STAPHYLOCOCCUS EPIDERMIDIS: NOT DETECTED
STAPHYLOCOCCUS LUGDUNESIS: NOT DETECTED
STAPHYLOCOCCUS SPECIES: NOT DETECTED
STENOTROPHOMONAS MALTOPHILIA: NOT DETECTED
STREPTOCOCCUS AGALACTIAE: NOT DETECTED
STREPTOCOCCUS PNEUMONIAE: NOT DETECTED
STREPTOCOCCUS PYOGENES: NOT DETECTED
STREPTOCOCCUS SPECIES: NOT DETECTED
TARGETS BLD QL SMEAR: ABNORMAL
TDI LATERAL: 0.05 M/S
TDI SEPTAL: 0.06 M/S
TDI: 0.06 M/S
TOXIC GRANULES BLD QL SMEAR: PRESENT
TRANS ERYTHROCYTES VOL PATIENT: NORMAL ML
TRANS ERYTHROCYTES VOL PATIENT: NORMAL ML
TROPONIN I SERPL DL<=0.01 NG/ML-MCNC: 0.1 NG/ML (ref 0–0.03)
TROPONIN I SERPL DL<=0.01 NG/ML-MCNC: 0.11 NG/ML (ref 0–0.03)
UNIT NUMBER: NORMAL
UNIT NUMBER: NORMAL
VAN A/B (VRE GENE): ABNORMAL
VIM GENE (CARBAPENEM RESISTANT): NOT DETECTED
VT: 550
VT: 550
WBC # BLD AUTO: 26.89 K/UL (ref 3.9–12.7)
WBC # BLD AUTO: 35.09 K/UL (ref 3.9–12.7)
WBC # BLD AUTO: 46.43 K/UL (ref 3.9–12.7)
WBC # BLD AUTO: 50.66 K/UL (ref 3.9–12.7)
WBC # BLD AUTO: 51.9 K/UL (ref 3.9–12.7)
WBC TOXIC VACUOLES BLD QL SMEAR: PRESENT
Z-SCORE OF LEFT VENTRICULAR DIMENSION IN END DIASTOLE: -3.87
Z-SCORE OF LEFT VENTRICULAR DIMENSION IN END SYSTOLE: 0.56

## 2024-01-01 PROCEDURE — 82330 ASSAY OF CALCIUM: CPT

## 2024-01-01 PROCEDURE — 84484 ASSAY OF TROPONIN QUANT: CPT

## 2024-01-01 PROCEDURE — 63600175 PHARM REV CODE 636 W HCPCS: Mod: JG | Performed by: STUDENT IN AN ORGANIZED HEALTH CARE EDUCATION/TRAINING PROGRAM

## 2024-01-01 PROCEDURE — 85007 BL SMEAR W/DIFF WBC COUNT: CPT | Mod: 91 | Performed by: INTERNAL MEDICINE

## 2024-01-01 PROCEDURE — 84295 ASSAY OF SERUM SODIUM: CPT

## 2024-01-01 PROCEDURE — 36620 INSERTION CATHETER ARTERY: CPT

## 2024-01-01 PROCEDURE — 63600175 PHARM REV CODE 636 W HCPCS

## 2024-01-01 PROCEDURE — 99900026 HC AIRWAY MAINTENANCE (STAT)

## 2024-01-01 PROCEDURE — 25000003 PHARM REV CODE 250: Performed by: STUDENT IN AN ORGANIZED HEALTH CARE EDUCATION/TRAINING PROGRAM

## 2024-01-01 PROCEDURE — 93010 ELECTROCARDIOGRAM REPORT: CPT | Mod: ,,, | Performed by: INTERNAL MEDICINE

## 2024-01-01 PROCEDURE — 85730 THROMBOPLASTIN TIME PARTIAL: CPT | Mod: 91

## 2024-01-01 PROCEDURE — 85610 PROTHROMBIN TIME: CPT | Mod: 91

## 2024-01-01 PROCEDURE — 37799 UNLISTED PX VASCULAR SURGERY: CPT

## 2024-01-01 PROCEDURE — 94761 N-INVAS EAR/PLS OXIMETRY MLT: CPT | Mod: XB

## 2024-01-01 PROCEDURE — 20000000 HC ICU ROOM

## 2024-01-01 PROCEDURE — 86965 POOLING BLOOD PLATELETS: CPT

## 2024-01-01 PROCEDURE — 83605 ASSAY OF LACTIC ACID: CPT | Mod: 91 | Performed by: INTERNAL MEDICINE

## 2024-01-01 PROCEDURE — 85027 COMPLETE CBC AUTOMATED: CPT

## 2024-01-01 PROCEDURE — 51702 INSERT TEMP BLADDER CATH: CPT

## 2024-01-01 PROCEDURE — 96361 HYDRATE IV INFUSION ADD-ON: CPT

## 2024-01-01 PROCEDURE — 83615 LACTATE (LD) (LDH) ENZYME: CPT

## 2024-01-01 PROCEDURE — 30233K1 TRANSFUSION OF NONAUTOLOGOUS FROZEN PLASMA INTO PERIPHERAL VEIN, PERCUTANEOUS APPROACH: ICD-10-PCS | Performed by: EMERGENCY MEDICINE

## 2024-01-01 PROCEDURE — 25000003 PHARM REV CODE 250: Performed by: EMERGENCY MEDICINE

## 2024-01-01 PROCEDURE — 36556 INSERT NON-TUNNEL CV CATH: CPT

## 2024-01-01 PROCEDURE — 99292 CRITICAL CARE ADDL 30 MIN: CPT | Mod: 25,GC,, | Performed by: INTERNAL MEDICINE

## 2024-01-01 PROCEDURE — P9017 PLASMA 1 DONOR FRZ W/IN 8 HR: HCPCS

## 2024-01-01 PROCEDURE — 99291 CRITICAL CARE FIRST HOUR: CPT

## 2024-01-01 PROCEDURE — 85610 PROTHROMBIN TIME: CPT | Performed by: EMERGENCY MEDICINE

## 2024-01-01 PROCEDURE — P9012 CRYOPRECIPITATE EACH UNIT: HCPCS

## 2024-01-01 PROCEDURE — 99213 OFFICE O/P EST LOW 20 MIN: CPT | Mod: S$GLB,,, | Performed by: NURSE PRACTITIONER

## 2024-01-01 PROCEDURE — 87633 RESP VIRUS 12-25 TARGETS: CPT

## 2024-01-01 PROCEDURE — 96367 TX/PROPH/DG ADDL SEQ IV INF: CPT

## 2024-01-01 PROCEDURE — P9021 RED BLOOD CELLS UNIT: HCPCS | Performed by: EMERGENCY MEDICINE

## 2024-01-01 PROCEDURE — 82962 GLUCOSE BLOOD TEST: CPT

## 2024-01-01 PROCEDURE — 87040 BLOOD CULTURE FOR BACTERIA: CPT | Mod: 59 | Performed by: EMERGENCY MEDICINE

## 2024-01-01 PROCEDURE — 25500020 PHARM REV CODE 255: Performed by: INTERNAL MEDICINE

## 2024-01-01 PROCEDURE — 83605 ASSAY OF LACTIC ACID: CPT

## 2024-01-01 PROCEDURE — 5A1935Z RESPIRATORY VENTILATION, LESS THAN 24 CONSECUTIVE HOURS: ICD-10-PCS | Performed by: INTERNAL MEDICINE

## 2024-01-01 PROCEDURE — 0T7D8ZZ DILATION OF URETHRA, VIA NATURAL OR ARTIFICIAL OPENING ENDOSCOPIC: ICD-10-PCS | Performed by: UROLOGY

## 2024-01-01 PROCEDURE — 99900035 HC TECH TIME PER 15 MIN (STAT)

## 2024-01-01 PROCEDURE — 93005 ELECTROCARDIOGRAM TRACING: CPT

## 2024-01-01 PROCEDURE — 85384 FIBRINOGEN ACTIVITY: CPT | Performed by: EMERGENCY MEDICINE

## 2024-01-01 PROCEDURE — 83010 ASSAY OF HAPTOGLOBIN QUANT: CPT | Performed by: STUDENT IN AN ORGANIZED HEALTH CARE EDUCATION/TRAINING PROGRAM

## 2024-01-01 PROCEDURE — 85610 PROTHROMBIN TIME: CPT | Mod: 91 | Performed by: INTERNAL MEDICINE

## 2024-01-01 PROCEDURE — 25000003 PHARM REV CODE 250

## 2024-01-01 PROCEDURE — 94002 VENT MGMT INPAT INIT DAY: CPT

## 2024-01-01 PROCEDURE — 63600175 PHARM REV CODE 636 W HCPCS: Performed by: INTERNAL MEDICINE

## 2024-01-01 PROCEDURE — 82803 BLOOD GASES ANY COMBINATION: CPT

## 2024-01-01 PROCEDURE — 85384 FIBRINOGEN ACTIVITY: CPT

## 2024-01-01 PROCEDURE — 30233M1 TRANSFUSION OF NONAUTOLOGOUS PLASMA CRYOPRECIPITATE INTO PERIPHERAL VEIN, PERCUTANEOUS APPROACH: ICD-10-PCS | Performed by: EMERGENCY MEDICINE

## 2024-01-01 PROCEDURE — 83735 ASSAY OF MAGNESIUM: CPT

## 2024-01-01 PROCEDURE — 99222 1ST HOSP IP/OBS MODERATE 55: CPT | Mod: GC,,, | Performed by: UROLOGY

## 2024-01-01 PROCEDURE — 80047 BASIC METABLC PNL IONIZED CA: CPT

## 2024-01-01 PROCEDURE — 25000003 PHARM REV CODE 250: Performed by: INTERNAL MEDICINE

## 2024-01-01 PROCEDURE — 85045 AUTOMATED RETICULOCYTE COUNT: CPT

## 2024-01-01 PROCEDURE — 86850 RBC ANTIBODY SCREEN: CPT | Performed by: EMERGENCY MEDICINE

## 2024-01-01 PROCEDURE — 87154 CUL TYP ID BLD PTHGN 6+ TRGT: CPT | Performed by: EMERGENCY MEDICINE

## 2024-01-01 PROCEDURE — 85379 FIBRIN DEGRADATION QUANT: CPT | Performed by: EMERGENCY MEDICINE

## 2024-01-01 PROCEDURE — 96366 THER/PROPH/DIAG IV INF ADDON: CPT

## 2024-01-01 PROCEDURE — 84100 ASSAY OF PHOSPHORUS: CPT

## 2024-01-01 PROCEDURE — 86920 COMPATIBILITY TEST SPIN: CPT | Performed by: EMERGENCY MEDICINE

## 2024-01-01 PROCEDURE — 87077 CULTURE AEROBIC IDENTIFY: CPT | Performed by: EMERGENCY MEDICINE

## 2024-01-01 PROCEDURE — 83735 ASSAY OF MAGNESIUM: CPT | Performed by: EMERGENCY MEDICINE

## 2024-01-01 PROCEDURE — 85027 COMPLETE CBC AUTOMATED: CPT | Performed by: STUDENT IN AN ORGANIZED HEALTH CARE EDUCATION/TRAINING PROGRAM

## 2024-01-01 PROCEDURE — 0BH17EZ INSERTION OF ENDOTRACHEAL AIRWAY INTO TRACHEA, VIA NATURAL OR ARTIFICIAL OPENING: ICD-10-PCS | Performed by: INTERNAL MEDICINE

## 2024-01-01 PROCEDURE — 85060 BLOOD SMEAR INTERPRETATION: CPT | Mod: ,,, | Performed by: PATHOLOGY

## 2024-01-01 PROCEDURE — 80048 BASIC METABOLIC PNL TOTAL CA: CPT | Mod: XB | Performed by: INTERNAL MEDICINE

## 2024-01-01 PROCEDURE — 85730 THROMBOPLASTIN TIME PARTIAL: CPT | Performed by: EMERGENCY MEDICINE

## 2024-01-01 PROCEDURE — 83605 ASSAY OF LACTIC ACID: CPT | Mod: 91

## 2024-01-01 PROCEDURE — 80053 COMPREHEN METABOLIC PANEL: CPT | Performed by: INTERNAL MEDICINE

## 2024-01-01 PROCEDURE — 85007 BL SMEAR W/DIFF WBC COUNT: CPT | Performed by: STUDENT IN AN ORGANIZED HEALTH CARE EDUCATION/TRAINING PROGRAM

## 2024-01-01 PROCEDURE — 80053 COMPREHEN METABOLIC PANEL: CPT | Mod: 91

## 2024-01-01 PROCEDURE — 30233N1 TRANSFUSION OF NONAUTOLOGOUS RED BLOOD CELLS INTO PERIPHERAL VEIN, PERCUTANEOUS APPROACH: ICD-10-PCS | Performed by: EMERGENCY MEDICINE

## 2024-01-01 PROCEDURE — P9016 RBC LEUKOCYTES REDUCED: HCPCS | Performed by: EMERGENCY MEDICINE

## 2024-01-01 PROCEDURE — 85014 HEMATOCRIT: CPT

## 2024-01-01 PROCEDURE — 27100171 HC OXYGEN HIGH FLOW UP TO 24 HOURS

## 2024-01-01 PROCEDURE — 85610 PROTHROMBIN TIME: CPT

## 2024-01-01 PROCEDURE — 85027 COMPLETE CBC AUTOMATED: CPT | Mod: 91 | Performed by: INTERNAL MEDICINE

## 2024-01-01 PROCEDURE — 84145 PROCALCITONIN (PCT): CPT | Performed by: EMERGENCY MEDICINE

## 2024-01-01 PROCEDURE — 85025 COMPLETE CBC W/AUTO DIFF WBC: CPT | Performed by: EMERGENCY MEDICINE

## 2024-01-01 PROCEDURE — 87186 SC STD MICRODIL/AGAR DIL: CPT | Performed by: EMERGENCY MEDICINE

## 2024-01-01 PROCEDURE — 85730 THROMBOPLASTIN TIME PARTIAL: CPT | Mod: 91 | Performed by: INTERNAL MEDICINE

## 2024-01-01 PROCEDURE — 85730 THROMBOPLASTIN TIME PARTIAL: CPT

## 2024-01-01 PROCEDURE — 36430 TRANSFUSION BLD/BLD COMPNT: CPT

## 2024-01-01 PROCEDURE — 63600175 PHARM REV CODE 636 W HCPCS: Performed by: EMERGENCY MEDICINE

## 2024-01-01 PROCEDURE — 31500 INSERT EMERGENCY AIRWAY: CPT | Mod: GC,,, | Performed by: INTERNAL MEDICINE

## 2024-01-01 PROCEDURE — 85270 CLOT FACTOR XI PTA: CPT | Performed by: INTERNAL MEDICINE

## 2024-01-01 PROCEDURE — 85220 BLOOC CLOT FACTOR V TEST: CPT | Performed by: INTERNAL MEDICINE

## 2024-01-01 PROCEDURE — 85240 CLOT FACTOR VIII AHG 1 STAGE: CPT | Performed by: INTERNAL MEDICINE

## 2024-01-01 PROCEDURE — C1751 CATH, INF, PER/CENT/MIDLINE: HCPCS

## 2024-01-01 PROCEDURE — 02HV33Z INSERTION OF INFUSION DEVICE INTO SUPERIOR VENA CAVA, PERCUTANEOUS APPROACH: ICD-10-PCS | Performed by: STUDENT IN AN ORGANIZED HEALTH CARE EDUCATION/TRAINING PROGRAM

## 2024-01-01 PROCEDURE — 84132 ASSAY OF SERUM POTASSIUM: CPT

## 2024-01-01 PROCEDURE — 99291 CRITICAL CARE FIRST HOUR: CPT | Mod: 25,GC,, | Performed by: INTERNAL MEDICINE

## 2024-01-01 PROCEDURE — 85250 CLOT FACTOR IX PTC/CHRSTMAS: CPT | Performed by: INTERNAL MEDICINE

## 2024-01-01 PROCEDURE — 80053 COMPREHEN METABOLIC PANEL: CPT | Performed by: EMERGENCY MEDICINE

## 2024-01-01 PROCEDURE — 85384 FIBRINOGEN ACTIVITY: CPT | Mod: 91 | Performed by: INTERNAL MEDICINE

## 2024-01-01 PROCEDURE — 36620 INSERTION CATHETER ARTERY: CPT | Mod: 59,GC,, | Performed by: INTERNAL MEDICINE

## 2024-01-01 PROCEDURE — 85007 BL SMEAR W/DIFF WBC COUNT: CPT

## 2024-01-01 PROCEDURE — 96365 THER/PROPH/DIAG IV INF INIT: CPT

## 2024-01-01 RX ORDER — FAMOTIDINE 20 MG/1
20 TABLET, FILM COATED ORAL ONCE
Status: COMPLETED | OUTPATIENT
Start: 2024-01-01 | End: 2024-01-01

## 2024-01-01 RX ORDER — SODIUM CHLORIDE 0.9 % (FLUSH) 0.9 %
10 SYRINGE (ML) INJECTION
Status: DISCONTINUED | OUTPATIENT
Start: 2024-01-01 | End: 2024-03-01 | Stop reason: HOSPADM

## 2024-01-01 RX ORDER — FAMOTIDINE 20 MG/1
20 TABLET, FILM COATED ORAL DAILY
Status: DISCONTINUED | OUTPATIENT
Start: 2024-01-01 | End: 2024-01-01

## 2024-01-01 RX ORDER — LIDOCAINE HYDROCHLORIDE 10 MG/ML
INJECTION, SOLUTION EPIDURAL; INFILTRATION; INTRACAUDAL; PERINEURAL
Status: COMPLETED
Start: 2024-01-01 | End: 2024-01-01

## 2024-01-01 RX ORDER — LORAZEPAM 1 MG/1
2 TABLET ORAL EVERY 12 HOURS PRN
Status: DISCONTINUED | OUTPATIENT
Start: 2024-01-01 | End: 2024-03-01 | Stop reason: HOSPADM

## 2024-01-01 RX ORDER — MUPIROCIN 20 MG/G
OINTMENT TOPICAL 2 TIMES DAILY
Status: DISCONTINUED | OUTPATIENT
Start: 2024-01-01 | End: 2024-03-01 | Stop reason: HOSPADM

## 2024-01-01 RX ORDER — PHENYLEPHRINE HCL IN 0.9% NACL 1 MG/10 ML
SYRINGE (ML) INTRAVENOUS
Status: COMPLETED
Start: 2024-01-01 | End: 2024-01-01

## 2024-01-01 RX ORDER — HYDROCODONE BITARTRATE AND ACETAMINOPHEN 500; 5 MG/1; MG/1
TABLET ORAL ONCE
Status: DISCONTINUED | OUTPATIENT
Start: 2024-01-01 | End: 2024-01-01

## 2024-01-01 RX ORDER — HYDROCODONE BITARTRATE AND ACETAMINOPHEN 500; 5 MG/1; MG/1
TABLET ORAL
Status: DISCONTINUED | OUTPATIENT
Start: 2024-01-01 | End: 2024-01-01

## 2024-01-01 RX ORDER — ONDANSETRON HYDROCHLORIDE 2 MG/ML
INJECTION, SOLUTION INTRAVENOUS
Status: COMPLETED
Start: 2024-01-01 | End: 2024-01-01

## 2024-01-01 RX ORDER — FENTANYL CITRATE-0.9 % NACL/PF 10 MCG/ML
0-200 PLASTIC BAG, INJECTION (ML) INTRAVENOUS CONTINUOUS
Status: DISCONTINUED | OUTPATIENT
Start: 2024-01-01 | End: 2024-01-01

## 2024-01-01 RX ORDER — ETOMIDATE 2 MG/ML
30 INJECTION INTRAVENOUS ONCE
Status: COMPLETED | OUTPATIENT
Start: 2024-01-01 | End: 2024-01-01

## 2024-01-01 RX ORDER — FLUDROCORTISONE ACETATE 0.1 MG/1
100 TABLET ORAL DAILY
Status: DISCONTINUED | OUTPATIENT
Start: 2024-01-01 | End: 2024-03-01 | Stop reason: HOSPADM

## 2024-01-01 RX ORDER — NOREPINEPHRINE BITARTRATE/D5W 4MG/250ML
PLASTIC BAG, INJECTION (ML) INTRAVENOUS
Status: COMPLETED
Start: 2024-01-01 | End: 2024-01-01

## 2024-01-01 RX ORDER — MIRABEGRON 50 MG/1
1 TABLET, EXTENDED RELEASE ORAL DAILY
Qty: 30 TABLET | Refills: 11 | Status: SHIPPED | OUTPATIENT
Start: 2024-01-01 | End: 2024-01-01

## 2024-01-01 RX ORDER — FENTANYL CITRATE-0.9 % NACL/PF 10 MCG/ML
0-200 PLASTIC BAG, INJECTION (ML) INTRAVENOUS CONTINUOUS
Status: DISCONTINUED | OUTPATIENT
Start: 2024-01-01 | End: 2024-03-01 | Stop reason: HOSPADM

## 2024-01-01 RX ORDER — METOPROLOL TARTRATE 1 MG/ML
5 INJECTION, SOLUTION INTRAVENOUS EVERY 5 MIN PRN
Status: DISCONTINUED | OUTPATIENT
Start: 2024-01-01 | End: 2024-01-01

## 2024-01-01 RX ORDER — PHYTONADIONE 1 MG/.5ML
2 INJECTION, EMULSION INTRAMUSCULAR; INTRAVENOUS; SUBCUTANEOUS ONCE
Status: DISCONTINUED | OUTPATIENT
Start: 2024-01-01 | End: 2024-01-01

## 2024-01-01 RX ORDER — FAMOTIDINE 10 MG/ML
20 INJECTION INTRAVENOUS DAILY
Status: DISCONTINUED | OUTPATIENT
Start: 2024-03-01 | End: 2024-03-01 | Stop reason: HOSPADM

## 2024-01-01 RX ORDER — PROPOFOL 10 MG/ML
0-50 INJECTION, EMULSION INTRAVENOUS CONTINUOUS
Status: DISCONTINUED | OUTPATIENT
Start: 2024-01-01 | End: 2024-03-01 | Stop reason: HOSPADM

## 2024-01-01 RX ORDER — IBUPROFEN 200 MG
24 TABLET ORAL
Status: DISCONTINUED | OUTPATIENT
Start: 2024-01-01 | End: 2024-03-01 | Stop reason: HOSPADM

## 2024-01-01 RX ORDER — LORAZEPAM 2 MG/ML
1 INJECTION INTRAMUSCULAR ONCE
Status: DISCONTINUED | OUTPATIENT
Start: 2024-01-01 | End: 2024-03-01 | Stop reason: HOSPADM

## 2024-01-01 RX ORDER — DEXTROSE MONOHYDRATE 100 MG/ML
INJECTION, SOLUTION INTRAVENOUS CONTINUOUS
Status: DISCONTINUED | OUTPATIENT
Start: 2024-01-01 | End: 2024-03-01 | Stop reason: HOSPADM

## 2024-01-01 RX ORDER — SUCCINYLCHOLINE CHLORIDE 20 MG/ML
90 INJECTION INTRAMUSCULAR; INTRAVENOUS ONCE
Status: COMPLETED | OUTPATIENT
Start: 2024-01-01 | End: 2024-01-01

## 2024-01-01 RX ORDER — GLUCAGON 1 MG
1 KIT INJECTION
Status: DISCONTINUED | OUTPATIENT
Start: 2024-01-01 | End: 2024-03-01 | Stop reason: HOSPADM

## 2024-01-01 RX ORDER — ETOMIDATE 2 MG/ML
INJECTION INTRAVENOUS
Status: COMPLETED
Start: 2024-01-01 | End: 2024-01-01

## 2024-01-01 RX ORDER — HYDROCODONE BITARTRATE AND ACETAMINOPHEN 500; 5 MG/1; MG/1
TABLET ORAL
Status: DISCONTINUED | OUTPATIENT
Start: 2024-01-01 | End: 2024-03-01 | Stop reason: HOSPADM

## 2024-01-01 RX ORDER — ROCURONIUM BROMIDE 10 MG/ML
INJECTION, SOLUTION INTRAVENOUS
Status: COMPLETED
Start: 2024-01-01 | End: 2024-01-01

## 2024-01-01 RX ORDER — SOLIFENACIN SUCCINATE 5 MG/1
5 TABLET, FILM COATED ORAL
Qty: 30 TABLET | Refills: 11 | Status: ON HOLD | OUTPATIENT
Start: 2024-01-01 | End: 2024-01-01

## 2024-01-01 RX ORDER — SOLIFENACIN SUCCINATE 5 MG/1
5 TABLET, FILM COATED ORAL DAILY
Qty: 30 TABLET | Refills: 11 | Status: SHIPPED | OUTPATIENT
Start: 2024-01-01 | End: 2024-01-01

## 2024-01-01 RX ORDER — NOREPINEPHRINE BITARTRATE/D5W 4MG/250ML
0-3 PLASTIC BAG, INJECTION (ML) INTRAVENOUS CONTINUOUS
Status: DISCONTINUED | OUTPATIENT
Start: 2024-01-01 | End: 2024-01-01

## 2024-01-01 RX ORDER — AMMONIUM LACTATE 12 G/100G
CREAM TOPICAL DAILY
Status: ON HOLD | COMMUNITY
Start: 2024-01-01 | End: 2024-01-01

## 2024-01-01 RX ORDER — IBUPROFEN 200 MG
16 TABLET ORAL
Status: DISCONTINUED | OUTPATIENT
Start: 2024-01-01 | End: 2024-03-01 | Stop reason: HOSPADM

## 2024-01-01 RX ORDER — HYDROXYZINE HYDROCHLORIDE 10 MG/1
10 TABLET, FILM COATED ORAL ONCE
Status: COMPLETED | OUTPATIENT
Start: 2024-01-01 | End: 2024-01-01

## 2024-01-01 RX ORDER — SUCCINYLCHOLINE CHLORIDE 20 MG/ML
INJECTION INTRAMUSCULAR; INTRAVENOUS
Status: COMPLETED
Start: 2024-01-01 | End: 2024-01-01

## 2024-01-01 RX ORDER — ROCURONIUM BROMIDE 10 MG/ML
90 INJECTION, SOLUTION INTRAVENOUS ONCE
Status: COMPLETED | OUTPATIENT
Start: 2024-01-01 | End: 2024-01-01

## 2024-01-01 RX ORDER — PROPOFOL 10 MG/ML
INJECTION, EMULSION INTRAVENOUS
Status: COMPLETED
Start: 2024-01-01 | End: 2024-01-01

## 2024-01-01 RX ADMIN — HYDROCORTISONE SODIUM SUCCINATE 100 MG: 100 INJECTION, POWDER, FOR SOLUTION INTRAMUSCULAR; INTRAVENOUS at 02:02

## 2024-01-01 RX ADMIN — SODIUM CHLORIDE, SODIUM LACTATE, POTASSIUM CHLORIDE, AND CALCIUM CHLORIDE 1000 ML: .6; .31; .03; .02 INJECTION, SOLUTION INTRAVENOUS at 10:02

## 2024-01-01 RX ADMIN — ROCURONIUM BROMIDE 90 MG: 10 INJECTION INTRAVENOUS at 10:02

## 2024-01-01 RX ADMIN — SODIUM CHLORIDE, POTASSIUM CHLORIDE, SODIUM LACTATE AND CALCIUM CHLORIDE 1000 ML: 600; 310; 30; 20 INJECTION, SOLUTION INTRAVENOUS at 06:02

## 2024-01-01 RX ADMIN — DEXTROSE MONOHYDRATE: 100 INJECTION, SOLUTION INTRAVENOUS at 12:02

## 2024-01-01 RX ADMIN — LORAZEPAM 2 MG: 1 TABLET ORAL at 09:02

## 2024-01-01 RX ADMIN — VASOPRESSIN 0.04 UNITS/MIN: 20 INJECTION INTRAVENOUS at 06:02

## 2024-01-01 RX ADMIN — NOREPINEPHRINE BITARTRATE 0.58 MCG/KG/MIN: 4 INJECTION, SOLUTION INTRAVENOUS at 04:02

## 2024-01-01 RX ADMIN — NOREPINEPHRINE BITARTRATE 1.8 MCG/KG/MIN: 1 INJECTION, SOLUTION, CONCENTRATE INTRAVENOUS at 05:02

## 2024-01-01 RX ADMIN — SUCCINYLCHOLINE CHLORIDE 90 MG: 20 INJECTION INTRAMUSCULAR; INTRAVENOUS at 10:02

## 2024-01-01 RX ADMIN — PHYTONADIONE 2 MG: 2 INJECTION, EMULSION INTRAMUSCULAR; INTRAVENOUS; SUBCUTANEOUS at 01:02

## 2024-01-01 RX ADMIN — HYDROXYZINE HYDROCHLORIDE 10 MG: 10 TABLET ORAL at 05:02

## 2024-01-01 RX ADMIN — HYDROCORTISONE SODIUM SUCCINATE 100 MG: 100 INJECTION, POWDER, FOR SOLUTION INTRAMUSCULAR; INTRAVENOUS at 05:02

## 2024-01-01 RX ADMIN — ERTAPENEM 1 G: 1 INJECTION INTRAMUSCULAR; INTRAVENOUS at 09:02

## 2024-01-01 RX ADMIN — NOREPINEPHRINE BITARTRATE 0.8 MCG/KG/MIN: 1 INJECTION, SOLUTION, CONCENTRATE INTRAVENOUS at 05:02

## 2024-01-01 RX ADMIN — ROCURONIUM BROMIDE 90 MG: 10 INJECTION, SOLUTION INTRAVENOUS at 10:02

## 2024-01-01 RX ADMIN — NOREPINEPHRINE BITARTRATE 0.58 MCG/KG/MIN: 4 INJECTION, SOLUTION INTRAVENOUS at 02:02

## 2024-01-01 RX ADMIN — SUCCINYLCHOLINE CHLORIDE 90 MG: 20 INJECTION, SOLUTION INTRAMUSCULAR; INTRAVENOUS at 10:02

## 2024-01-01 RX ADMIN — DEXTROSE MONOHYDRATE 250 ML: 100 INJECTION, SOLUTION INTRAVENOUS at 07:02

## 2024-01-01 RX ADMIN — DEXTROSE MONOHYDRATE 125 ML: 100 INJECTION, SOLUTION INTRAVENOUS at 03:02

## 2024-01-01 RX ADMIN — VANCOMYCIN HYDROCHLORIDE 1500 MG: 1.5 INJECTION, POWDER, LYOPHILIZED, FOR SOLUTION INTRAVENOUS at 08:02

## 2024-01-01 RX ADMIN — EPINEPHRINE 0.5 MCG/KG/MIN: 1 INJECTION INTRAMUSCULAR; INTRAVENOUS; SUBCUTANEOUS at 05:02

## 2024-01-01 RX ADMIN — NOREPINEPHRINE BITARTRATE 0.92 MCG/KG/MIN: 1 INJECTION, SOLUTION, CONCENTRATE INTRAVENOUS at 11:02

## 2024-01-01 RX ADMIN — IOHEXOL 100 ML: 350 INJECTION, SOLUTION INTRAVENOUS at 10:02

## 2024-01-01 RX ADMIN — PIPERACILLIN SODIUM AND TAZOBACTAM SODIUM 4.5 G: 4; .5 INJECTION, POWDER, FOR SOLUTION INTRAVENOUS at 07:02

## 2024-01-01 RX ADMIN — DEXTROSE MONOHYDRATE 250 ML: 100 INJECTION, SOLUTION INTRAVENOUS at 12:02

## 2024-01-01 RX ADMIN — SODIUM CHLORIDE, POTASSIUM CHLORIDE, SODIUM LACTATE AND CALCIUM CHLORIDE 1000 ML: 600; 310; 30; 20 INJECTION, SOLUTION INTRAVENOUS at 09:02

## 2024-01-01 RX ADMIN — ETOMIDATE 30 MG: 2 INJECTION INTRAVENOUS at 10:02

## 2024-01-01 RX ADMIN — NOREPINEPHRINE BITARTRATE 0.48 MCG/KG/MIN: 4 INJECTION, SOLUTION INTRAVENOUS at 12:02

## 2024-01-01 RX ADMIN — NOREPINEPHRINE BITARTRATE 0.62 MCG/KG/MIN: 4 INJECTION, SOLUTION INTRAVENOUS at 04:02

## 2024-01-01 RX ADMIN — NOREPINEPHRINE BITARTRATE 0.56 MCG/KG/MIN: 4 INJECTION, SOLUTION INTRAVENOUS at 01:02

## 2024-01-01 RX ADMIN — NOREPINEPHRINE BITARTRATE 0.05 MCG/KG/MIN: 4 INJECTION, SOLUTION INTRAVENOUS at 08:02

## 2024-01-01 RX ADMIN — PROPOFOL 10 MCG/KG/MIN: 10 INJECTION, EMULSION INTRAVENOUS at 10:02

## 2024-01-01 RX ADMIN — HUMAN ALBUMIN MICROSPHERES AND PERFLUTREN 0.11 MG: 10; .22 INJECTION, SOLUTION INTRAVENOUS at 11:02

## 2024-01-01 RX ADMIN — SODIUM CHLORIDE 1000 ML: 9 INJECTION, SOLUTION INTRAVENOUS at 07:02

## 2024-01-01 RX ADMIN — FAMOTIDINE 20 MG: 20 TABLET, FILM COATED ORAL at 02:02

## 2024-01-01 RX ADMIN — PROPOFOL 30 MCG/KG/MIN: 10 INJECTION, EMULSION INTRAVENOUS at 02:02

## 2024-01-01 RX ADMIN — ONDANSETRON 4 MG: 2 INJECTION INTRAMUSCULAR; INTRAVENOUS at 09:02

## 2024-01-01 RX ADMIN — PIPERACILLIN SODIUM AND TAZOBACTAM SODIUM 4.5 G: 4; .5 INJECTION, POWDER, FOR SOLUTION INTRAVENOUS at 05:02

## 2024-01-01 RX ADMIN — VASOPRESSIN 0.04 UNITS/MIN: 20 INJECTION INTRAVENOUS at 02:02

## 2024-01-01 RX ADMIN — NOREPINEPHRINE BITARTRATE 0.42 MCG/KG/MIN: 4 INJECTION, SOLUTION INTRAVENOUS at 11:02

## 2024-01-01 RX ADMIN — VASOPRESSIN 0.04 UNITS/MIN: 20 INJECTION INTRAVENOUS at 01:02

## 2024-01-03 NOTE — TELEPHONE ENCOUNTER
Left voicemail for patient to return call in regards to wound care referral and to assist patient with scheduling an appointment.    ----- Message from Anayeli Adams LPN sent at 1/2/2024 10:50 AM CST -----  Regarding: Wound Care Referral Maciel Contreras MRN [id number]  Good morning,    Can someone please contact patient regarding referral for wound care - patient has multiple wounds. See referral in media.    Thank you,  Anayeli   ----- Message -----  From: Ani Torres  Sent: 1/2/2024  10:39 AM CST  To: [facility] Wound Clinical Support    Hello,    I have pt being referred for wound care.  I have scanned the referral /records in to media mgr within Epic. Please review and contact for scheduling,thanks!           Ani RODRÍGUEZ   [facility]

## 2024-01-03 NOTE — TELEPHONE ENCOUNTER
----- Message from Anayeli Adams LPN sent at 1/2/2024 10:50 AM CST -----  Regarding: Wound Care Referral Maciel Contreras MRN [id number]  Good morning,    Can someone please contact patient regarding referral for wound care - patient has multiple wounds. See referral in media.    Thank you,  Anayeli   ----- Message -----  From: Ani Torres  Sent: 1/2/2024  10:39 AM CST  To: [facility] Wound Clinical Support    Hello,    I have pt being referred for wound care.  I have scanned the referral /records in to media mgr within Epic. Please review and contact for scheduling,thanks!           Ani RODRÍGUEZ   [facility]

## 2024-02-27 NOTE — PROGRESS NOTES
"Subjective:      Maciel Contreras is a 68 y.o. male who returns today regarding his neurogenic bladder.     The patient has an extensive medical history.      He has a neurogenic bladder and solitary right kidney 2/2 SCI from [facility] many years ago.  His bladder is managed with an indwelling garcia catheter- changed monthly by home health (last exchanged yesterday).     He developed a resistant/persistent UTIs in 2021. In the ED his creatinine was noted to be 4.4 and WBC was 35,000. CT showed "Small pneumoperitoneum.  Fluid and air bubbles tracking in the anterior of the abdomen and upper pelvis. Possibility of phlegmonous tissue and or abscess should be considered.Thick-walled urinary bladder.  Differential considerations may include cystitis, neoplasia, adjacent inflammatory condition, history of radiation therapy, or other etiology.  Associated right moderate hydroureteronephrosis. Multiple foci of nondependent air within the urinary bladder.  The possibility of fistulous communication cannot be entirely excluded.  Other causes of air in the urinary bladder include instrumentation with a Garcia catheter and gas-forming organism."     He is s/p cysto with stent placement with Dr. Streeter on 1/22/21. 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 The patient then underwent IR placement of perc drain for this fluid collection on 1/27.      Follow up CT cystogram in IR on 3/12/21 showed "no further evidence of bladder injury.  Previously identified abdominal fluid collection resolved."  Perc drain removed in IR. He was lost to follow up.     He returned to the clinic on 10/21/21 again with frequent urinary infections and slow urinary stream.     S/p cysto with urethral dilation and retrograde pyelogram/stent exchange with Dr. Adams on 11/2/21. Successful VT on 11/10/21. He was advised to self catheterize once " "daily for dilation and for complete bladder emptying.     He "no showed" scheduled visits on 11/17/22 and 2/3/22 that were set up to discuss stent management.     Returned to the clinic in July 2023. He is now s/p cysto with R ureteral stent removal with Dr. Adams on 8/1/23.   "Right retrograde pyelogram with mild hydroureter  Brisk efflux of contrast from right ureteral orifice on stent removal  Decision made not to replace stent"     Follow up BERT- "Prior left nephrectomy. Simple right renal cyst with no acute process seen"  Creatinine at baseline 1.4     Bladder is maintained by garcia catheter- exchanged monthly by home health- last changed last week.     Doing well. Denies fever/chills and flank pain. Reports leakage of urine around the catheter. Unable to tolerate ditropan due to dry mouth.     The following portions of the patient's history were reviewed and updated as appropriate: allergies, current medications, past family history, past medical history, past social history, past surgical history and problem list.    Review of Systems  Constitutional: no fever or chills  ENT: no nasal congestion or sore throat  Respiratory: no cough or shortness of breath  Cardiovascular: no chest pain or palpitations  Gastrointestinal: no nausea or vomiting, tolerating diet  Genitourinary: as per HPI  Hematologic/Lymphatic: no easy bruising or lymphadenopathy  Musculoskeletal: no arthralgias or myalgias  Neurological: no seizures or tremors  Behavioral/Psych: no auditory or visual hallucinations     Objective:   Vitals:   Vitals:    02/27/24 1424   BP: 136/73   Pulse: 62   Resp: 16     Physical Exam   General: alert and oriented, no acute distress  Head: normocephalic, atraumatic  Neck: supple, normal ROM  Respiratory: Symmetric expansion, non-labored breathing  Cardiovascular: regular rate and rhythm  Abdomen: soft, non tender, non distended  Genitourinary: garcia to gravity with clear, danie urine  Skin: normal coloration " and turgor, no rashes, no suspicious skin lesions noted  Neuro: alert and oriented x3, no gross deficits  Psych: normal judgment and insight, normal mood/affect, and non-anxious    Lab Review   Urinalysis demonstrates: no specimen, garcia  Lab Results   Component Value Date    WBC 20.47 (H) 07/06/2023    HGB 10.7 (L) 07/06/2023    HCT 34.1 (L) 07/06/2023    MCV 77 (L) 07/06/2023     (H) 07/06/2023     Lab Results   Component Value Date    CREATININE 1.4 08/11/2023    BUN 20 08/11/2023     Lab Results   Component Value Date    PSA 1.4 07/06/2023     Imaging   None    Assessment:     1. Bladder spasms    2. Solitary kidney, acquired    3. Neurogenic bladder      Plan:   Diagnoses and all orders for this visit:    Bladder spasms  -     mirabegron (MYRBETRIQ) 50 mg Tb24; Take 1 tablet (50 mg total) by mouth once daily.    Solitary kidney, acquired  -     US Retroperitoneal Complete; Future    Neurogenic bladder    Plan:  --Trial of myrbetriq for spasms- hopefully fewer SE  --BERT in August  --Continue monthly garcia exchanges per home health

## 2024-02-28 PROBLEM — R79.1 ELEVATED INR: Status: ACTIVE | Noted: 2024-01-01

## 2024-02-28 PROBLEM — D65 DIC (DISSEMINATED INTRAVASCULAR COAGULATION): Status: ACTIVE | Noted: 2024-01-01

## 2024-02-28 PROBLEM — E16.2 HYPOGLYCEMIA: Status: ACTIVE | Noted: 2024-01-01

## 2024-02-28 PROBLEM — R57.9 SHOCK: Status: ACTIVE | Noted: 2024-01-01

## 2024-02-28 PROBLEM — R31.9 HEMATURIA: Status: ACTIVE | Noted: 2024-01-01

## 2024-02-28 PROBLEM — R65.20 SEVERE SEPSIS: Status: ACTIVE | Noted: 2020-02-07

## 2024-02-28 NOTE — TELEPHONE ENCOUNTER
"Spoke with the pt. Garcia draining well. Hematuria resolving. Home health in route to evaluate garcia.     ----- Message from Liya Newton sent at 2/28/2024 10:15 AM CST -----  Regarding: Call back  "Type:  Patient Call Back    Who Called:Hayden ([facility])    What is the reqeust in detail:Pt was seen today by Wound care nurse  and would like to inform NP pt has blood in his catheter. Please advise    Can the clinic reply by [facility]?no     Best Call Back Number:[phone]      Additional Information:Would like to know what to do pt was seen on 2/27. Would like a call back asap to know what to do            "

## 2024-02-28 NOTE — TELEPHONE ENCOUNTER
Addressed in previous message.    ----- Message from Kenyetta Carrero sent at 2/28/2024  9:53 AM CST -----  Regarding: Requesting advice  .Type:  Needs Medical Advice/Symptom-based Call    Who Called: self     Symptoms (please be specific): blood in urine     How long has patient had these symptoms:  this morning     Would the patient rather a call back or a response via [facility]? Call     Best Call Back Number: .[phone]      Additional Information:

## 2024-02-28 NOTE — TELEPHONE ENCOUNTER
Addressed in phone call.    Garcia draining well now. States that garcia was possibly tugged earlier this morning and with bloody urine. Urine is now clearing and garcia is draining well. He has called home health and they will be by shortly for assessment.     New rx for vesicare sent as an alternative to myrbetriq.

## 2024-02-29 PROBLEM — G93.40 ACUTE ENCEPHALOPATHY: Status: ACTIVE | Noted: 2024-01-01

## 2024-02-29 PROBLEM — L89.893: Status: ACTIVE | Noted: 2024-01-01

## 2024-02-29 PROBLEM — A41.9 SEPTIC SHOCK: Status: ACTIVE | Noted: 2024-01-01

## 2024-02-29 PROBLEM — N31.9 NEUROGENIC BLADDER: Status: ACTIVE | Noted: 2024-01-01

## 2024-02-29 PROBLEM — R78.81 BACTEREMIA DUE TO KLEBSIELLA PNEUMONIAE: Status: ACTIVE | Noted: 2024-01-01

## 2024-02-29 PROBLEM — B96.1 BACTEREMIA DUE TO KLEBSIELLA PNEUMONIAE: Status: ACTIVE | Noted: 2024-01-01

## 2024-02-29 PROBLEM — L89.303 PRESSURE INJURY OF BUTTOCK, STAGE 3: Status: ACTIVE | Noted: 2024-01-01

## 2024-02-29 PROBLEM — T83.511A URINARY TRACT INFECTION ASSOCIATED WITH INDWELLING URETHRAL CATHETER: Status: ACTIVE | Noted: 2024-01-01

## 2024-02-29 PROBLEM — N39.0 URINARY TRACT INFECTION ASSOCIATED WITH INDWELLING URETHRAL CATHETER: Status: ACTIVE | Noted: 2024-01-01

## 2024-02-29 PROBLEM — N17.0 ATN (ACUTE TUBULAR NECROSIS): Status: ACTIVE | Noted: 2024-01-01

## 2024-02-29 PROBLEM — I50.21 ACUTE SYSTOLIC HEART FAILURE: Status: ACTIVE | Noted: 2024-01-01

## 2024-02-29 PROBLEM — R65.21 SEPTIC SHOCK: Status: ACTIVE | Noted: 2024-01-01

## 2024-02-29 NOTE — SUBJECTIVE & OBJECTIVE
Interval History/Significant Events: intubated, 3 pressor, DNR    Review of Systems   Unable to perform ROS: Intubated     Objective:     Vital Signs (Most Recent):  Temp: 97.4 °F (36.3 °C) (02/29/24 1505)  Pulse: 78 (02/29/24 1700)  Resp: (!) 28 (02/29/24 1700)  BP: (!) 90/56 (02/29/24 1110)  SpO2: 100 % (02/29/24 1700) Vital Signs (24h Range):  Temp:  [93.9 °F (34.4 °C)-98.9 °F (37.2 °C)] 97.4 °F (36.3 °C)  Pulse:  [] 78  Resp:  [10-39] 28  SpO2:  [58 %-100 %] 100 %  BP: ()/(43-60) 90/56  Arterial Line BP: ()/() 88/65   Weight: 83.5 kg (184 lb)  Body mass index is 25.66 kg/m².      Intake/Output Summary (Last 24 hours) at 2/29/2024 1752  Last data filed at 2/29/2024 1700  Gross per 24 hour   Intake 62692.58 ml   Output 1420 ml   Net 8587.58 ml          Physical Exam  Constitutional:       Appearance: He is ill-appearing and toxic-appearing.      Interventions: He is intubated.   Neck:      Comments: Central line placed in L IJ, bleeding noted around site  Cardiovascular:      Rate and Rhythm: Normal rate. Rhythm irregular.   Pulmonary:      Effort: Tachypnea present. He is intubated.   Abdominal:      General: There is no distension.      Tenderness: There is no abdominal tenderness.   Musculoskeletal:      Right lower leg: No edema.      Left lower leg: No edema.      Comments: Cannot move lower extremities, wound noted    Skin:     General: Skin is cool and dry.      Findings: Wound present.      Comments: Wound on sacrum, mostly closed,    Neurological:      Sensory: Sensory deficit present.      Motor: Weakness present.      Comments: intubated            Vents:  Vent Mode: A/C (02/29/24 1304)  Set Rate: 24 BPM (02/29/24 1304)  Vt Set: 550 mL (02/29/24 1304)  PEEP/CPAP: 5 cmH20 (02/29/24 1304)  Oxygen Concentration (%): 50 (02/29/24 1705)  Peak Airway Pressure: 21 cmH20 (02/29/24 1304)  Plateau Pressure: 0 cmH20 (02/29/24 1304)  Total Ve: 13.5 L/m (02/29/24 1304)  Negative Inspiratory  Force (cm H2O): 0 (02/29/24 1705)  F/VT Ratio<105 (RSBI): (!) 42.78 (02/29/24 1304)  Lines/Drains/Airways       Central Venous Catheter Line  Duration             Percutaneous Central Line Insertion/Assessment - Triple Lumen  02/28/24 2346 Internal Jugular Left <1 day              Drain  Duration                  NG/OG Tube 02/29/24 1022 orogastric <1 day         Urethral Catheter 02/29/24 0300 Latex 22 Fr. <1 day              Airway  Duration                  Airway - Non-Surgical 02/29/24 1016 Endotracheal Tube <1 day              Arterial Line  Duration             Arterial Line 02/29/24 1015 Left Radial <1 day              Peripheral Intravenous Line  Duration                  Peripheral IV - Single Lumen 02/28/24 1845 18 G Anterior;Left Hand <1 day                  Significant Labs:    CBC/Anemia Profile:  Recent Labs   Lab 02/29/24  0204 02/29/24  0403 02/29/24  1044 02/29/24  1149   WBC 46.43* 50.66*  --  51.90*   HGB 11.2* 11.4*  --  10.1*   HCT 35.0* 35.5* 26* 32.8*   * 96*  --  67*   MCV 81* 82  --  86   RDW 21.3* 21.4*  --  21.9*   RETIC 0.5  --   --   --         Chemistries:  Recent Labs   Lab 02/28/24  1859 02/29/24  0204 02/29/24  0403 02/29/24  1149    131* 130* 134*   K 3.1* 3.5 3.6 4.8    102 98 103   CO2 17* 14* 14* <5*   BUN 34* 33* 33* 35*   CREATININE 1.9* 2.1* 2.3* 2.3*   CALCIUM 8.5* 7.5* 7.4* 8.8   ALBUMIN 2.5* 2.1* 2.1*  --    PROT 6.8 5.5* 5.6*  --    BILITOT 1.0 1.4* 1.8*  --    ALKPHOS 162* 97 95  --    ALT 19 22 33  --    AST 35 54* 75*  --    MG 1.8  --  1.7  --    PHOS  --   --  4.0  --        All pertinent labs within the past 24 hours have been reviewed.    Significant Imaging:  I have reviewed all pertinent imaging results/findings within the past 24 hours.

## 2024-02-29 NOTE — EICU
Intervention Initiated From:  Bedside    Catherine intervened regarding:  Documentation    Took over from Myriam    Titration of Norepinephrine continued to maintain SBP >90 per Dr Mcmahon at bedside. Epinephrine gtt ordered.     1100- Pt stabilized with Levophed @ 0.9mcg/kg/min, Epi @ 0.2mcg/kg/min, Vaso @ 0.04mcg/kg/min. Propofol @ 20mcg/kg/min.

## 2024-02-29 NOTE — ED NOTES
Nurses Note -- 4 Eyes      2/28/2024   6:45 PM      Skin assessed during: Daily Assessment      [] No Altered Skin Integrity Present    []Prevention Measures Documented      [x] Yes- Altered Skin Integrity Present or Discovered   [x] LDA Added if Not in Epic (Describe Wound)   [x] New Altered Skin Integrity was Present on Admit and Documented in LDA   [x] Wound Image Taken    Wound Care Consulted? Yes    Attending Nurse:  Ramandeep Bullard RN/Staff Member:   Dr larson

## 2024-02-29 NOTE — ASSESSMENT & PLAN NOTE
Labs on admission notable for INR > 10, PT > 100, D-dimer > 33, and fibrinogen < 70 with dropping platelet count (122>96)    - Hemolysis labs ordered  - Hematology consulted and contacted  - Trend INR, PT, D-dimer, and fibrinogen  - Replete with FFP, Cryo, and Plts as needed  - Unsure of underlying cause at this time, follow up with infectious work up and continue Abx

## 2024-02-29 NOTE — CONSULTS
Consult received, patient to be admitted to MICU. Full H&P to follow.    Cyn Orlando MD  Pulmonary/Critical Care Fellow  02/28/2024 10:55 PM  Spectra: [id number]

## 2024-02-29 NOTE — ASSESSMENT & PLAN NOTE
Hx of recurrent UTI, last treated for UTI ~1 month ago per patient    - UA with reflex to Cx pending  - Started on broad spectrum Abx, narrow coverage as indicated

## 2024-02-29 NOTE — PLAN OF CARE
MICU DAILY GOALS     Family/Goals of care/Code Status   Code Status: Full Code    24H Vital Sign Range  Temp:  [97 °F (36.1 °C)-98.9 °F (37.2 °C)]   Pulse:  []   Resp:  [10-39]   BP: ()/(43-60)   SpO2:  [81 %-100 %]   Arterial Line BP: ()/(15-73)      Shift Events (include procedures and significant events)   Patient alert and oriented x4. Admitted from ED. Arterial and triple lumen catheter placed by critical care team. Levo and vaso infusing.Urology exchanged and irrigated garcia. Bedside u/s performed.    AWAKE RASS: Goal -    Actual -      Restraint necessity: Not necessary   BREATHE SBT: Not intubated    Coordinate A & B, analgesics/sedatives Pain: managed   SAT: Not intubated   Delirium CAM-ICU: Overall CAM-ICU: Negative   Early(intubated/ Progressive (non-intubated) Mobility MOVE Screen (INTUBATED ONLY): Not intubated    Activity: Activity Management: Arm raise - L1, Leg kicks - L2   Feeding/Nutrition Diet order: Diet/Nutrition Received: regular,     Thrombus DVT prophylaxis: VTE Required Core Measure: Pharmacological prophylaxis initiated/maintained   HOB Elevation Head of Bed (HOB) Positioning: HOB elevated   Ulcer Prophylaxis GI: yes   Glucose control managed     Skin Skin assessed during: Q Shift Change    Sacrum intact/not altered? No  Heels intact/not altered? No  Surgical wound? No    CHECK ONE!   (no altered skin or altered skin) and sub boxes:  [] No Altered Skin Integrity Present    []Prevention Measures Documented    [] Altered Skin Integrity Present or Discovered   [] LDA present in EPIC, daily doc completed              [] LDA added if not in EPIC (describe wound).                    When describing wound, do not stage, use descriptive words only.    [] Wound Image Taken (required on admit,                   transfer/discharge and every Tuesday)    Wound Care Consulted? Yes    Attending Nurse:     Second RN/Staff Member:    Bowel Function no issues    Indwelling Catheter  Necessity      Urethral Catheter 02/29/24 0300 Latex 22 Fr.-Reason for Continuing Urinary Catheterization: Placed by Urology Service, Chronic Indwelling Urinary Catheter on Admission          De-escalation Antibiotics Yes        VS and assessment per flow sheet, patient progressing towards goals as tolerated, plan of care reviewed with patient and family, all concerns addressed   Problem: Adult Inpatient Plan of Care  Goal: Plan of Care Review  Outcome: Ongoing, Progressing  Goal: Patient-Specific Goal (Individualized)  Outcome: Ongoing, Progressing

## 2024-02-29 NOTE — EICU
Intervention Initiated From:  Bedside    Catherine intervened regarding:  Respiratory and Time-Out    Comments:   eLert received for intubation. Dr Mcmahon and Dr Montoya at bedside to perform intubation. Time out performed prior to intubation.   1014: 30 mg Etomidate IVP given x1; 90 mg Succinylcholine IVP given x1  1016: 90 mg Rocuronium IVP given x1  1016: Pt intubated w/ an 8.0 ETT; 23 cm at the top teeth. + color change noted, bilat breath sounds auscultated. Chest xray ordered for placement confirmation. Propofol gtt started for comfort.  Pt vikram well w/ no adverse events noted.

## 2024-02-29 NOTE — PROCEDURES
"Maciel Contreras is a 68 y.o. male patient.    Temp: 97 °F (36.1 °C) (24 0705)  Pulse: 93 (24 0800)  Resp: (!) 26 (24 08)  BP: (!) 97/58 (24 0006)  SpO2: 98 % (24)  Weight: 83.5 kg (184 lb) (24)  Height: 5' 11" (180.3 cm) (24)       Arterial Line    Date/Time: 2024 10:26 AM  Location procedure was performed: [facility] CRITICAL CARE MEDICINE    Performed by: Joan Montoya MD  Authorized by: Ricardo Mcmahon MD  Assisting provider: Ricardo Mcmahon MD  Pre-op Diagnosis: septic shock  Post-operative diagnosis: septic shock  Consent Done: Yes  Consent: Verbal consent obtained.  Risks and benefits: risks, benefits and alternatives were discussed  Consent given by: patient  Required items: required blood products, implants, devices, and special equipment available  Patient identity confirmed: , MRN and name  Time out: Immediately prior to procedure a "time out" was called to verify the correct patient, procedure, equipment, support staff and site/side marked as required.  Preparation: Patient was prepped and draped in the usual sterile fashion.  Indications: multiple ABGs, respiratory failure and hemodynamic monitoring  Location: left radial  Anesthesia: local infiltration    Anesthesia:  Local Anesthetic: lidocaine 1% without epinephrine  Anesthetic total: 3 mL  Number of attempts: 1  Technical procedures used: ultrasound  Post-procedure: line sutured and dressing applied  Post-procedure CMS: unchanged  Patient tolerance: Patient tolerated the procedure well with no immediate complications  Comments: Joan Montoya MD  \[facility]\""/[facility] Fellow             2024    "

## 2024-02-29 NOTE — ASSESSMENT & PLAN NOTE
Hx of HTN     - hold home antihypertensive medications in the setting of hypotension requiring vasopressors

## 2024-02-29 NOTE — PROCEDURES
Procedure note    Patient is a 68M presenting for shock.     Vitals:    02/29/24 0006   BP: (!) 97/58   Pulse: 92   Resp: 19   Temp:        Insert arterial line    Date/Time: 2/29/2024 12:21 AM  Location procedure was performed: [facility] CRITICAL CARE MEDICINE    Performed by: aRmirez Saldaña MD  Authorized by: Ramirez Saldaña MD  Assisting provider: Ramirez Saldaña MD  Pre-op Diagnosis: shock  Post-operative diagnosis: shock  Consent Done: Yes  Consent: Verbal consent obtained.  Risks and benefits: risks, benefits and alternatives were discussed  Consent given by: patient  Location: right radial  Needle gauge: 18  Seldinger technique: Seldinger technique used  Number of attempts: 1  Complications: No  Estimated blood loss (mL): 0  Post-procedure: line sutured  Post-procedure CMS: normal  Patient tolerance: Patient tolerated the procedure well with no immediate complications       Ramirez Saldaña  Critical Care Medicine  [facility] Cardiac Medical ICU

## 2024-02-29 NOTE — PROCEDURES
"Maciel Contreras is a 68 y.o. male patient.    Temp: 97 °F (36.1 °C) (24 07)  Pulse: 93 (24 08)  Resp: (!) 26 (24)  BP: (!) 97/58 (24 0006)  SpO2: 98 % (24)  Weight: 83.5 kg (184 lb) (24)  Height: 5' 11" (180.3 cm) (24)       Intubation    Date/Time: 2024 10:29 AM  Location procedure was performed: [facility] CRITICAL CARE MEDICINE    Performed by: Joan Montoya MD  Authorized by: Ricardo Mcmahon MD  Pre-operative diagnosis: acute encephalopathy due to septic shock  Post-operative diagnosis: acute encephalopathy due to septic shock  Consent Done: Yes  Consent: Verbal consent obtained.  Consent given by: patient  Patient understanding: patient states understanding of the procedure being performed  Required items: required blood products, implants, devices, and special equipment available  Patient identity confirmed: , MRN and name  Time out: Immediately prior to procedure a "time out" was called to verify the correct patient, procedure, equipment, support staff and site/side marked as required.  Indications: airway protection  Description of findings: grade 1 view, minimal secretions, uncomplicated intubation   Intubation method: video-assisted  Patient status: paralyzed (RSI)  Preoxygenation: mask  Sedatives: etomidate  Laryngoscope size: Glide 4  Tube size: 8.0 mm  Tube type: cuffed  Number of attempts: 1  Cricoid pressure: no  Cords visualized: yes  Post-procedure assessment: CO2 detector  Breath sounds: equal  Cuff inflated: yes  ETT to teeth: 23 cm  Tube secured with: ETT mcconnell  Chest x-ray interpreted by: ordered stat and pending.  Patient tolerance: Patient tolerated the procedure well with no immediate complications  Complications: No  Estimated blood loss (mL): 0  Comments: Pt noted to have paraplegia. Pt was not NPO & had recently taken in fluids & complained of feeling like he was going to vomit. Pt preoxygenated. No BVM " ventilation administered. Administered ankita + succ in tandem to achieve rapid paralysis & mitigate risk of hyperkalemia. Monitored potassium x2 during post-intubation period (normal).       I independently evaluated the patient. I was present for the procedure & directly supervised the fellow performing the procedure. I have reviewed the fellow's procedure note.    Ricardo Mcmahon MD      2/29/2024

## 2024-02-29 NOTE — CONSULTS
[facility] - Cardiac Medical ICU  Wound Care    Patient Name:  Maciel Contreras   MRN:  [id number]  Date: 2/29/2024  Diagnosis: Shock    History:     Past Medical History:   Diagnosis Date    Anxiety     Decubitus ulcer     Depression     Diverticulosis     Encounter for blood transfusion     Gallstones     Hypertension     Paraplegic spinal paralysis     Urethral stricture     UTI (urinary tract infection)        Social History     Socioeconomic History    Marital status: Single   Tobacco Use    Smoking status: Every Day     Current packs/day: 0.25     Types: Cigarettes    Smokeless tobacco: Never   Substance and Sexual Activity    Alcohol use: No    Drug use: Yes     Types: Benzodiazepines, Oxycodone       Precautions:     Allergies as of 02/28/2024 - Reviewed 02/28/2024   Allergen Reaction Noted    Cephalexin Rash 01/10/2006       WO Assessment Details/Treatment     Patient seen for wound care consultation.   Reviewed chart for this encounter.   See Flow Sheet for findings.      RECOMMENDATIONS: Spoke with primary RN. MD at bedside. Patient is not stable at the moment for inpatient wound care visit. Will follow up 3/1/2024.    Discussed POC with patient and primary nurse.   See EMR for orders & patient education.    Discussed nutrition and the role of protein in wound healing with the patient. Instructed patient to optimize protein for wound healing.    Bedside nursing to continue care & monitoring.  Bedside nursing to maintain pressure injury prevention interventions.            02/29/24 0925   WOCN Assessment   WOCN Total Time (mins) 15   Visit Date 02/29/24   Visit Time 0925   Consult Type New   WOCN Speciality Wound   Intervention chart review;coordination of care   Teaching on-going         Chuy Machuca RN  02/29/2024
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
## 2024-02-29 NOTE — EICU
Intervention Initiated From:  Bedside    Catherine intervened regarding:  Documentation and Time-Out  Comments: Called into room via eLert to do time-out for Percutaneous Central line & Arterial line placement & for assistance w/ documentation (see time-out record).  LDA's added in Epic. CXR order placed & department called.  Time elapsed: 50 mins

## 2024-02-29 NOTE — ASSESSMENT & PLAN NOTE
BP on arrival to ED 70/50s. Received 1 U PRBC, 3L IVF, and started on Levophed infusion in ED    - Hemorrhagic vs. Septic shock   - Follow up with infectious work up   - continue Abx  - Transfuse for Hgb > 7  - Continue vasopressors to maintain MAP > 65
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
## 2024-02-29 NOTE — ASSESSMENT & PLAN NOTE
Hx of recurrent sacral decubitus ulcers with flaps x7    - Multiple wounds noted on admission in ED  - Wound care consulted  - Q2H turns
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
## 2024-02-29 NOTE — EICU
Intervention Initiated From:  Bedside    Catherine intervened regarding:  Other    Nurse Notified:  Yes    Doctor Notified:  Yes    Comments: 1036 called into room for pt I distress, low BP 62/43, Dr Mcmahon @ bedside, never lost pulse  1 amp CA, IVP  1 amp Bicarb IVP  1 Epi IVP  Levo infusing @ 1.2 mcg/kg/min  Order for ABG with lytes, per verbal order  Blood bank working on cryoprecipitate, will tube when ready  Camera out 1045, Meme took over

## 2024-02-29 NOTE — EICU
Nurses Note -- 4 Eyes      2/29/2024   3:24 AM      Skin assessed during: Admit      [] No Altered Skin Integrity Present    []Prevention Measures Documented      [x] Yes- Altered Skin Integrity Present or Discovered   [x] LDA Added if Not in Epic (Describe Wound)   [x] New Altered Skin Integrity was Present on Admit and Documented in LDA   [x] Wound Image Taken    Wound Care Consulted? Yes    Attending Nurse:  Izabel Bullard RN/Staff Member:   Meme Vargas RN

## 2024-02-29 NOTE — ASSESSMENT & PLAN NOTE
This patient does have evidence of infective focus  My overall impression is sepsis.  Source: Unknown  Antibiotics given-   Antibiotics (72h ago, onward)      Start     Stop Route Frequency Ordered    02/28/24 2330  mupirocin 2 % ointment         03/04/24 2059 Nasl 2 times daily 02/28/24 2217          Latest lactate reviewed-  Recent Labs   Lab 02/28/24  1907   POCLAC 7.61*     - Hx of recurrent UTI  - Leukocytosis noted on admission  - POC Lactate 7.6, serum lactic acid ordered  - UA/UCx pending  - BCx ordered  - Pro-Manuel noted to be elevated in ED (215)  - Broad spectrum Abx ordered
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
## 2024-02-29 NOTE — PROGRESS NOTES
"Pharmacokinetic Initial Assessment: IV Vancomycin    Assessment/Plan:    Initiate intravenous vancomycin with loading dose of 1500 mg once with subsequent doses when random concentrations are less than 20 mcg/mL  Desired empiric serum trough concentration is 15 to 20 mcg/mL  Draw vancomycin random level on 2/29 at 2000.  Pharmacy will continue to follow and monitor vancomycin.      Please contact pharmacy at extension [phone] with any questions regarding this assessment.     Thank you for the consult,   Moraima Hathaway       Patient brief summary:  Maciel Contreras is a 68 y.o. male initiated on antimicrobial therapy with IV Vancomycin for treatment of suspected sepsis    Drug Allergies:   Review of patient's allergies indicates:   Allergen Reactions    Cephalexin Rash     BURNING       Actual Body Weight:   83.5 kg     Renal Function:   Estimated Creatinine Clearance: 40.7 mL/min (A) (based on SCr of 1.9 mg/dL (H)).,     Dialysis Method (if applicable):  N/A    CBC (last 72 hours):  Recent Labs   Lab Result Units 02/28/24  1859 02/28/24  2223   WBC K/uL 26.89* 35.09*   Hemoglobin g/dL 10.8* 8.2*   Hematocrit % 35.3* 26.5*   Platelets K/uL 122* 96*   Gran % % 92.1* 86.5*   Lymph % % 4.1* 0.5*   Mono % % 0.4* 0.0*   Eosinophil % % 0.1 0.0   Basophil % % 0.4 0.0   Differential Method  Automated Manual       Metabolic Panel (last 72 hours):  Recent Labs   Lab Result Units 02/28/24  1859   Sodium mmol/L 139   Potassium mmol/L 3.1*   Chloride mmol/L 104   CO2 mmol/L 17*   Glucose mg/dL 47*   BUN mg/dL 34*   Creatinine mg/dL 1.9*   Albumin g/dL 2.5*   Total Bilirubin mg/dL 1.0   Alkaline Phosphatase U/L 162*   AST U/L 35   ALT U/L 19   Magnesium mg/dL 1.8       Drug levels (last 3 results):  No results for input(s): "VANCOMYCINRA", "VANCORANDOM", "VANCOMYCINPE", "VANCOPEAK", "VANCOMYCINTR", "VANCOTROUGH" in the last 72 hours.    Microbiologic Results:  Microbiology Results (last 7 days)       Procedure Component Value Units " Date/Time    Blood culture x two cultures. Draw prior to antibiotics. [[id number]] Collected: 02/28/24 1923    Order Status: Sent Specimen: Blood from Peripheral, Hand, Right Updated: 02/28/24 1936    Blood culture x two cultures. Draw prior to antibiotics. [[id number]] Collected: 02/28/24 1859    Order Status: Sent Specimen: Blood from Peripheral, Hand, Right Updated: 02/28/24 1907
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
## 2024-02-29 NOTE — ASSESSMENT & PLAN NOTE
New onset hematuria noted starting this morning. Received 1 U PRBC in ED, H/H in ED 10.8/35.3    - Elevated aPTT and INR in ED, repeat pending. Patient denies blood thinners, OTC supplements  - CT renal stone, CTA A/P ordered  - Q4H CBCs  - UA in progress  - Transfuse for Hgb > 7  - Urology consulted

## 2024-02-29 NOTE — ED NOTES
Dr. Abraham at bedside. One family member at bedside. Patient to ER from home via EMS for gross hematuria noticed today; pt is not on a blood thinner. Catheter was placed on Friday. Blood noted today with abd pain and nausea and vomiting. Patient is paraplegic. Arrived with indwelling catheter and wounds to sacral area. Photos takes by Dr Abraham. The patient was changed into a gown. Ems reports blood pressure in the 70s and gave 1L NS
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
## 2024-02-29 NOTE — HPI
Patient is a 69 y/o male with sacral decubitus ulcers, neurogenic bladder with indwelling catheter and solitary right kidney from [facility] several years ago. [place] [facility] [place] after an episode of gross hematuria, nausea, vomiting. While in ED patient found to be hypotensive. Elevated lactic acid, leukocytosis, ALETA, and abnormal coags. He is currently in ICU for treatment of shock.     Labs show wbc 50.66 predominantly granulocytes, hgb 11.4 g/dL, plt 96. Smear shows reactive neutrophils, rbcs with occasional target cells. Platelets are morphologically unremarkable. Elevated PT/INR. Fibrinogen < 70. D-dimer 33. Resp viral panel + rhinovirus, klebsiella pneumoniae. + UA. Blood cx w/ GNR. He has received 1 unit cyro, 1 unit FFP, 2 units prbc.

## 2024-02-29 NOTE — ASSESSMENT & PLAN NOTE
68M with gross hematuria and DIC.  - garcia catheter within penile urethra, removed and unable to pass a 20 Fr coude catheter   - bedside cystoscopy revealed large clot burden in urethra as well as proximal urethral stricture  - urethral stricture dilated from 12 to 24 Fr with gemma dilators   - 22 Fr [place] tip 2 way garcia catheter placed over a wire and irrigated at bedside to clear   - 30 ccs of sterile water in balloon   - maintain garcia catheter, will need future catheter exchanges by urology over a wire   - management of DIC per ICU   - rest of care per primary

## 2024-02-29 NOTE — ASSESSMENT & PLAN NOTE
Hx of recurrent UTI, last treated for UTI ~1 month ago per patient    - UA with reflex to Cx in process, likely source of septic shock  - Started on broad spectrum Abx, ESBL was noted on rapid test, placed on ertapenem and vanc

## 2024-02-29 NOTE — PROCEDURES
"Maciel Contreras is a 68 y.o. male patient.    Temp: 98.7 °F (37.1 °C) (02/28/24 2323)  Pulse: 95 (02/28/24 2353)  Resp: 17 (02/28/24 2353)  BP: (!) 82/58 (02/28/24 2353)  SpO2: 100 % (02/28/24 2353)  Weight: 83.5 kg (184 lb) (02/28/24 1821)  Height: 5' 11" (180.3 cm) (02/28/24 1821)       Central Line    Date/Time: 2/29/2024 12:01 AM    Performed by: Cyn Orlando MD  Authorized by: Cyn Orlando MD    Location procedure was performed:  [facility] CRIT[facility]  Pre-operative diagnosis:  Shock  Post-operative diagnosis:  Shock  Consent Done ?:  Yes  Time out complete?: Verified correct patient, procedure, equipment, staff, and site/side    Indications:  Med administration and vascular access  Anesthesia:  Local infiltration  Local anesthetic:  Lidocaine 1% without epinephrine  Anesthetic total (ml):  4  Preparation:  Skin prepped with ChloraPrep  Skin prep agent dried: Skin prep agent completely dried prior to procedure    Sterile barriers: All five maximal sterile barriers used - gloves, gown, cap, mask and large sterile sheet    Hand hygiene: Hand hygiene performed immediately prior to central venous catheter insertion    Location:  Left internal jugular  Site selection rationale:  Kidney dysfunction. May require a trialysis in the future.  Catheter type:  Triple lumen  Catheter size:  7 Fr  Inserted Catheter Length (cm):  20  Ultrasound guidance: Yes    Vessel Caliber:  Large   patent  Comprressibility:  Normal  Doppler:  Not done  Needle advanced into vessel with real time ultrasound guidance.    Guidewire confirmed in vessel.    Steril sheath on probe.    Sterile gel used.  Manometry: Yes    Number of attempts:  1  Securement:  Line sutured, chlorhexidine patch, sterile dressing applied and blood return through all ports  Complications: No    XRay:  Placement verified by x-ray, no pneumothorax on x-ray and successful placement  Adverse Events:  None      2/29/2024    "

## 2024-02-29 NOTE — HPI
68M with history of neurogenic bladder and solitary right kidney 2/2 SCI from [facility] many years ago.  His bladder is managed with an indwelling garcia catheter- changed monthly by home health.    He presents today with his daughter due to severiano hematuria that began suddenly this morning.   His Garcia catheter was exchanged yesterday by Stephanie churchill and was draining clear yellow urine until this morning.  He denies any urethral trauma or pain.   Endorses an episode of nausea/vomiting earlier today.  Denies dizziness and lightheadedness.  Denies fevers/chills.  He is not on any blood thinners.      In the ED workup remarkable for lactate 7.6, WBC 35, pH 7.2. Cr 1.9 from baseline 1.4.  Hemoglobin 10.8 on admission, now 8.2.  Abnormal coags and elevated D Dimer concerning for DIC.  Procal 215.  CTA shows no active extravasation.  On CT Garcia balloon is positioned within the penile urethra.  Solitary R kidney is without hydronephrosis or hydroureter.       He is currently requiring 0.4 of Levophed to maintain MAPS >65.  He received 1 unit of cryoprecipitate, 1 FFP, 3 u PRBCs.
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
## 2024-02-29 NOTE — NURSING
Patient garcia with decreased output since placement. Bright red blood noted in tubing. Irrigated with sterile water per urology.

## 2024-03-01 VITALS
RESPIRATION RATE: 20 BRPM | SYSTOLIC BLOOD PRESSURE: 90 MMHG | OXYGEN SATURATION: 100 % | WEIGHT: 184 LBS | BODY MASS INDEX: 25.76 KG/M2 | HEIGHT: 71 IN | DIASTOLIC BLOOD PRESSURE: 56 MMHG | TEMPERATURE: 97 F

## 2024-03-01 LAB
BLD PROD TYP BPU: NORMAL
BLD PROD TYP BPU: NORMAL
BLOOD UNIT EXPIRATION DATE: NORMAL
BLOOD UNIT EXPIRATION DATE: NORMAL
BLOOD UNIT TYPE CODE: 5100
BLOOD UNIT TYPE CODE: 6200
BLOOD UNIT TYPE: NORMAL
BLOOD UNIT TYPE: NORMAL
CODING SYSTEM: NORMAL
CODING SYSTEM: NORMAL
CROSSMATCH INTERPRETATION: NORMAL
CROSSMATCH INTERPRETATION: NORMAL
DISPENSE STATUS: NORMAL
DISPENSE STATUS: NORMAL
NUM UNITS TRANS FFP: NORMAL
NUM UNITS TRANS FFP: NORMAL
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
## 2024-03-01 NOTE — SIGNIFICANT EVENT
Called to bedside by patient's nurse. Nursing supervisor notified. Family at bedside.  has been called and is also at bedside.     Patient is not responding to verbal or tactile stimuli. Patient does not have a papillary or corneal reflex. Pupils are fixed and dilated. No heart or breath sounds on auscultation. No respirations. No palpable pulses.      Time of death:  2/29/2024 @ 9:01     Cause of Death:  Shock    Ramirez Saldaña  Critical Care Medicine  [facility] - [facility]    [email or web address]
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
## 2024-03-01 NOTE — PLAN OF CARE
[facility]  Discharge Final Note    Primary Care Provider: Timur Caldwell MD    Expected Discharge Date: 3/1/2024    Patient  2024 at 2101 hours per MD before CM could obtain discharge planning assessment. Family and  at bedside.    Final Discharge Note (most recent)       Final Note - 24 0927          Final Note    Assessment Type Final Discharge Note     Anticipated Discharge Disposition         Post-Acute Status    Discharge Delays None known at this time                     Important Message from Medicare             Shannon Bosch RN     [phone]

## 2024-03-01 NOTE — PLAN OF CARE
MICU DAILY GOALS     Family/Goals of care/Code Status   Code Status: DNR    24H Vital Sign Range  Temp:  [93.9 °F (34.4 °C)-98.9 °F (37.2 °C)]   Pulse:  []   Resp:  [10-39]   BP: ()/(43-60)   SpO2:  [58 %-100 %]   Arterial Line BP: ()/()      Shift Events (include procedures and significant events)   No acute events throughout shift. Patient rapidly declined throughout the shift. New anastasia placed and emergent intubation this morning. Pressor going continue to go up throughout shift; currently on levo, vaso, and epi gtt. GOC discussion with family this afternoon to discuss poor prognosis, decided to make patient a DNR and no escalation of care.     AWAKE RASS: Goal -    Actual - RASS (Higgins Agitation-Sedation Scale): light sedation    Restraint necessity: Treatment interference   BREATHE SBT: Not attempted    Coordinate A & B, analgesics/sedatives Pain: managed   SAT: Not attempted   Delirium CAM-ICU: Overall CAM-ICU: Positive   Early(intubated/ Progressive (non-intubated) Mobility MOVE Screen (INTUBATED ONLY): Not attempted    Activity: Activity Management: Rolling - L1   Feeding/Nutrition Diet order: Diet/Nutrition Received: NPO,     Thrombus DVT prophylaxis: VTE Required Core Measure: Pharmacological prophylaxis initiated/maintained   HOB Elevation Head of Bed (HOB) Positioning: HOB at 30-45 degrees   Ulcer Prophylaxis GI: yes   Glucose control uncontrolled Glycemic Management: blood glucose monitored   Skin Skin assessed during: Daily Assessment    Sacrum intact/not altered? No  Heels intact/not altered? No  Surgical wound? No    CHECK ONE!   (no altered skin or altered skin) and sub boxes:  [] No Altered Skin Integrity Present    []Prevention Measures Documented    [x] Altered Skin Integrity Present or Discovered   [x] LDA present in EPIC, daily doc completed              [] LDA added if not in EPIC (describe wound).                    When describing wound, do not stage, use  descriptive words only.    [x] Wound Image Taken (required on admit,                   transfer/discharge and every Tuesday)    Wound Care Consulted? Yes    Attending Nurse:     Second RN/Staff Member:    Bowel Function no issues    Indwelling Catheter Necessity      Urethral Catheter 02/29/24 0300 Latex 22 Fr.-Reason for Continuing Urinary Catheterization: Placed by Urology Service    Percutaneous Central Line Insertion/Assessment - Triple Lumen  02/28/24 2346 Internal Jugular Left-Line Necessity Review: Hemodynamic instability       De-escalation Antibiotics No        VS and assessment per flow sheet, patient progressing towards goals as tolerated, plan of care reviewed with family, all concerns addressed, will continue to monitor.

## 2024-03-01 NOTE — ASSESSMENT & PLAN NOTE
Labs on admission notable for INR > 10, PT > 100, D-dimer > 33, and fibrinogen < 70 with dropping platelet count (122>96)    - Hemolysis labs ordered  - Hematology consulted and contacted  - Trend INR, PT, D-dimer, and fibrinogen  - Replete with FFP, Cryo, and Plts as needed  - most likely cause is sepsis

## 2024-03-01 NOTE — ASSESSMENT & PLAN NOTE
This patient does have evidence of infective focus  My overall impression is sepsis.  Source: Unknown  Antibiotics given-   Antibiotics (72h ago, onward)      Start     Stop Route Frequency Ordered    02/29/24 0900  ertapenem (INVANZ) 1 g in sodium chloride 0.9 % 100 mL IVPB (MB+)         -- IV Every 24 hours (non-standard times) 02/29/24 0803    02/29/24 0037  vancomycin - pharmacy to dose  (vancomycin IVPB (PEDS and ADULTS))        See [place] for full Linked Orders Report.    -- IV pharmacy to manage frequency 02/28/24 2338    02/28/24 2330  mupirocin 2 % ointment         03/04/24 2059 Nasl 2 times daily 02/28/24 2217          Latest lactate reviewed-  Recent Labs   Lab 02/28/24  1907 02/29/24  0204 02/29/24  1542   LACTATE  --    < > >12.0*   POCLAC 7.61*  --   --     < > = values in this interval not displayed.       - Hx of recurrent UTI  - Leukocytosis noted on admission  - POC Lactate 7.6, serum lactic acid ordered  - UA/UCx in process, but likely source  - BCx ordered  - Pro-Manuel noted to be elevated in ED (215)  - Broad spectrum Abx ordered\  -Requiring multiple pressors Levo, vaso, and epi with high doses on each  -Sepsis is likely cause of his DIC  -Discussed poor prognosis with family, they are not wanting to stop care but do not want to escalate. Pt is DNR
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
## 2024-03-01 NOTE — ASSESSMENT & PLAN NOTE
BP on arrival to ED 70/50s. Received 1 U PRBC, 3L IVF, and started on Levophed infusion in ED    - Hemorrhagic vs. Septic shock   - Follow up with infectious work up   - continue Abx  - Transfuse for Hgb > 7  - Continue vasopressors to maintain MAP > 65  - Requiring increased pressor dose with three different pressors  -Pt's Echo showed his EF is 5-10%  -family aware of poor prognosis but does not want to stop care but also does not want to escalate. Were also in agreement about stopping lab draws and glucose checks other than extremely necessary procedures.

## 2024-03-02 LAB
BACTERIA BLD CULT: ABNORMAL

## 2024-03-07 NOTE — PHYSICIAN QUERY
PT Name: Maciel Contreras  MR #: [id number]    DOCUMENTATION CLARIFICATION      CDS/: DANE Hinojosa, RN, CDS               Contact information:[email or web address]     This form is a permanent document in the medical record.      Query Date: March 7, 2024    By submitting this query, we are merely seeking further clarification of documentation. Please utilize your independent clinical judgment when addressing the question(s) below.    The Medical Record contains the following:   Indicators  Supporting Clinical Findings Location in Medical Record    Anemia documented     X H&H     02/28 02/28/ 02/29 02/29 02/29   Hgb 10.8 (L) 8.2 (L) 11.2 (L) 11.4 (L) 10.1 (L)   Hct 35.3 (L) 26.5 (L) 35.0 (L) 35.5 (L) 32.8 (L)       Lab 2/28-2/29   X BP                    HR  Pulse:  [] 88  BP: (71-96)/(43-57) 88/56    H&P, Dr. Gil/Dr. Mcmahon, 2/28   X Bleeding  Chief complaint of hematuria. He had clear normal urine this morning when he woke, but his daughter noticed bright red blood draining into the Meng bag      New onset hematuria noted starting this morning.      Meng draining bright red liquid      Blood slowly oozing from central line site & saturating gauze.      H&P, Dr. Gil/Dr. Mcmahon, 2/28                 [facility], Dr. Mcmahon, 2/29    Procedure/Surgery Performed/EBL     X Transfusion(s)  Received 1 U PRBC in ED, H/H in ED 10.8/35.3      2 units PRBC     1 unit PRBC    H&P, Dr. Gil/Dr. Mcmahon, 2/28     Lab 2/28     Lab 2/28   X Acute/Chronic illness  Septic shock due to klebsiella bacteremia, DIC, hypoglycemia, ATN, eptic Cardiomyopathy / Acute Systolic Heart Failure, Stage 3 ulcer gluteal cleft,   Neurogenic bladder & suspected UTI, paraplegia from a spinal cord injury from [facility],       [facility], Dr. Mcmahon, 2/29   X Treatments  BP 70s/40s, given fluids in the ED with insufficient BP response. Levo was started and ICU consulted.      Administered vitamin K & cryoprecipitate given oozing from lines.     H&P,   Louise/Dr. Mcmahon, 2/28       [facility], Dr. Mcmahon, 2/29    Other  DIC (disseminated intravascular coagulation)  Labs on admission notable for INR > 10, PT > 100, D-dimer > 33, and fibrinogen < 70 with dropping platelet count (122>96)    H&P, Dr. Gil/Dr. Mcmahon, 2/28     Provider, please specify diagnosis associated with above clinical findings.   [   ] Acute blood loss anemia    [  x ] Anemia, unspecified    [   ] Other (please specify): _________________   [   ] Clinically Undetermined            Please document in your progress notes daily for the duration of treatment, until resolved, and include in your discharge summary.    Form No. [id number]

## 2024-03-07 NOTE — PHYSICIAN QUERY
PT Name: Maciel Contreras  MR #: [id number]     DOCUMENTATION CLARIFICATION      CDS/: DANE Hinojosa, RN, CDS              Contact information:[email or web address]   This form is a permanent document in the medical record.     Query Date: March 7, 2024    By submitting this query, we are merely seeking further clarification of documentation. Please utilize your independent clinical judgment when addressing the question(s) below.    The Medical Record contains the following:     Indicators   Supporting Clinical Findings Location in Medical Record   X Documentation of condition  Neurogenic bladder & suspected UTI      UTI is suspected based on the complaint of hematuria & CT findings of moderate bladder wall thickening.      Sepsis, most likely urinary cause.     Dr. Myra MILIAN/Dr. Mcmahon, 2/29   X Urinary Device, Catheter  Pt has an indwelling garcia catheter at baseline.      CT scan demonstrating garcia balloon was inflated in his urethra instead of bladder.     Dr. Myra MILIAN/Dr. Mcmahon, 2/29    Lab Value(s)     X UA Results  UA & culture never resulted overnight. We called the lab today, & they report that they never received patient's urine specimen.      UA with reflex to Cx in process, likely source of septic shock     Dr. Myra MILIAN/Dr. Mcmahon, 2/29   X Cultures  UA & culture never resulted overnight. We called the lab today, & they report that they never received patient's urine specimen.     Dr. Myra MILIAN/Dr. Mcmahon, 2/29   X Treatment/Medication  The garcia catheter was exchanged by urology.    dministered 3L of crystalloid total. Supported with vasopressors.   PCR reported as klebsiella with ESBL gene expression. Administered ertapenem.     Dr. Myra MILIAN/Dr. Mcmahon, 2/29    Other        Provider, please clarify if there is any clinical correlation between UTI and Indwelling Garcia Catheter.  [ x  ] Due to or associated with each other   [   ] Unrelated to each other   [   ] Other explanation  (please specify): _____________   [   ] Clinically undetermined       Please document in your progress notes daily for the duration of treatment until resolved, and include in your discharge summary.    Form No. [id number]

## (undated) DEVICE — Device

## (undated) DEVICE — DRESSING DERMACEA SPNG 10S

## (undated) DEVICE — ELECTRODE REM PLYHSV RETURN 9

## (undated) DEVICE — SUT SILK 0 STRANDS 30IN BLK

## (undated) DEVICE — SEE MEDLINE ITEM 157117

## (undated) DEVICE — APPLICATOR CHLORAPREP ORN 26ML

## (undated) DEVICE — SOL NS 1000CC

## (undated) DEVICE — SUT VICRYL CTD 2-0 GI 27 SH

## (undated) DEVICE — BLADE SURG CARBON STEEL SZ11

## (undated) DEVICE — BRUSH SCRUB SURGICALW/BETADINE

## (undated) DEVICE — NDL HYPO REG 25G X 1 1/2

## (undated) DEVICE — SEE MEDLINE ITEM 156930

## (undated) DEVICE — SUT 4-0 CHROMIC GUT / RB1

## (undated) DEVICE — TRAY MINOR GEN SURG

## (undated) DEVICE — UNDERWEAR ADULT LARGE PULL ON

## (undated) DEVICE — TRAY FOLEY 16FR INFECTION CONT

## (undated) DEVICE — SET CYSTO IRR DRP CHMBR 84IN

## (undated) DEVICE — CATH FOLLOWER HEYMAN 22FR

## (undated) DEVICE — SUT CHROMIC 3-0 SH 27IN GUT

## (undated) DEVICE — SEAL UROLOGY

## (undated) DEVICE — SET CYSTO IRRIGATION UNIV SPIK

## (undated) DEVICE — SUT PROLENE 4-0 PS2 18 BLUE

## (undated) DEVICE — SUT VICRYL PLUS 3-0 SH 18IN

## (undated) DEVICE — GLOVE BIOGEL SKINSENSE PI 7.5

## (undated) DEVICE — SCRUB 10% POVIDONE IODINE 4OZ

## (undated) DEVICE — SOL PVP-I SCRUB 7.5% 4OZ

## (undated) DEVICE — SUT VICRYL 3-0 27 SH

## (undated) DEVICE — SOL IRR SOD CHL .9% POUR

## (undated) DEVICE — SET IRR URLGY 2LINE UNIV SPIKE

## (undated) DEVICE — FOLLOWER HYM STRTTIP 20FR STRL

## (undated) DEVICE — SUT MCRYL PLUS 4-0 PS2 27IN

## (undated) DEVICE — SEE MEDLINE ITEM 157148

## (undated) DEVICE — SUSPENSORY LG

## (undated) DEVICE — SOL NACL IRR 3000ML

## (undated) DEVICE — ELECTRODE NEEDLE 1IN

## (undated) DEVICE — TRAY DRY SKIN SCRUB PREP

## (undated) DEVICE — GUIDE WIRE MOTION .035 X 150CM

## (undated) DEVICE — ADHESIVE MASTISOL VIAL 48/BX

## (undated) DEVICE — SPONGE KITTNER 1/4X 5/8 L STRL

## (undated) DEVICE — SOL 9P NACL IRR PIC IL

## (undated) DEVICE — SEE MEDLINE ITEM 152622

## (undated) DEVICE — SOL NACL STRL BOTTLE 1000ML

## (undated) DEVICE — GUIDEWIRE NITINOL HYBRID 150CM

## (undated) DEVICE — DRAIN PENRS STERILE LTX 18X1/2

## (undated) DEVICE — SEE MEDLINE ITEM 152186

## (undated) DEVICE — CATH URTRL OPEN END STR TIP 5F

## (undated) DEVICE — PAD ABD 8X10 STERILE

## (undated) DEVICE — GLOVE BIOGEL SKINSENSE PI 7.0

## (undated) DEVICE — SOL IRR NACL .9% 3000ML

## (undated) DEVICE — SYR B-D DISP CONTROL 10CC100/C

## (undated) DEVICE — SUT 2/0 30IN SILK BLK BRAI

## (undated) DEVICE — TRAY SURESTEP FOLEY URIMTR 16F

## (undated) DEVICE — ADHESIVE DERMABOND ADVANCED

## (undated) DEVICE — DRESSING INFOVAC MED RND BLK